# Patient Record
Sex: FEMALE | Race: WHITE | NOT HISPANIC OR LATINO | Employment: OTHER | URBAN - METROPOLITAN AREA
[De-identification: names, ages, dates, MRNs, and addresses within clinical notes are randomized per-mention and may not be internally consistent; named-entity substitution may affect disease eponyms.]

---

## 2019-10-21 ENCOUNTER — HOSPITAL ENCOUNTER (EMERGENCY)
Facility: CLINIC | Age: 74
Discharge: HOME | End: 2019-10-21
Attending: EMERGENCY MEDICINE | Admitting: EMERGENCY MEDICINE
Payer: MEDICARE

## 2019-10-21 ENCOUNTER — APPOINTMENT (OUTPATIENT)
Dept: ULTRASOUND IMAGING | Facility: CLINIC | Age: 74
End: 2019-10-21
Attending: EMERGENCY MEDICINE
Payer: MEDICARE

## 2019-10-21 VITALS
DIASTOLIC BLOOD PRESSURE: 69 MMHG | OXYGEN SATURATION: 99 % | WEIGHT: 206 LBS | HEART RATE: 64 BPM | SYSTOLIC BLOOD PRESSURE: 135 MMHG | TEMPERATURE: 97.7 F | RESPIRATION RATE: 16 BRPM

## 2019-10-21 DIAGNOSIS — I10 HYPERTENSION, UNSPECIFIED TYPE: ICD-10-CM

## 2019-10-21 DIAGNOSIS — K59.00 CONSTIPATION, UNSPECIFIED CONSTIPATION TYPE: ICD-10-CM

## 2019-10-21 DIAGNOSIS — D25.9 UTERINE LEIOMYOMA, UNSPECIFIED LOCATION: ICD-10-CM

## 2019-10-21 LAB
ALBUMIN SERPL-MCNC: 3.9 G/DL (ref 3.4–5)
ALBUMIN UR-MCNC: NEGATIVE MG/DL
ALP SERPL-CCNC: 81 U/L (ref 40–150)
ALT SERPL W P-5'-P-CCNC: 17 U/L (ref 0–50)
ANION GAP SERPL CALCULATED.3IONS-SCNC: 7 MMOL/L (ref 3–14)
APPEARANCE UR: CLEAR
AST SERPL W P-5'-P-CCNC: 18 U/L (ref 0–45)
BASOPHILS # BLD AUTO: 0 10E9/L (ref 0–0.2)
BASOPHILS NFR BLD AUTO: 0.2 %
BILIRUB SERPL-MCNC: 0.5 MG/DL (ref 0.2–1.3)
BILIRUB UR QL STRIP: NEGATIVE
BUN SERPL-MCNC: 14 MG/DL (ref 7–30)
CALCIUM SERPL-MCNC: 8.8 MG/DL (ref 8.5–10.1)
CHLORIDE SERPL-SCNC: 108 MMOL/L (ref 94–109)
CO2 SERPL-SCNC: 25 MMOL/L (ref 20–32)
COLOR UR AUTO: ABNORMAL
CREAT SERPL-MCNC: 0.75 MG/DL (ref 0.52–1.04)
DIFFERENTIAL METHOD BLD: NORMAL
EOSINOPHIL # BLD AUTO: 0.1 10E9/L (ref 0–0.7)
EOSINOPHIL NFR BLD AUTO: 0.9 %
ERYTHROCYTE [DISTWIDTH] IN BLOOD BY AUTOMATED COUNT: 12.3 % (ref 10–15)
GFR SERPL CREATININE-BSD FRML MDRD: 80 ML/MIN/{1.73_M2}
GLUCOSE SERPL-MCNC: 152 MG/DL (ref 70–99)
GLUCOSE UR STRIP-MCNC: NEGATIVE MG/DL
HCT VFR BLD AUTO: 46.2 % (ref 35–47)
HGB BLD-MCNC: 15.4 G/DL (ref 11.7–15.7)
HGB UR QL STRIP: ABNORMAL
IMM GRANULOCYTES # BLD: 0.1 10E9/L (ref 0–0.4)
IMM GRANULOCYTES NFR BLD: 0.5 %
KETONES UR STRIP-MCNC: NEGATIVE MG/DL
LEUKOCYTE ESTERASE UR QL STRIP: NEGATIVE
LIPASE SERPL-CCNC: 203 U/L (ref 73–393)
LYMPHOCYTES # BLD AUTO: 2.2 10E9/L (ref 0.8–5.3)
LYMPHOCYTES NFR BLD AUTO: 22.4 %
MCH RBC QN AUTO: 31.4 PG (ref 26.5–33)
MCHC RBC AUTO-ENTMCNC: 33.3 G/DL (ref 31.5–36.5)
MCV RBC AUTO: 94 FL (ref 78–100)
MONOCYTES # BLD AUTO: 0.6 10E9/L (ref 0–1.3)
MONOCYTES NFR BLD AUTO: 5.9 %
MUCOUS THREADS #/AREA URNS LPF: PRESENT /LPF
NEUTROPHILS # BLD AUTO: 6.9 10E9/L (ref 1.6–8.3)
NEUTROPHILS NFR BLD AUTO: 70.1 %
NITRATE UR QL: NEGATIVE
NRBC # BLD AUTO: 0 10*3/UL
NRBC BLD AUTO-RTO: 0 /100
PH UR STRIP: 8 PH (ref 5–7)
PLATELET # BLD AUTO: 165 10E9/L (ref 150–450)
POTASSIUM SERPL-SCNC: 3.2 MMOL/L (ref 3.4–5.3)
PROT SERPL-MCNC: 7.3 G/DL (ref 6.8–8.8)
RBC # BLD AUTO: 4.9 10E12/L (ref 3.8–5.2)
RBC #/AREA URNS AUTO: 13 /HPF (ref 0–2)
SODIUM SERPL-SCNC: 140 MMOL/L (ref 133–144)
SOURCE: ABNORMAL
SP GR UR STRIP: 1.02 (ref 1–1.03)
SQUAMOUS #/AREA URNS AUTO: 1 /HPF (ref 0–1)
UROBILINOGEN UR STRIP-MCNC: NORMAL MG/DL (ref 0–2)
WBC # BLD AUTO: 9.9 10E9/L (ref 4–11)
WBC #/AREA URNS AUTO: 1 /HPF (ref 0–5)

## 2019-10-21 PROCEDURE — 99284 EMERGENCY DEPT VISIT MOD MDM: CPT | Mod: 25

## 2019-10-21 PROCEDURE — 83690 ASSAY OF LIPASE: CPT | Performed by: EMERGENCY MEDICINE

## 2019-10-21 PROCEDURE — 76830 TRANSVAGINAL US NON-OB: CPT

## 2019-10-21 PROCEDURE — 80053 COMPREHEN METABOLIC PANEL: CPT | Performed by: EMERGENCY MEDICINE

## 2019-10-21 PROCEDURE — 81001 URINALYSIS AUTO W/SCOPE: CPT | Performed by: EMERGENCY MEDICINE

## 2019-10-21 PROCEDURE — 85025 COMPLETE CBC W/AUTO DIFF WBC: CPT | Performed by: EMERGENCY MEDICINE

## 2019-10-21 RX ORDER — HYDROCODONE BITARTRATE AND ACETAMINOPHEN 5; 325 MG/1; MG/1
1 TABLET ORAL EVERY 6 HOURS PRN
Qty: 10 TABLET | Refills: 0 | Status: SHIPPED | OUTPATIENT
Start: 2019-10-21 | End: 2019-10-24

## 2019-10-21 ASSESSMENT — ENCOUNTER SYMPTOMS
ABDOMINAL PAIN: 1
FEVER: 0
DYSURIA: 1

## 2019-10-21 NOTE — ED AVS SNAPSHOT
[facility] Emergency Department  [address]  [facility] [address]  Phone:  [phone]  Fax:  [phone]                                    Suma Iverson   MRN: [id number]    Department:  [facility] Emergency Department   Date of Visit:  10/21/2019           After Visit Summary Signature Page    I have received my discharge instructions, and my questions have been answered. I have discussed any challenges I see with this plan with the nurse or doctor.    ..........................................................................................................................................  Patient/Patient Representative Signature      ..........................................................................................................................................  Patient Representative Print Name and Relationship to Patient    ..................................................               ................................................  Date                                   Time    ..........................................................................................................................................  Reviewed by Signature/Title    ...................................................              ..............................................  Date                                               Time          22EPIC Rev 08/18

## 2019-10-22 NOTE — ED PROVIDER NOTES
History     Chief Complaint:  Abdominal Pain    HPI  Suma Iverson is a 74 year old female with a history of fibroid removal who presents to the emergency department today for evaluation of achy lower abdominal/pelvic pain. She has been experiencing this for a week now and reports the pain is intermittent lasting for an hour at a time. The patient was already evaluated by her primary care doctor and urgent care in the last 2 days with a reportedly negative abdominal CT, negative UA, and negative CBC. Her pain was better for a while this afternoon but then returned this evening and she is especially concerned with her upcoming trip to [place]. She also has been having urinary incontinence issues for several weeks and dysuria but has not seen a urologist or gynecologist yet. She denies any vaginal bleeding or fever. The patient has no abdominal surgical history other then her fibroid removal.     Allergies:  No known drug allergies    Medications:    The patient is not currently taking any prescribed medications.    Past Medical History:    BRANDON  HTN  HLD  Seizure disorder  Type 2 diabetes    Past Surgical History:    History reviewed. No pertinent surgical history.    Family History:    History reviewed. No pertinent family history.     Social History:  The patient arrives alone  Spends half the year in [place]  Marital Status:  [2]      Review of Systems   Constitutional: Negative for fever.   Gastrointestinal: Positive for abdominal pain.   Genitourinary: Positive for dysuria, enuresis and pelvic pain. Negative for vaginal bleeding and vaginal discharge.   All other systems reviewed and are negative.    Physical Exam     Patient Vitals for the past 24 hrs:   BP Temp Temp src Pulse Heart Rate Resp SpO2 Weight   10/21/19 2339 -- -- -- -- -- 16 -- --   10/21/19 2334 -- -- -- -- -- -- 99 % --   10/21/19 2332 135/69 -- -- 64 -- -- -- --   10/21/19 2200 (!) 197/133 -- -- 86 -- -- 100 % --   10/21/19 2154 (!)  201/111 97.7  F (36.5  C) Oral -- 75 18 100 % 93.4 kg (206 lb)     Physical Exam  General: Patient is alert and interactive when I enter the room  Head:  The scalp, face, and head appear normal  Eyes:  Conjunctivae are normal  ENT:    The nose is normal    Pinnae are normal    External acoustic canals are normal  Neck:  Trachea midline  CV:  Pulses are normal   Resp:  No respiratory distress   Abdomen:      Soft, non-tender, non-distended  Musc:  Normal muscular tone    No major joint effusions    No asymmetric leg swelling  Skin:  No rash or lesions noted  Neuro:  Speech is normal and fluent. Face is symmetric.     Moving all extremities well.   Psych: Awake. Alert.  Normal affect.  Appropriate interactions.    Emergency Department Course     Imaging:  Radiology findings were communicated with the patient who voiced understanding of the findings.  US Pelvis compete w transvaginal   IMPRESSION:  1. Multiple uterine fibroids.  2. The endometrium is normal in thickness at 0.2 cm. The endometrium is obscured near the uterine fundus and submucosal fibroid is a possibility  Reading per radiology    Laboratory:  Laboratory findings were communicated with the patient who voiced understanding of the findings.    CBC:  o/w WNL. (WBC 9.9, HGB 15.4, )   CMP: Glucose 152 (H), potassium 3.2 (L), o/w WNL (Creatinine: 0.75)    Lipase: 203    UA: urine blood trace, pH 8.0 (H), RBC/HPF 13 (H), mucus present, o/w Negative    Emergency Department Course:  Past medical records, nursing notes, and vitals reviewed.  2152: I performed an exam of the patient and obtained history, as documented above.     IV was inserted and blood was drawn for laboratory testing, results above.  The patient provided a urine sample here in the emergency department. This was sent for laboratory testing, findings above.  The patient was sent for a US abdomen w transvag while in the emergency department, findings above.    I personally reviewed the  laboratory/imaging results with the Patient and answered all related questions prior to discharge.    2328: I rechecked the patient.  Findings and plan explained to the Patient. Patient discharged home with instructions regarding supportive care, medications, and reasons to return. The importance of close follow-up was reviewed.     Impression & Plan      Medical Decision Making:  Suma Iverson is a 74 year old female who presents to the emergency department today for evaluation of pelvic pain.  Patient describes intermittent pain for the past week.  She is already had a negative work-up including a CT and urine.  Her abdomen is very benign.  We did do an ultrasound which does reveal multiple uterine fibroids.  It is possible this is causing her pain.  Her urine shows no signs of infection.  I suspect some of this could be causing her pain.  She also had noticeable constipation on CT.  I think at this point she should follow-up with a gynecologist to discuss her fibroids and encourage continued improvement of her constipation with laxatives.  She is going to start MiraLAX.  I will give her a short course of pain medication to help with her pain as she is going to [place] soon.  She will follow-up with these doctors in [place].  Patient also was quite hypertensive and we discussed following up with her doctor about this.  Recently on discharge her blood pressure normalized.      Diagnosis:    ICD-10-CM   1. Uterine leiomyoma, unspecified location D25.9   2. Constipation, unspecified constipation type K59.00   3. Hypertension, unspecified type I10       Disposition:   discharged to home    Discharge Medications:     Medication List      Started    HYDROcodone-acetaminophen 5-325 MG tablet  Commonly known as:  NORCO  1 tablet, Oral, EVERY 6 HOURS PRN            Scribe Disclosure:  Kandy ACEVEDO, am serving as a scribe at 9:52 PM on 10/21/2019 to document services personally performed by Yaneth Rolle MD based on  my observations and the provider's statements to me.    [facility] EMERGENCY DEPARTMENT       Yaneth Rolle MD  10/22/19 0209

## 2019-10-22 NOTE — ED TRIAGE NOTES
"Pt reports lower pelvic pain that feels like \"there's something there\" x1 week. Had CT done at [facility] which noted fibroids. Pt denies urinary symptoms.  ABCs intact A&Ox4.  "

## 2021-09-06 ENCOUNTER — HOSPITAL ENCOUNTER (EMERGENCY)
Facility: CLINIC | Age: 76
Discharge: HOME | End: 2021-09-06
Attending: NURSE PRACTITIONER | Admitting: NURSE PRACTITIONER
Payer: MEDICARE

## 2021-09-06 VITALS
RESPIRATION RATE: 18 BRPM | TEMPERATURE: 97.4 F | SYSTOLIC BLOOD PRESSURE: 152 MMHG | HEART RATE: 89 BPM | DIASTOLIC BLOOD PRESSURE: 88 MMHG | OXYGEN SATURATION: 98 %

## 2021-09-06 DIAGNOSIS — M79.646 THUMB PAIN: ICD-10-CM

## 2021-09-06 DIAGNOSIS — T63.441A BEE STING REACTION: ICD-10-CM

## 2021-09-06 PROBLEM — F41.1 GAD (GENERALIZED ANXIETY DISORDER): Status: ACTIVE | Noted: 2021-07-07

## 2021-09-06 PROCEDURE — 99282 EMERGENCY DEPT VISIT SF MDM: CPT

## 2021-09-06 ASSESSMENT — ENCOUNTER SYMPTOMS
WEAKNESS: 0
WOUND: 0
NUMBNESS: 0

## 2021-09-06 NOTE — DISCHARGE INSTRUCTIONS
Take Benadryl also known as diphenhydramine.  25 to 50 mg 3 times a day for the next 2 to 3 days.  Watch for signs of infection as we discussed.

## 2021-09-06 NOTE — ED TRIAGE NOTES
Patient presents to the ED with a swollen left thumb. Was stung by a bee on Saturday. States swelling is not getting any better. Denies difficulty breathing or swelling of mouth and throat.

## 2021-09-06 NOTE — ED PROVIDER NOTES
History   Chief Complaint:  Insect Bite       HPI   Suma Iverson is a 75 year old female with history of anxiety, type 2 diabetes and hypertension who presents with increased swelling to her left thumb after a bee sting 2 days ago. She notes that she used Hydrocortisone cream that only made her hand and wrist itch. She states that water hurts her thumb and ice and baking soda provided no relief. She is not on any blood thinners.     Review of Systems   Musculoskeletal:        Mild thumb edema   Skin: Negative for wound.        +bee sting   Neurological: Negative for weakness and numbness.   All other systems reviewed and are negative.    Allergies:  The patient has no known allergies.     Medications:  Oretic  Prilosec  Zoloft  Zocor    Past Medical History:    PUD  Anxiety  Type 2 diabetes  Hypertension  Hyperlipidemia  Seizure disorder  Uterine fibroid    Past Surgical History:    Hysteroscopy  Cataract removal with implant     Family History:    Father: CAD, type 2 diabetes    Social History:  Patient presents to the ED alone.    Physical Exam     Patient Vitals for the past 24 hrs:   BP Temp Pulse Resp SpO2   09/06/21 1419 (!) 152/88 97.4  F (36.3  C) 89 18 98 %       Physical Exam  General: Alert, No obvious discomfort, well kept   HENT:  Normal voice, No lymphadenopathy  Eyes:  The pupils are equal, round, and reactive to light, Conjunctiva normal, No scleral icterus   Neck:  Normal range of motion  CV:  Normal Pulses  Resp:  Non-labored, No cough  MS:  Normal muscular tone, moves all extremities, left thumb with mild edema.  No erythema.  No proximal streaking.  No wound.  Skin:  No rash or acute skin lesions noted  Neuro:  Speech is normal and fluent  Psych: Awake. Alert.  Normal affect.  Appropriate interactions. Good eye contact    Emergency Department Course     Emergency Department Course:    Reviewed:  I reviewed nursing notes, vitals, past medical history and care  everywhere    Assessments:  1625 I obtained history and examined the patient as noted above.     Disposition:  The patient was discharged to home.     Impression & Plan     Medical Decision Making:  Suma Iverson is a 75 year old female who presents today for evaluation of left thumb pain after an insect sting.  She feels that she may have been stung by one or more of these 2 days ago.  She continued to have some some swelling and discomfort therefore presented for evaluation.  Her examination showed some mild swelling consistent with mild histamine reaction.  There is no erythema or proximal streaking.  No signs of infection.  She had not tried a antihistamine for this.  We discussed use of Benadryl.  There is no indication for antibiotics.  She is advised on signs and symptoms of infection.  Will follow up with primary care provider in 3 to 5 days if further concerns sooner if worsening symptoms.  At this point time she appears to be safe and appropriate for outpatient management follow-up and is discharged home.        Diagnosis:    ICD-10-CM    1. Bee sting reaction  T63.441A    2. Thumb pain  M79.646        Scribe Disclosure:  I, Heriberto Baldwin, am serving as a scribe at 4:29 PM on 9/6/2021 to document services personally performed by Markus Zambrano APRN based on my observations and the provider's statements to me.            Markus Zambrano APRN CNP  09/06/21 4406

## 2022-10-19 ENCOUNTER — HOSPITAL ENCOUNTER (EMERGENCY)
Facility: CLINIC | Age: 77
Discharge: HOME | End: 2022-10-19
Attending: EMERGENCY MEDICINE | Admitting: EMERGENCY MEDICINE
Payer: MEDICARE

## 2022-10-19 ENCOUNTER — APPOINTMENT (OUTPATIENT)
Dept: GENERAL RADIOLOGY | Facility: CLINIC | Age: 77
End: 2022-10-19
Attending: EMERGENCY MEDICINE
Payer: MEDICARE

## 2022-10-19 VITALS
RESPIRATION RATE: 18 BRPM | OXYGEN SATURATION: 97 % | DIASTOLIC BLOOD PRESSURE: 87 MMHG | TEMPERATURE: 97 F | SYSTOLIC BLOOD PRESSURE: 159 MMHG | HEART RATE: 97 BPM

## 2022-10-19 DIAGNOSIS — Z87.19 HISTORY OF GASTROINTESTINAL ULCER: ICD-10-CM

## 2022-10-19 DIAGNOSIS — L74.9 SWEATING ABNORMALITY: ICD-10-CM

## 2022-10-19 LAB
ANION GAP SERPL CALCULATED.3IONS-SCNC: 14 MMOL/L (ref 7–15)
BASOPHILS # BLD AUTO: 0 10E3/UL (ref 0–0.2)
BASOPHILS NFR BLD AUTO: 0 %
BUN SERPL-MCNC: 28.5 MG/DL (ref 8–23)
CALCIUM SERPL-MCNC: 9.9 MG/DL (ref 8.8–10.2)
CHLORIDE SERPL-SCNC: 99 MMOL/L (ref 98–107)
CREAT SERPL-MCNC: 0.91 MG/DL (ref 0.51–0.95)
D DIMER PPP FEU-MCNC: 0.58 UG/ML FEU (ref 0–0.5)
DEPRECATED HCO3 PLAS-SCNC: 26 MMOL/L (ref 22–29)
EOSINOPHIL # BLD AUTO: 0 10E3/UL (ref 0–0.7)
EOSINOPHIL NFR BLD AUTO: 0 %
ERYTHROCYTE [DISTWIDTH] IN BLOOD BY AUTOMATED COUNT: 11.9 % (ref 10–15)
FLUAV RNA SPEC QL NAA+PROBE: NEGATIVE
FLUBV RNA RESP QL NAA+PROBE: NEGATIVE
GFR SERPL CREATININE-BSD FRML MDRD: 65 ML/MIN/1.73M2
GLUCOSE SERPL-MCNC: 147 MG/DL (ref 70–99)
HCT VFR BLD AUTO: 49.2 % (ref 35–47)
HGB BLD-MCNC: 16.2 G/DL (ref 11.7–15.7)
HOLD SPECIMEN: NORMAL
IMM GRANULOCYTES # BLD: 0 10E3/UL
IMM GRANULOCYTES NFR BLD: 0 %
LYMPHOCYTES # BLD AUTO: 1.9 10E3/UL (ref 0.8–5.3)
LYMPHOCYTES NFR BLD AUTO: 18 %
MCH RBC QN AUTO: 31.8 PG (ref 26.5–33)
MCHC RBC AUTO-ENTMCNC: 32.9 G/DL (ref 31.5–36.5)
MCV RBC AUTO: 97 FL (ref 78–100)
MONOCYTES # BLD AUTO: 0.5 10E3/UL (ref 0–1.3)
MONOCYTES NFR BLD AUTO: 4 %
NEUTROPHILS # BLD AUTO: 8.3 10E3/UL (ref 1.6–8.3)
NEUTROPHILS NFR BLD AUTO: 78 %
NRBC # BLD AUTO: 0 10E3/UL
NRBC BLD AUTO-RTO: 0 /100
PLATELET # BLD AUTO: 201 10E3/UL (ref 150–450)
POTASSIUM SERPL-SCNC: 3.5 MMOL/L (ref 3.4–5.3)
RBC # BLD AUTO: 5.09 10E6/UL (ref 3.8–5.2)
RSV RNA SPEC NAA+PROBE: NEGATIVE
SARS-COV-2 RNA RESP QL NAA+PROBE: NEGATIVE
SODIUM SERPL-SCNC: 139 MMOL/L (ref 136–145)
TROPONIN T SERPL HS-MCNC: 12 NG/L
TROPONIN T SERPL HS-MCNC: 13 NG/L
TSH SERPL DL<=0.005 MIU/L-ACNC: 1.34 UIU/ML (ref 0.3–4.2)
WBC # BLD AUTO: 10.8 10E3/UL (ref 4–11)

## 2022-10-19 PROCEDURE — 84484 ASSAY OF TROPONIN QUANT: CPT | Mod: 91 | Performed by: EMERGENCY MEDICINE

## 2022-10-19 PROCEDURE — C9803 HOPD COVID-19 SPEC COLLECT: HCPCS

## 2022-10-19 PROCEDURE — 71046 X-RAY EXAM CHEST 2 VIEWS: CPT

## 2022-10-19 PROCEDURE — 85025 COMPLETE CBC W/AUTO DIFF WBC: CPT | Performed by: EMERGENCY MEDICINE

## 2022-10-19 PROCEDURE — 87637 SARSCOV2&INF A&B&RSV AMP PRB: CPT | Performed by: EMERGENCY MEDICINE

## 2022-10-19 PROCEDURE — 36415 COLL VENOUS BLD VENIPUNCTURE: CPT | Performed by: EMERGENCY MEDICINE

## 2022-10-19 PROCEDURE — 93005 ELECTROCARDIOGRAM TRACING: CPT

## 2022-10-19 PROCEDURE — 80048 BASIC METABOLIC PNL TOTAL CA: CPT | Performed by: EMERGENCY MEDICINE

## 2022-10-19 PROCEDURE — 84484 ASSAY OF TROPONIN QUANT: CPT | Performed by: EMERGENCY MEDICINE

## 2022-10-19 PROCEDURE — 84443 ASSAY THYROID STIM HORMONE: CPT | Performed by: EMERGENCY MEDICINE

## 2022-10-19 PROCEDURE — 85379 FIBRIN DEGRADATION QUANT: CPT | Performed by: EMERGENCY MEDICINE

## 2022-10-19 PROCEDURE — 99285 EMERGENCY DEPT VISIT HI MDM: CPT | Mod: 25

## 2022-10-19 RX ORDER — SUCRALFATE 1 G/1
1 TABLET ORAL 4 TIMES DAILY
Qty: 30 TABLET | Refills: 0 | Status: SHIPPED | OUTPATIENT
Start: 2022-10-19 | End: 2022-10-27

## 2022-10-19 RX ORDER — FAMOTIDINE 20 MG/1
20 TABLET, FILM COATED ORAL 2 TIMES DAILY
Qty: 60 TABLET | Refills: 0 | Status: SHIPPED | OUTPATIENT
Start: 2022-10-19 | End: 2022-11-18

## 2022-10-19 ASSESSMENT — ACTIVITIES OF DAILY LIVING (ADL)
ADLS_ACUITY_SCORE: 33
ADLS_ACUITY_SCORE: 35

## 2022-10-19 ASSESSMENT — ENCOUNTER SYMPTOMS
DIAPHORESIS: 1
LIGHT-HEADEDNESS: 0
NAUSEA: 0
DIZZINESS: 0

## 2022-10-20 LAB
ATRIAL RATE - MUSE: 97 BPM
DIASTOLIC BLOOD PRESSURE - MUSE: NORMAL MMHG
INTERPRETATION ECG - MUSE: NORMAL
P AXIS - MUSE: 19 DEGREES
PR INTERVAL - MUSE: 190 MS
QRS DURATION - MUSE: 94 MS
QT - MUSE: 354 MS
QTC - MUSE: 449 MS
R AXIS - MUSE: -43 DEGREES
SYSTOLIC BLOOD PRESSURE - MUSE: NORMAL MMHG
T AXIS - MUSE: 85 DEGREES
VENTRICULAR RATE- MUSE: 97 BPM

## 2022-10-20 NOTE — ED PROVIDER NOTES
Rapid Assessment Note    History:   Suma Iverson is a 77 year old female who presents with three episodes of diaphoresis today; these episodes lasted for minutes and are associated with some mild, quick, stabbing chest discomfort. She had oral surgery recently and has not been eating this week. She has a chronic cough which she attributes to her husbands smoking. She denies fever, dyspnea, nausea, vomiting, leg pain or swelling. She denies history of MI or PE. She is not on blood thinners.        Exam:   General:  Alert, interactive  Cardiovascular:  Well perfused  Lungs:  No respiratory distress, no accessory muscle use  Neuro:  Moving all 4 extremities  Skin:  Warm, dry  Psych:  Normal affect    Plan of Care:   I evaluated the patient and developed an initial plan of care. I discussed this plan and explained that I, or one of my partners, would be returning to complete the evaluation.     I, Ross Kjer, am serving as a scribe to document services personally performed by Bentley Rodrigues MD, based on my observations and the provider's statements to me.    10/19/2022  [employer]    Portions of this medical record were completed by a scribe. UPON MY REVIEW AND AUTHENTICATION BY ELECTRONIC SIGNATURE, this confirms (a) I performed the applicable clinical services, and (b) the record is accurate.        Bentley Rodrigues MD  10/19/22 2048

## 2022-10-20 NOTE — ED PROVIDER NOTES
History   Chief Complaint:  Sweaty       The history is provided by the patient.      Suma Iverson is a 77 year old female with history of hypertension, diabetes, hyperlipidemia and reflux who presents with sweatiness. The patient states that she has experienced 3 bouts of diaphoresis today; Once while letting her cat out, Once while cooking, and once just while standing. She states that she felt warm and clammy. She notes that she has reflux and had ulcers, which were diagnosed 2 years ago. The patient denies nausea, lightheadedness, dizziness, smoking, and alcohol.     Review of Systems   Constitutional: Positive for diaphoresis.   Gastrointestinal: Negative for nausea.   Neurological: Negative for dizziness and light-headedness.   All other systems reviewed and are negative.    Allergies:  The patient has no known allergies.     Medications:  Hydrochlorothiazide  Zocor  Omeprazole    Past Medical History:      Anxiety  Hypertension  Hyperlipidemia  Seizure disorder  Type II Diabetes  Obesity  Inverted nipple  Uterine Fibroid    Past Surgical History:    Uterine fibroid surgery  Cataract replacement    Family History:    Father: CAD, Type II Diabetes    Social History:  The patient presents to the ED alone.  PCP: Libby Moses     Physical Exam     Patient Vitals for the past 24 hrs:   BP Temp Temp src Pulse Resp SpO2   10/19/22 2214 -- -- -- -- -- 98 %   10/19/22 2213 -- -- -- -- -- 97 %   10/19/22 2212 (!) 172/101 -- -- 99 -- --   10/19/22 2008 (!) 185/129 -- -- -- -- --   10/19/22 2004 -- 97  F (36.1  C) Temporal 106 18 98 %       Physical Exam  Constitutional: Alert, attentive, GCS 15  Eyes: EOM are normal, anicteric, conjugate gaze  CV: regular rate and rhythm; no murmurs  Chest: Effort normal and breath sounds clear without wheezing or rales, symmetric bilaterally   GI:  non tender. No distension. No guarding or rebound.    MSK: No LE edema, no tenderness to palpation of BLE.  Neurological: Alert,  attentive, moving all extremities equally.   Skin: Skin is warm and dry.      Emergency Department Course   ECG  ECG results from 10/19/22   EKG 12 lead     Value    Systolic Blood Pressure     Diastolic Blood Pressure     Ventricular Rate 97    Atrial Rate 97    LA Interval 190    QRS Duration 94        QTc 449    P Axis 19    R AXIS -43    T Axis 85    Interpretation ECG      Sinus rhythm  Left axis deviation  Septal infarct , age undetermined  T wave abnormality, consider lateral ischemia  Abnormal ECG  When compared with ECG of 28-SEP-2011 07:24,  Septal infarct is now Present  T wave inversion now evident in Lateral leads         Imaging:  Chest XR,  PA & LAT   Final Result   IMPRESSION: Torsion aorta. Chest otherwise negative.        Report per radiology    Laboratory:  Labs Ordered and Resulted from Time of ED Arrival to Time of ED Departure   BASIC METABOLIC PANEL - Abnormal       Result Value    Sodium 139      Potassium 3.5      Chloride 99      Carbon Dioxide (CO2) 26      Anion Gap 14      Urea Nitrogen 28.5 (*)     Creatinine 0.91      Calcium 9.9      Glucose 147 (*)     GFR Estimate 65     CBC WITH PLATELETS AND DIFFERENTIAL - Abnormal    WBC Count 10.8      RBC Count 5.09      Hemoglobin 16.2 (*)     Hematocrit 49.2 (*)     MCV 97      MCH 31.8      MCHC 32.9      RDW 11.9      Platelet Count 201      % Neutrophils 78      % Lymphocytes 18      % Monocytes 4      % Eosinophils 0      % Basophils 0      % Immature Granulocytes 0      NRBCs per 100 WBC 0      Absolute Neutrophils 8.3      Absolute Lymphocytes 1.9      Absolute Monocytes 0.5      Absolute Eosinophils 0.0      Absolute Basophils 0.0      Absolute Immature Granulocytes 0.0      Absolute NRBCs 0.0     D DIMER QUANTITATIVE - Abnormal    D-Dimer Quantitative 0.58 (*)    TROPONIN T, HIGH SENSITIVITY - Normal    Troponin T, High Sensitivity 13     INFLUENZA A/B & SARS-COV2 PCR MULTIPLEX - Normal    Influenza A PCR Negative      Influenza  B PCR Negative      RSV PCR Negative      SARS CoV2 PCR Negative     TSH WITH FREE T4 REFLEX - Normal    TSH 1.34     TROPONIN T, HIGH SENSITIVITY - Normal    Troponin T, High Sensitivity 12        Emergency Department Course:     Reviewed:  I reviewed nursing notes, vitals, past medical history and Care Everywhere    Assessments:   I obtained history and examined the patient as noted above.    I rechecked the patient, explained findings, and prepared for discharge.     Disposition:  The patient was discharged to home.     Impression & Plan   Medical Decision Makin yo F with hx of anxiety, HTN, HLD, DM, presenting for 3 very brief episodes of sweating without associated sx.  EKG and high sen. Trop X2 negative, low concern for ACS given absence of exertional component or discomfort of any kind. CXR clear. No urinary sx with low concern for infection. She mentioned hx of GERD and has had similar sweating episodes with reflux in the past. Will increase add pepcid and carafate to omeprazole. Abdomen benign, return precautions reviewed.     Diagnosis:    ICD-10-CM    1. History of gastrointestinal ulcer  Z87.19       2. Sweating abnormality  L74.9           Discharge Medications:  New Prescriptions    FAMOTIDINE (PEPCID) 20 MG TABLET    Take 1 tablet (20 mg) by mouth 2 times daily for 30 days    SUCRALFATE (CARAFATE) 1 GM TABLET    Take 1 tablet (1 g) by mouth 4 times daily for 30 doses     Vincent Osorio MD  [employer]  9:14 AM 22     Scribe Disclosure:  I, Isidro Taylor, am serving as a scribe at 10:25 PM on 10/19/2022 to document services personally performed by Vincent Osorio MD based on my observations and the provider's statements to me.          Vincent Osorio MD  22 0914

## 2022-10-20 NOTE — DISCHARGE INSTRUCTIONS
I suspect your sweating is related to your stomach ulcers.  You should continue to take omeprazole, you can add in the Pepcid and the Carafate.  Should you develop chest pain, chest pressure especially if it radiates to her neck jaw or is associated with exertion you should return here to the emergency department.  Otherwise you should make appointment follow-up back in [place].  You should continue to check your blood pressure at home recommend checking once in the morning and once in the evening and writing these numbers down and taking them back to your doctor in [place].

## 2022-10-20 NOTE — ED TRIAGE NOTES
Pt got sweaty three times today, went to [facility] and was sent to ED for blood work. EKG pt brought with states first degree AVB. Of note, pt had oral surgery this week. Denies fever.

## 2023-05-21 ENCOUNTER — APPOINTMENT (OUTPATIENT)
Dept: CT IMAGING | Facility: CLINIC | Age: 78
End: 2023-05-21
Attending: EMERGENCY MEDICINE
Payer: MEDICARE

## 2023-05-21 ENCOUNTER — HOSPITAL ENCOUNTER (EMERGENCY)
Facility: CLINIC | Age: 78
Discharge: HOME | End: 2023-05-21
Attending: EMERGENCY MEDICINE | Admitting: EMERGENCY MEDICINE
Payer: MEDICARE

## 2023-05-21 VITALS
SYSTOLIC BLOOD PRESSURE: 140 MMHG | HEART RATE: 84 BPM | DIASTOLIC BLOOD PRESSURE: 82 MMHG | TEMPERATURE: 97.5 F | RESPIRATION RATE: 18 BRPM | WEIGHT: 185.19 LBS | HEIGHT: 64 IN | BODY MASS INDEX: 31.62 KG/M2 | OXYGEN SATURATION: 99 %

## 2023-05-21 DIAGNOSIS — K21.9 GASTROESOPHAGEAL REFLUX DISEASE WITHOUT ESOPHAGITIS: ICD-10-CM

## 2023-05-21 LAB
ALBUMIN SERPL BCG-MCNC: 4.8 G/DL (ref 3.5–5.2)
ALP SERPL-CCNC: 107 U/L (ref 35–104)
ALT SERPL W P-5'-P-CCNC: 10 U/L (ref 10–35)
ANION GAP SERPL CALCULATED.3IONS-SCNC: 13 MMOL/L (ref 7–15)
AST SERPL W P-5'-P-CCNC: 23 U/L (ref 10–35)
BASOPHILS # BLD AUTO: 0 10E3/UL (ref 0–0.2)
BASOPHILS NFR BLD AUTO: 0 %
BILIRUB SERPL-MCNC: 0.7 MG/DL
BUN SERPL-MCNC: 17.2 MG/DL (ref 8–23)
CALCIUM SERPL-MCNC: 9.8 MG/DL (ref 8.8–10.2)
CHLORIDE SERPL-SCNC: 102 MMOL/L (ref 98–107)
CREAT SERPL-MCNC: 0.94 MG/DL (ref 0.51–0.95)
DEPRECATED HCO3 PLAS-SCNC: 24 MMOL/L (ref 22–29)
EOSINOPHIL # BLD AUTO: 0.1 10E3/UL (ref 0–0.7)
EOSINOPHIL NFR BLD AUTO: 1 %
ERYTHROCYTE [DISTWIDTH] IN BLOOD BY AUTOMATED COUNT: 12.4 % (ref 10–15)
GFR SERPL CREATININE-BSD FRML MDRD: 63 ML/MIN/1.73M2
GLUCOSE SERPL-MCNC: 161 MG/DL (ref 70–99)
HCT VFR BLD AUTO: 46.3 % (ref 35–47)
HGB BLD-MCNC: 15.6 G/DL (ref 11.7–15.7)
HOLD SPECIMEN: NORMAL
HOLD SPECIMEN: NORMAL
IMM GRANULOCYTES # BLD: 0 10E3/UL
IMM GRANULOCYTES NFR BLD: 0 %
LIPASE SERPL-CCNC: 49 U/L (ref 13–60)
LYMPHOCYTES # BLD AUTO: 2.3 10E3/UL (ref 0.8–5.3)
LYMPHOCYTES NFR BLD AUTO: 21 %
MCH RBC QN AUTO: 31.8 PG (ref 26.5–33)
MCHC RBC AUTO-ENTMCNC: 33.7 G/DL (ref 31.5–36.5)
MCV RBC AUTO: 95 FL (ref 78–100)
MONOCYTES # BLD AUTO: 0.7 10E3/UL (ref 0–1.3)
MONOCYTES NFR BLD AUTO: 6 %
NEUTROPHILS # BLD AUTO: 7.6 10E3/UL (ref 1.6–8.3)
NEUTROPHILS NFR BLD AUTO: 72 %
NRBC # BLD AUTO: 0 10E3/UL
NRBC BLD AUTO-RTO: 0 /100
PLATELET # BLD AUTO: 185 10E3/UL (ref 150–450)
POTASSIUM SERPL-SCNC: 3.9 MMOL/L (ref 3.4–5.3)
PROT SERPL-MCNC: 7.7 G/DL (ref 6.4–8.3)
RBC # BLD AUTO: 4.9 10E6/UL (ref 3.8–5.2)
SODIUM SERPL-SCNC: 139 MMOL/L (ref 136–145)
TROPONIN T SERPL HS-MCNC: 12 NG/L
WBC # BLD AUTO: 10.7 10E3/UL (ref 4–11)

## 2023-05-21 PROCEDURE — 83690 ASSAY OF LIPASE: CPT | Performed by: EMERGENCY MEDICINE

## 2023-05-21 PROCEDURE — 85025 COMPLETE CBC W/AUTO DIFF WBC: CPT | Performed by: EMERGENCY MEDICINE

## 2023-05-21 PROCEDURE — 93005 ELECTROCARDIOGRAM TRACING: CPT

## 2023-05-21 PROCEDURE — 36415 COLL VENOUS BLD VENIPUNCTURE: CPT | Performed by: EMERGENCY MEDICINE

## 2023-05-21 PROCEDURE — 96374 THER/PROPH/DIAG INJ IV PUSH: CPT | Mod: 59

## 2023-05-21 PROCEDURE — 99285 EMERGENCY DEPT VISIT HI MDM: CPT | Mod: 25

## 2023-05-21 PROCEDURE — G1010 CDSM STANSON: HCPCS

## 2023-05-21 PROCEDURE — 84484 ASSAY OF TROPONIN QUANT: CPT | Performed by: EMERGENCY MEDICINE

## 2023-05-21 PROCEDURE — 250N000009 HC RX 250: Performed by: EMERGENCY MEDICINE

## 2023-05-21 PROCEDURE — 250N000011 HC RX IP 250 OP 636: Performed by: EMERGENCY MEDICINE

## 2023-05-21 PROCEDURE — 80053 COMPREHEN METABOLIC PANEL: CPT | Performed by: EMERGENCY MEDICINE

## 2023-05-21 RX ORDER — SUCRALFATE 1 G/1
1 TABLET ORAL 2 TIMES DAILY
Qty: 30 TABLET | Refills: 0 | Status: ON HOLD | OUTPATIENT
Start: 2023-05-21 | End: 2023-11-04

## 2023-05-21 RX ORDER — IOPAMIDOL 755 MG/ML
500 INJECTION, SOLUTION INTRAVASCULAR ONCE
Status: COMPLETED | OUTPATIENT
Start: 2023-05-21 | End: 2023-05-21

## 2023-05-21 RX ADMIN — SODIUM CHLORIDE 63 ML: 9 INJECTION, SOLUTION INTRAVENOUS at 02:21

## 2023-05-21 RX ADMIN — IOPAMIDOL 93 ML: 755 INJECTION, SOLUTION INTRAVENOUS at 02:21

## 2023-05-21 RX ADMIN — FAMOTIDINE 20 MG: 10 INJECTION, SOLUTION INTRAVENOUS at 02:06

## 2023-05-21 ASSESSMENT — ACTIVITIES OF DAILY LIVING (ADL)
ADLS_ACUITY_SCORE: 35
ADLS_ACUITY_SCORE: 35

## 2023-05-21 NOTE — ED TRIAGE NOTES
Pt c/o epigastric pain that started on Thursday. Pt reports hx of ulcers and that her cat  a week ago and she has been emotional due to that. Pt reports a burning pain that radiates into her throat.

## 2023-05-21 NOTE — ED PROVIDER NOTES
"PIT/Triage Evaluation    Patient presented with complaints of \"indigestion\".  She reports that she has a longstanding history of ulcers and has been having worsening indigestion over the past several days, in particular, since her kidney .  She is quite tearful in relating this history.  Is been taking over-the-counter Mylanta and/or Maalox without improvement.  No associated blood in stools, or fever.    Exam is notable for:    Patient Vitals for the past 24 hrs:   BP Temp Temp src Pulse Resp SpO2 Height Weight   23 0035 (!) 150/97 97.5  F (36.4  C) Temporal 85 18 95 % 1.626 m (5' 4\") 84 kg (185 lb 3 oz)       Well-appearing and afebrile with a completely benign abdominal exam.  Plans are for screening laboratory tests, including CBC, CMP and lipase.  High-sensitivity troponin is already back and is within normal limits.  EKG is unremarkable.  No concern for cardiogenic etiology.    She is reporting a recent visit in [place] which included a CT scan of her abdomen which showed no acute findings.  Would defer imaging pending labs.  If unremarkable, consider treatment for gastroesophageal reflux.  Medications were not reviewed with her.    Appropriate interventions for symptom management were initiated if applicable.  Appropriate diagnostic tests were initiated if indicated.    Important information for subsequent clinician:    I briefly evaluated the patient and developed an initial plan of care. I discussed this plan and explained that this brief interaction does not constitute a full evaluation. Patient/family understands that they should wait to be fully evaluated and discuss any test results with another clinician prior to leaving the hospital.       Donavon Kimble MD  23 1223    "

## 2023-05-22 LAB
ATRIAL RATE - MUSE: 72 BPM
DIASTOLIC BLOOD PRESSURE - MUSE: NORMAL MMHG
INTERPRETATION ECG - MUSE: NORMAL
P AXIS - MUSE: 36 DEGREES
PR INTERVAL - MUSE: 176 MS
QRS DURATION - MUSE: 86 MS
QT - MUSE: 398 MS
QTC - MUSE: 435 MS
R AXIS - MUSE: -25 DEGREES
SYSTOLIC BLOOD PRESSURE - MUSE: NORMAL MMHG
T AXIS - MUSE: 40 DEGREES
VENTRICULAR RATE- MUSE: 72 BPM

## 2023-06-08 ENCOUNTER — HOSPITAL ENCOUNTER (EMERGENCY)
Facility: CLINIC | Age: 78
Discharge: HOME | End: 2023-06-09
Attending: EMERGENCY MEDICINE | Admitting: EMERGENCY MEDICINE
Payer: MEDICARE

## 2023-06-08 DIAGNOSIS — J02.9 SORE THROAT: ICD-10-CM

## 2023-06-08 PROCEDURE — 87651 STREP A DNA AMP PROBE: CPT | Performed by: EMERGENCY MEDICINE

## 2023-06-08 PROCEDURE — 99283 EMERGENCY DEPT VISIT LOW MDM: CPT

## 2023-06-08 PROCEDURE — 250N000013 HC RX MED GY IP 250 OP 250 PS 637: Performed by: EMERGENCY MEDICINE

## 2023-06-08 RX ORDER — ACETAMINOPHEN 500 MG
1000 TABLET ORAL ONCE
Status: COMPLETED | OUTPATIENT
Start: 2023-06-08 | End: 2023-06-08

## 2023-06-08 RX ADMIN — ACETAMINOPHEN 1000 MG: 500 TABLET, FILM COATED ORAL at 23:57

## 2023-06-09 VITALS
RESPIRATION RATE: 20 BRPM | SYSTOLIC BLOOD PRESSURE: 159 MMHG | BODY MASS INDEX: 33.6 KG/M2 | WEIGHT: 195.77 LBS | DIASTOLIC BLOOD PRESSURE: 78 MMHG | HEART RATE: 66 BPM | TEMPERATURE: 97.8 F | OXYGEN SATURATION: 100 %

## 2023-06-09 LAB — GROUP A STREP BY PCR: NOT DETECTED

## 2023-06-09 ASSESSMENT — ACTIVITIES OF DAILY LIVING (ADL): ADLS_ACUITY_SCORE: 33

## 2023-06-09 NOTE — ED TRIAGE NOTES
Pharyngitis for a few days. Thought it was related to GERD, but now feels different. Does not hurt to swallow. On ulcer medicine. Can see how red it is when she looks.

## 2023-06-09 NOTE — ED PROVIDER NOTES
History     Chief Complaint:  Pharyngitis       The history is provided by the patient.      Suma Iverson is a 77 year old female with history of type II diabetes, hypertension, and hyperlipidemia who presents sore throat. The patient provides that she was seen here for indigestion on 5/21 and was diagnosed with GERD. She has a longstanding history of stomach ulcers and had been having worsening indigestion days prior. She comes to the ED tonight after she began having a sore throat for the last few days with an associated cough. She originally thought it might have been related to her GERD and she has been taking Pantoprazole and Carafate but not Tylenol. She denies any difficulty eating or breathing due to her throat. Further denies any fever or rhinorrhea. Patient notes she has an endoscopy appointment scheduled in 8 days.    Independent Historian:   None - Patient Only        Medications:    Carafate  Pantoprazole    Past Medical History:    Uterine leiomyoma  Increased endometrial stripe thickness  PUD  PRISCA  BRANDON on CPAP  Type II diabetes  Hypertension  Hyperlipidemia  Seizure disorder  Obesity    Past Surgical History:    Uterine fibroid surgery  Cataract extraction with lens implant    Physical Exam     Patient Vitals for the past 24 hrs:   BP Temp Pulse Resp SpO2 Weight   06/09/23 0108 (!) 159/78 -- 66 -- -- --   06/08/23 2200 -- -- -- -- -- 88.8 kg (195 lb 12.3 oz)   06/08/23 2159 (!) 172/92 97.8  F (36.6  C) 70 20 100 % --        Physical Exam  VS: Reviewed per above  HENT: Mucous membranes moist. Uvula midline, no tonsillar hypertrophy nor asymmetry. Tolerating secretions, normal phonation. No nuchal rigidity.  EYES: sclera anicteric  CV: Rate as noted, regular rhythm.   RESP: Effort normal. Breath sounds are normal bilaterally.  NEURO: Alert, moving all extremities  MSK: No deformity of the extremities  SKIN: Warm and dry      Emergency Department Course     Laboratory:  Labs Ordered and Resulted  from Time of ED Arrival to Time of ED Departure   GROUP A STREPTOCOCCUS PCR THROAT SWAB - Normal       Result Value    Group A strep by PCR Not Detected          Emergency Department Course & Assessments:       Interventions:  Medications   acetaminophen (TYLENOL) tablet 1,000 mg (1,000 mg Oral $Given 6/8/23 9485)        Assessments:  2325 I obtained history and examined the patient as noted above.  0103 I rechecked the patient and explained findings.    Independent Interpretation (X-rays, CTs, rhythm strip):  None    Consultations/Discussion of Management or Tests:  None     Disposition:  The patient was discharged to home.     Impression & Plan      CMS Diagnoses: None    Medical Decision Making:  Patient presents to the ER for evaluation of sore throat.  Vital signs reassuring.  No clinical signs of PTA, RPA, epiglottitis, meningitis.  She is still able to tolerate p.o.  She is frustrated by the lack of response to over-the-counter medications.  Rapid strep testing negative.  She has upcoming GI endoscopy in 1 week.  She is worried about underlying cancer.  At this time I do not appreciate obvious signs of oropharyngeal malignancy but further upper endoscopy could evaluate for occult lesions further.  Discussed over-the-counter lozenges and pain medication.  Return precautions discussed prior to discharge.    Diagnosis:    ICD-10-CM    1. Sore throat  J02.9            Discharge Medications:  Discharge Medication List as of 6/9/2023  1:02 AM           Scribe Disclosure:  José Luis ACEVEDO, am serving as a scribe at 11:42 PM on 6/8/2023 to document services personally performed by David Bah MD based on my observations and the provider's statements to me.       David Bah MD  06/09/23 0900

## 2023-06-19 NOTE — ED PROVIDER NOTES
"  History     Chief Complaint:  Abdominal Pain       HPI   Suma Iverson is a 77 year old female presents with indigestion that has been worsening over the past several days. Taking over the counter medications without improvement.        Independent Historian:   None - Patient Only        Medications:    sucralfate (CARAFATE) 1 GM tablet        Past Medical History:    No past medical history on file.    Past Surgical History:    No past surgical history on file.     Physical Exam   BP (!) 140/82   Pulse 84   Temp 97.5  F (36.4  C) (Temporal)   Resp 18   Ht 1.626 m (5' 4\")   Wt 84 kg (185 lb 3 oz)   SpO2 99%   BMI 31.79 kg/m        Physical Exam   General: Patient is alert, awake and interactive when I enter the room  Head: The scalp, face, and head appear normal  Eyes: Conjunctivae are normal  ENT: The nose is normal, Pinnae are normal, External acoustic canals are normal  Neck: Trachea midline  CV: Pulses are normal.   GI: No tenderness   Resp: No respiratory distress   Musc: Normal muscular tone, moving all extremities.  Skin: No rash or lesions noted  Neuro:  Speech is normal and fluent. Face is symmetric.   Psych: Normal affect.  Appropriate interactions.            Emergency Department Course       Imaging:  CT Abdomen Pelvis w Contrast   Final Result   IMPRESSION:    1.  Colonic diverticulosis without evidence of acute diverticulitis.      2.  Fibroid uterus.         Report per radiology    Laboratory:  Labs Ordered and Resulted from Time of ED Arrival to Time of ED Departure   COMPREHENSIVE METABOLIC PANEL - Abnormal       Result Value    Sodium 139      Potassium 3.9      Chloride 102      Carbon Dioxide (CO2) 24      Anion Gap 13      Urea Nitrogen 17.2      Creatinine 0.94      Calcium 9.8      Glucose 161 (*)     Alkaline Phosphatase 107 (*)     AST 23      ALT 10      Protein Total 7.7      Albumin 4.8      Bilirubin Total 0.7      GFR Estimate 63     LIPASE - Normal    Lipase 49   "   TROPONIN T, HIGH SENSITIVITY - Normal    Troponin T, High Sensitivity 12     CBC WITH PLATELETS AND DIFFERENTIAL    WBC Count 10.7      RBC Count 4.90      Hemoglobin 15.6      Hematocrit 46.3      MCV 95      MCH 31.8      MCHC 33.7      RDW 12.4      Platelet Count 185      % Neutrophils 72      % Lymphocytes 21      % Monocytes 6      % Eosinophils 1      % Basophils 0      % Immature Granulocytes 0      NRBCs per 100 WBC 0      Absolute Neutrophils 7.6      Absolute Lymphocytes 2.3      Absolute Monocytes 0.7      Absolute Eosinophils 0.1      Absolute Basophils 0.0      Absolute Immature Granulocytes 0.0      Absolute NRBCs 0.0          Emergency Department Course & Assessments:    Interventions:  Medications   famotidine (PEPCID) injection 20 mg (20 mg Intravenous $Given 5/21/23 0206)   iopamidol (ISOVUE-370) solution 500 mL (93 mLs Intravenous $Given 5/21/23 0221)   Sodium Chloride for CT Scan Flush Use (63 mLs Intravenous $Given 5/21/23 0221)      Disposition:  The patient was discharged to home.     Impression & Plan      Medical Decision Making:  Suma Iverson is a 77 year old female who presents for evaluation of epigastric abdominal pain.  I considered a broad differential diagnosis for this patient including gastritis, GERD, cholecystitis, choledocholithiasis, biliary colic, pancreatitis, colonic or small bowel pathology, vascular etiologies including aneurysm, ulcer.  CT scan negative.  There are no signs of any serious etiologies as mentioned above or intraabdominal catastrophes.  I highly doubt this is a PE or acute coronary syndrome and as she is tender in the abdomen would not do any further workup for this.  Doubt perforated ulcer at this point.  Will refer back to primary and do scheduled medication for stomach acid reduction x 14 days.  Consider endoscopy if further symptoms despite this.  Gastritis precautions given for home.       Diagnosis:    ICD-10-CM    1. Gastroesophageal reflux  disease without esophagitis  K21.9            Discharge Medications:  Discharge Medication List as of 5/21/2023  4:01 AM      START taking these medications    Details   sucralfate (CARAFATE) 1 GM tablet Take 1 tablet (1 g) by mouth 2 times daily, Disp-30 tablet, R-0, Local Print                MD Ravi Stokes, Bentley Dwyer MD  06/19/23 7453

## 2023-10-14 ENCOUNTER — APPOINTMENT (OUTPATIENT)
Dept: CT IMAGING | Facility: CLINIC | Age: 78
End: 2023-10-14
Attending: EMERGENCY MEDICINE
Payer: MEDICARE

## 2023-10-14 ENCOUNTER — APPOINTMENT (OUTPATIENT)
Dept: GENERAL RADIOLOGY | Facility: CLINIC | Age: 78
End: 2023-10-14
Attending: EMERGENCY MEDICINE
Payer: MEDICARE

## 2023-10-14 ENCOUNTER — HOSPITAL ENCOUNTER (EMERGENCY)
Facility: CLINIC | Age: 78
Discharge: HOME | End: 2023-10-14
Attending: EMERGENCY MEDICINE | Admitting: EMERGENCY MEDICINE
Payer: MEDICARE

## 2023-10-14 VITALS
HEART RATE: 82 BPM | DIASTOLIC BLOOD PRESSURE: 78 MMHG | SYSTOLIC BLOOD PRESSURE: 133 MMHG | OXYGEN SATURATION: 96 % | RESPIRATION RATE: 18 BRPM | TEMPERATURE: 98.5 F

## 2023-10-14 DIAGNOSIS — U07.1 INFECTION DUE TO 2019 NOVEL CORONAVIRUS: ICD-10-CM

## 2023-10-14 LAB
ALBUMIN SERPL BCG-MCNC: 4.3 G/DL (ref 3.5–5.2)
ALP SERPL-CCNC: 94 U/L (ref 35–104)
ALT SERPL W P-5'-P-CCNC: 13 U/L (ref 0–50)
ANION GAP SERPL CALCULATED.3IONS-SCNC: 14 MMOL/L (ref 7–15)
AST SERPL W P-5'-P-CCNC: 24 U/L (ref 0–45)
BASO+EOS+MONOS # BLD AUTO: ABNORMAL 10*3/UL
BASO+EOS+MONOS NFR BLD AUTO: ABNORMAL %
BASOPHILS # BLD AUTO: 0 10E3/UL (ref 0–0.2)
BASOPHILS NFR BLD AUTO: 0 %
BILIRUB SERPL-MCNC: 0.5 MG/DL
BUN SERPL-MCNC: 18 MG/DL (ref 8–23)
CALCIUM SERPL-MCNC: 9.3 MG/DL (ref 8.8–10.2)
CHLORIDE SERPL-SCNC: 108 MMOL/L (ref 98–107)
CREAT SERPL-MCNC: 0.79 MG/DL (ref 0.51–0.95)
DEPRECATED HCO3 PLAS-SCNC: 19 MMOL/L (ref 22–29)
EGFRCR SERPLBLD CKD-EPI 2021: 77 ML/MIN/1.73M2
EOSINOPHIL # BLD AUTO: 0.1 10E3/UL (ref 0–0.7)
EOSINOPHIL NFR BLD AUTO: 1 %
ERYTHROCYTE [DISTWIDTH] IN BLOOD BY AUTOMATED COUNT: 12.5 % (ref 10–15)
FLUAV RNA SPEC QL NAA+PROBE: NEGATIVE
FLUBV RNA RESP QL NAA+PROBE: NEGATIVE
GLUCOSE SERPL-MCNC: 135 MG/DL (ref 70–99)
GROUP A STREP BY PCR: NOT DETECTED
HCT VFR BLD AUTO: 42.3 % (ref 35–47)
HGB BLD-MCNC: 14.3 G/DL (ref 11.7–15.7)
HOLD SPECIMEN: NORMAL
IMM GRANULOCYTES # BLD: 0 10E3/UL
IMM GRANULOCYTES NFR BLD: 0 %
INR PPP: 1 (ref 0.85–1.15)
LACTATE SERPL-SCNC: 1.4 MMOL/L (ref 0.7–2)
LYMPHOCYTES # BLD AUTO: 0.9 10E3/UL (ref 0.8–5.3)
LYMPHOCYTES NFR BLD AUTO: 10 %
MCH RBC QN AUTO: 32.1 PG (ref 26.5–33)
MCHC RBC AUTO-ENTMCNC: 33.8 G/DL (ref 31.5–36.5)
MCV RBC AUTO: 95 FL (ref 78–100)
MONOCYTES # BLD AUTO: 0.6 10E3/UL (ref 0–1.3)
MONOCYTES NFR BLD AUTO: 7 %
NEUTROPHILS # BLD AUTO: 7.4 10E3/UL (ref 1.6–8.3)
NEUTROPHILS NFR BLD AUTO: 82 %
NRBC # BLD AUTO: 0 10E3/UL
NRBC BLD AUTO-RTO: 0 /100
NT-PROBNP SERPL-MCNC: 124 PG/ML (ref 0–1800)
PLATELET # BLD AUTO: 147 10E3/UL (ref 150–450)
POTASSIUM SERPL-SCNC: 4 MMOL/L (ref 3.4–5.3)
PROT SERPL-MCNC: 6.6 G/DL (ref 6.4–8.3)
RBC # BLD AUTO: 4.45 10E6/UL (ref 3.8–5.2)
RSV RNA SPEC NAA+PROBE: NEGATIVE
SARS-COV-2 RNA RESP QL NAA+PROBE: POSITIVE
SODIUM SERPL-SCNC: 141 MMOL/L (ref 135–145)
TROPONIN T SERPL HS-MCNC: 8 NG/L
WBC # BLD AUTO: 9.1 10E3/UL (ref 4–11)

## 2023-10-14 PROCEDURE — 87651 STREP A DNA AMP PROBE: CPT | Performed by: EMERGENCY MEDICINE

## 2023-10-14 PROCEDURE — 84484 ASSAY OF TROPONIN QUANT: CPT | Performed by: EMERGENCY MEDICINE

## 2023-10-14 PROCEDURE — 93005 ELECTROCARDIOGRAM TRACING: CPT | Mod: RTG

## 2023-10-14 PROCEDURE — 74177 CT ABD & PELVIS W/CONTRAST: CPT | Mod: MA

## 2023-10-14 PROCEDURE — 85610 PROTHROMBIN TIME: CPT | Performed by: EMERGENCY MEDICINE

## 2023-10-14 PROCEDURE — 99285 EMERGENCY DEPT VISIT HI MDM: CPT | Mod: 25

## 2023-10-14 PROCEDURE — 71046 X-RAY EXAM CHEST 2 VIEWS: CPT

## 2023-10-14 PROCEDURE — 250N000009 HC RX 250: Performed by: EMERGENCY MEDICINE

## 2023-10-14 PROCEDURE — 83880 ASSAY OF NATRIURETIC PEPTIDE: CPT | Performed by: EMERGENCY MEDICINE

## 2023-10-14 PROCEDURE — 85025 COMPLETE CBC W/AUTO DIFF WBC: CPT | Performed by: EMERGENCY MEDICINE

## 2023-10-14 PROCEDURE — 83605 ASSAY OF LACTIC ACID: CPT | Performed by: EMERGENCY MEDICINE

## 2023-10-14 PROCEDURE — 250N000011 HC RX IP 250 OP 636: Performed by: EMERGENCY MEDICINE

## 2023-10-14 PROCEDURE — 36415 COLL VENOUS BLD VENIPUNCTURE: CPT | Performed by: EMERGENCY MEDICINE

## 2023-10-14 PROCEDURE — 87637 SARSCOV2&INF A&B&RSV AMP PRB: CPT | Performed by: EMERGENCY MEDICINE

## 2023-10-14 PROCEDURE — 80053 COMPREHEN METABOLIC PANEL: CPT | Performed by: EMERGENCY MEDICINE

## 2023-10-14 RX ORDER — ALBUTEROL SULFATE 90 UG/1
2 AEROSOL, METERED RESPIRATORY (INHALATION) EVERY 6 HOURS PRN
Qty: 18 G | Refills: 0 | Status: SHIPPED | OUTPATIENT
Start: 2023-10-14

## 2023-10-14 RX ORDER — BENZONATATE 100 MG/1
100 CAPSULE ORAL 3 TIMES DAILY PRN
Qty: 15 CAPSULE | Refills: 0 | Status: SHIPPED | OUTPATIENT
Start: 2023-10-14 | End: 2023-10-19

## 2023-10-14 RX ORDER — IOPAMIDOL 755 MG/ML
500 INJECTION, SOLUTION INTRAVASCULAR ONCE
Status: COMPLETED | OUTPATIENT
Start: 2023-10-14 | End: 2023-10-14

## 2023-10-14 RX ADMIN — SODIUM CHLORIDE 80 ML: 9 INJECTION, SOLUTION INTRAVENOUS at 18:39

## 2023-10-14 RX ADMIN — IOPAMIDOL 80 ML: 755 INJECTION, SOLUTION INTRAVENOUS at 18:38

## 2023-10-14 ASSESSMENT — ACTIVITIES OF DAILY LIVING (ADL)
ADLS_ACUITY_SCORE: 35
ADLS_ACUITY_SCORE: 35

## 2023-10-14 NOTE — ED PROVIDER NOTES
History     Chief Complaint:  Flu Symptoms, Abdominal Pain, and Shortness of Breath       HPI   Suma Iverson is a 78 year old female with a history of type 2 diabetes, hypertension, seizure disorder who presents with cough. The patient states that for the past 2 days she has been experiencing a productive cough with clear mucus that causes upper abdominal pain from it. She also endorses a lack of appetite. She has had her flu shot this year. She denies any recent travel or sick contacts. She denies any fever, new back, hip or pelvic pain, vomiting.     Independent Historian:   None - Patient Only    Review of External Notes:   none     Medications:    Carafate   Valium   Prilosec   Atarax   Oretic   Zocor   Protonix   Abilify     Past Medical History:    Uterine leiomyoma   Peptic ulcer disease   Anxiety   BRANDON on CPAP   Type 2 diabetes   Hypertension   Hyperlipidemia   Seizure disorder   Obesity     Past Surgical History:    Uterine fibroid surgery      Physical Exam   Patient Vitals for the past 24 hrs:   BP Temp Temp src Pulse Resp SpO2   10/14/23 1900 (!) 145/83 -- -- 91 12 96 %   10/14/23 1845 (!) 141/75 -- -- 83 23 98 %   10/14/23 1713 -- -- -- -- 20 --   10/14/23 1658 (!) 170/92 -- -- 94 -- 97 %   10/14/23 1653 (!) 156/93 -- -- -- -- --   10/14/23 1641 (!) 191/118 98.3  F (36.8  C) Temporal 103 (!) 32 100 %        Physical Exam  Constitutional:       Appearance: She is well-developed.   HENT:      Right Ear: External ear normal.      Left Ear: External ear normal.      Mouth/Throat:      Mouth: Mucous membranes are moist.      Pharynx: Oropharynx is clear. No oropharyngeal exudate or posterior oropharyngeal erythema.   Eyes:      General: No scleral icterus.     Extraocular Movements: Extraocular movements intact.      Conjunctiva/sclera: Conjunctivae normal.      Pupils: Pupils are equal, round, and reactive to light.   Cardiovascular:      Rate and Rhythm: Normal rate and regular rhythm.      Heart  sounds: Normal heart sounds. No murmur heard.     No friction rub. No gallop.   Pulmonary:      Effort: Pulmonary effort is normal. No respiratory distress.      Breath sounds: Normal breath sounds. No stridor. No wheezing, rhonchi or rales.   Chest:      Chest wall: No tenderness.   Abdominal:      General: Bowel sounds are normal. There is no distension.      Palpations: Abdomen is soft. There is no mass.      Tenderness: There is no abdominal tenderness.   Musculoskeletal:         General: Normal range of motion.      Cervical back: Normal range of motion and neck supple.   Skin:     General: Skin is warm and dry.      Capillary Refill: Capillary refill takes less than 2 seconds.      Findings: No rash.   Neurological:      General: No focal deficit present.      Mental Status: She is alert.           Emergency Department Course   ECG  ECG results from 10/14/23   EKG 12-lead, tracing only     Value    Systolic Blood Pressure     Diastolic Blood Pressure     Ventricular Rate 85    Atrial Rate 85    MD Interval 170    QRS Duration 90        QTc 416    P Axis 17    R AXIS -15    T Axis 26    Interpretation ECG      Sinus rhythm  Normal ECG  When compared with ECG of 21-MAY-2023 00:49,  ST no longer depressed in Anterior leads       Imaging:  CT Chest (PE) Abdomen Pelvis w Contrast   Final Result   IMPRESSION:   1.  No evidence for pulmonary emboli.   2.  No evidence for acute pulmonary disease.   3.  Uterine fibroids.   4.  Sigmoid diverticulosis.   5.  No findings to account for the patient's symptoms.      XR Chest 2 Views   Final Result   IMPRESSION: Mild right basilar atelectasis. No pleural effusion. The cardiac silhouette and pulmonary vasculature are normal.           Laboratory:  Labs Ordered and Resulted from Time of ED Arrival to Time of ED Departure   COMPREHENSIVE METABOLIC PANEL - Abnormal       Result Value    Sodium 141      Potassium 4.0      Carbon Dioxide (CO2) 19 (*)     Anion Gap 14       Urea Nitrogen 18.0      Creatinine 0.79      GFR Estimate 77      Calcium 9.3      Chloride 108 (*)     Glucose 135 (*)     Alkaline Phosphatase 94      AST 24      ALT 13      Protein Total 6.6      Albumin 4.3      Bilirubin Total 0.5     INFLUENZA A/B, RSV, & SARS-COV2 PCR - Abnormal    Influenza A PCR Negative      Influenza B PCR Negative      RSV PCR Negative      SARS CoV2 PCR Positive (*)    CBC WITH PLATELETS AND DIFFERENTIAL - Abnormal    WBC Count 9.1      RBC Count 4.45      Hemoglobin 14.3      Hematocrit 42.3      MCV 95      MCH 32.1      MCHC 33.8      RDW 12.5      Platelet Count 147 (*)     % Neutrophils 82      % Lymphocytes 10      % Monocytes 7      Mids % (Monos, Eos, Basos)        % Eosinophils 1      % Basophils 0      % Immature Granulocytes 0      NRBCs per 100 WBC 0      Absolute Neutrophils 7.4      Absolute Lymphocytes 0.9      Absolute Monocytes 0.6      Mids Abs (Monos, Eos, Basos)        Absolute Eosinophils 0.1      Absolute Basophils 0.0      Absolute Immature Granulocytes 0.0      Absolute NRBCs 0.0     INR - Normal    INR 1.00     TROPONIN T, HIGH SENSITIVITY - Normal    Troponin T, High Sensitivity 8     LACTIC ACID WHOLE BLOOD - Normal    Lactic Acid 1.4     NT PROBNP INPATIENT - Normal    N terminal Pro BNP Inpatient 124     GROUP A STREPTOCOCCUS PCR THROAT SWAB - Normal    Group A strep by PCR Not Detected     ROUTINE UA WITH MICROSCOPIC REFLEX TO CULTURE      Emergency Department Course & Assessments:       Interventions:  Medications   HYDROmorphone (DILAUDID) injection 1 mg (has no administration in time range)   iopamidol (ISOVUE-370) solution 500 mL (80 mLs Intravenous $Given 10/14/23 1838)   Sodium Chloride for CT Scan Flush Use (80 mLs Intravenous $Given 10/14/23 1839)      Assessments:  1647 I consulted with the patient and obtained history as shown above  1910 I rechecked the patient and explained exam results     Independent Interpretation (X-rays, CTs, rhythm  strip):  None    Consultations/Discussion of Management or Tests:  None        Social Determinants of Health affecting care:   None    Disposition:  The patient was discharged to home.     Impression & Plan    CMS Diagnoses: None    Medical Decision Making:  Patient presents today for evaluation of cough and shortness of breath.  Patient was not hypoxic on arrival.  She was coughing constantly but nonproductive.  She did test positive for COVID.  She has not received a booster for this yet.  She was monitored here without any hypoxia.  Given her COVID status, we did do a CT chest.  There are no signs of PE or pneumonia.  She is feeling better at this point.  I discussed with her use of Paxlovid.  She wants to go ahead and start that.  I reviewed her medication list and asked her to stop taking simvastatin and Valium while she is taking Paxlovid.  She can resume simvastatin 5 days after last dose of Paxlovid.  She can resume Valium 3 days after last dose of Paxlovid.  Instructions are clearly written on her paperwork.  I will have her use albuterol for breathing as well as cough medication.  She has a pulse oximeter that she can use to monitor her oxygen.  She is advised to return if the falls to 90 or less.  She is agreeable with the follow-up plan.  Turn precaution provided.  Patient passed ambulatory pulse ox test.      Diagnosis:    ICD-10-CM    1. Infection due to 2019 novel coronavirus  U07.1            Discharge Medications:  New Prescriptions    ALBUTEROL (PROAIR HFA/PROVENTIL HFA/VENTOLIN HFA) 108 (90 BASE) MCG/ACT INHALER    Inhale 2 puffs into the lungs every 6 hours as needed for shortness of breath, wheezing or cough    BENZONATATE (TESSALON) 100 MG CAPSULE    Take 1 capsule (100 mg) by mouth 3 times daily as needed for cough    NIRMATRELVIR AND RITONAVIR (PAXLOVID) 300 MG/100 MG THERAPY PACK    Take 3 tablets by mouth 2 times daily for 5 days      Scribe Disclosure:  I, Xander Zafar, am serving  as a scribe at 4:54 PM on 10/14/2023 to document services personally performed by Jerod Sawant MD based on my observations and the provider's statements to me.     10/14/2023   Jerod Sawant MD Cheng, Wenlan, MD  10/14/23 2053

## 2023-10-14 NOTE — ED TRIAGE NOTES
Pt arrives with c/o flu sx since yesterday, a coughing attack today that let the pt to be very SOB and have abdominal and back pain. Pt hyperventilating on her husbands oxygen upon arrival to triage. Pt reporting she can't talk intermittently but talking well when distracted. Deep breathing techniques used in triage with improvement.

## 2023-10-15 NOTE — DISCHARGE INSTRUCTIONS
Use pulse oximeter to check your oxygen. Return to the ER if the reading falls to 90 or below  Start Paxlovid as soon as you get it  Albuterol for cough and breathing issue  Do no take your simvastatin/zocor while on paxlovid. Do not restart simvastatin until 5 days after the last dose of paxlovid  Do not take your valium with paxlovid. Restart valium 3 days after the last dose of paxlovid  Return if breathing worsens   Detail Level: Detailed Size Of Lesion In Cm (Optional): 0 Morphology Per Location (Optional): 4mm x 4mm brown nevus

## 2023-10-16 LAB
ATRIAL RATE - MUSE: 85 BPM
DIASTOLIC BLOOD PRESSURE - MUSE: NORMAL MMHG
INTERPRETATION ECG - MUSE: NORMAL
P AXIS - MUSE: 17 DEGREES
PR INTERVAL - MUSE: 170 MS
QRS DURATION - MUSE: 90 MS
QT - MUSE: 350 MS
QTC - MUSE: 416 MS
R AXIS - MUSE: -15 DEGREES
SYSTOLIC BLOOD PRESSURE - MUSE: NORMAL MMHG
T AXIS - MUSE: 26 DEGREES
VENTRICULAR RATE- MUSE: 85 BPM

## 2023-10-20 ENCOUNTER — HOSPITAL ENCOUNTER (EMERGENCY)
Facility: CLINIC | Age: 78
Discharge: HOME | End: 2023-10-20
Attending: EMERGENCY MEDICINE | Admitting: EMERGENCY MEDICINE
Payer: MEDICARE

## 2023-10-20 ENCOUNTER — NURSE TRIAGE (OUTPATIENT)
Dept: FAMILY MEDICINE | Facility: CLINIC | Age: 78
End: 2023-10-20
Payer: MEDICARE

## 2023-10-20 VITALS
OXYGEN SATURATION: 96 % | SYSTOLIC BLOOD PRESSURE: 133 MMHG | TEMPERATURE: 97.6 F | DIASTOLIC BLOOD PRESSURE: 94 MMHG | HEART RATE: 83 BPM | RESPIRATION RATE: 18 BRPM

## 2023-10-20 DIAGNOSIS — R19.7 DIARRHEA, UNSPECIFIED TYPE: ICD-10-CM

## 2023-10-20 DIAGNOSIS — U07.1 COVID-19 VIRUS INFECTION: ICD-10-CM

## 2023-10-20 LAB
ALBUMIN SERPL BCG-MCNC: 4.1 G/DL (ref 3.5–5.2)
ALP SERPL-CCNC: 77 U/L (ref 35–104)
ALT SERPL W P-5'-P-CCNC: 11 U/L (ref 0–50)
ANION GAP SERPL CALCULATED.3IONS-SCNC: 12 MMOL/L (ref 7–15)
AST SERPL W P-5'-P-CCNC: 17 U/L (ref 0–45)
BASO+EOS+MONOS # BLD AUTO: ABNORMAL 10*3/UL
BASO+EOS+MONOS NFR BLD AUTO: ABNORMAL %
BASOPHILS # BLD AUTO: 0 10E3/UL (ref 0–0.2)
BASOPHILS NFR BLD AUTO: 0 %
BILIRUB DIRECT SERPL-MCNC: <0.2 MG/DL (ref 0–0.3)
BILIRUB SERPL-MCNC: 0.6 MG/DL
BUN SERPL-MCNC: 19.1 MG/DL (ref 8–23)
CALCIUM SERPL-MCNC: 9.2 MG/DL (ref 8.8–10.2)
CHLORIDE SERPL-SCNC: 106 MMOL/L (ref 98–107)
CREAT SERPL-MCNC: 0.79 MG/DL (ref 0.51–0.95)
DEPRECATED HCO3 PLAS-SCNC: 21 MMOL/L (ref 22–29)
EGFRCR SERPLBLD CKD-EPI 2021: 77 ML/MIN/1.73M2
EOSINOPHIL # BLD AUTO: 0 10E3/UL (ref 0–0.7)
EOSINOPHIL NFR BLD AUTO: 0 %
ERYTHROCYTE [DISTWIDTH] IN BLOOD BY AUTOMATED COUNT: 12.1 % (ref 10–15)
GLUCOSE SERPL-MCNC: 149 MG/DL (ref 70–99)
HCT VFR BLD AUTO: 43.4 % (ref 35–47)
HGB BLD-MCNC: 14.8 G/DL (ref 11.7–15.7)
IMM GRANULOCYTES # BLD: 0 10E3/UL
IMM GRANULOCYTES NFR BLD: 0 %
LYMPHOCYTES # BLD AUTO: 1.6 10E3/UL (ref 0.8–5.3)
LYMPHOCYTES NFR BLD AUTO: 30 %
MCH RBC QN AUTO: 32.4 PG (ref 26.5–33)
MCHC RBC AUTO-ENTMCNC: 34.1 G/DL (ref 31.5–36.5)
MCV RBC AUTO: 95 FL (ref 78–100)
MONOCYTES # BLD AUTO: 0.3 10E3/UL (ref 0–1.3)
MONOCYTES NFR BLD AUTO: 6 %
NEUTROPHILS # BLD AUTO: 3.5 10E3/UL (ref 1.6–8.3)
NEUTROPHILS NFR BLD AUTO: 64 %
NRBC # BLD AUTO: 0 10E3/UL
NRBC BLD AUTO-RTO: 0 /100
PLATELET # BLD AUTO: 149 10E3/UL (ref 150–450)
POTASSIUM SERPL-SCNC: 3.5 MMOL/L (ref 3.4–5.3)
PROT SERPL-MCNC: 6.5 G/DL (ref 6.4–8.3)
RBC # BLD AUTO: 4.57 10E6/UL (ref 3.8–5.2)
SODIUM SERPL-SCNC: 139 MMOL/L (ref 135–145)
WBC # BLD AUTO: 5.5 10E3/UL (ref 4–11)

## 2023-10-20 PROCEDURE — 36415 COLL VENOUS BLD VENIPUNCTURE: CPT | Performed by: EMERGENCY MEDICINE

## 2023-10-20 PROCEDURE — 82248 BILIRUBIN DIRECT: CPT | Performed by: EMERGENCY MEDICINE

## 2023-10-20 PROCEDURE — 99283 EMERGENCY DEPT VISIT LOW MDM: CPT

## 2023-10-20 PROCEDURE — 85025 COMPLETE CBC W/AUTO DIFF WBC: CPT | Performed by: EMERGENCY MEDICINE

## 2023-10-20 ASSESSMENT — ACTIVITIES OF DAILY LIVING (ADL): ADLS_ACUITY_SCORE: 35

## 2023-10-20 NOTE — TELEPHONE ENCOUNTER
"Patient calling back.  Patient crying and upset that the last nurse \"hung up on her\".  Patient advised that if she thinks her liver is affected by the Paxlovid that she needs to be seen. Patient doesn't have a current primary care provider stating that the one she has doesn't ever do anything for her and just stares at her computer screen and sends her to the lab.      Advised that since she doesn't have a primary care provider, recommendations are to go to an UC/ER.  Patient stated I am not going to the UC, they don't do anything either\".  patient will go to the ER.   "

## 2023-10-20 NOTE — ED PROVIDER NOTES
History     Chief Complaint:  Medication Reaction       HPI   Suma Iverson is a 78-year-old female with past medical history significant for recent COVID diagnosis on 10/14/2023 who presents to the emergency department with a chief complaint of transient abdominal pain that now resolved and light-colored diarrhea onset a couple of days ago having since resolved.  Patient reports that she was seen in the emergency department on 10/14/2023 and diagnosed with COVID having started Paxlovid with last doses being yesterday.  She reports that she read the adverse reactions and was concerned that she may have liver issues given her symptoms.  She denies vomiting but endorses nausea, denies black or bloody stools.    Independent Historian:    None     Review of External Notes:  I reviewed medical records from ED note on 10/14/2023, family medicine office visit on 9/29/2023     Medications:    Albuterol   Carafate     Past Medical History:    Uterine leiomyoma   PUD   PRISCA   BRANDON   Type 2 DM   HTN   HLD   Seizure disorder   Obesity     Past Surgical History:    Uterine fibroid surgery   Unspecified eye cataract extraction w/ implant     Physical Exam   Patient Vitals for the past 24 hrs:   BP Temp Temp src Pulse Resp SpO2   10/20/23 1515 -- -- -- -- -- 96 %   10/20/23 1500 (!) 133/94 -- -- 83 -- 100 %   10/20/23 1445 -- -- -- -- -- 97 %   10/20/23 1430 118/71 -- -- 80 -- 98 %   10/20/23 1400 (!) 132/97 -- -- 75 -- 99 %   10/20/23 1345 133/72 -- -- 74 18 100 %   10/20/23 1301 (!) 145/99 97.6  F (36.4  C) Temporal 87 18 99 %        Physical Exam  Constitutional: Well developed, nontox appearance  Head: Atraumatic.   Neck:  no stridor  Eyes: no scleral icterus  Cardiovascular: RRR, 2+ left radial pulses  Pulmonary/Chest: nml resp effort, Clear BS bilat  Abdominal: ND, soft, NT, no rebound or guarding   Ext: Warm, well perfused, no edema  Neurological: A&O, symmetric facies, moves ext x4  Skin: Skin is warm and dry.    Psychiatric: Somewhat anxious  Nursing note and vitals reviewed.    Emergency Department Course   Laboratory:  Labs Ordered and Resulted from Time of ED Arrival to Time of ED Departure   BASIC METABOLIC PANEL - Abnormal       Result Value    Sodium 139      Potassium 3.5      Chloride 106      Carbon Dioxide (CO2) 21 (*)     Anion Gap 12      Urea Nitrogen 19.1      Creatinine 0.79      GFR Estimate 77      Calcium 9.2      Glucose 149 (*)    CBC WITH PLATELETS AND DIFFERENTIAL - Abnormal    WBC Count 5.5      RBC Count 4.57      Hemoglobin 14.8      Hematocrit 43.4      MCV 95      MCH 32.4      MCHC 34.1      RDW 12.1      Platelet Count 149 (*)     % Neutrophils 64      % Lymphocytes 30      % Monocytes 6      Mids % (Monos, Eos, Basos)        % Eosinophils 0      % Basophils 0      % Immature Granulocytes 0      NRBCs per 100 WBC 0      Absolute Neutrophils 3.5      Absolute Lymphocytes 1.6      Absolute Monocytes 0.3      Mids Abs (Monos, Eos, Basos)        Absolute Eosinophils 0.0      Absolute Basophils 0.0      Absolute Immature Granulocytes 0.0      Absolute NRBCs 0.0     HEPATIC FUNCTION PANEL - Normal    Protein Total 6.5      Albumin 4.1      Bilirubin Total 0.6      Alkaline Phosphatase 77      AST 17      ALT 11      Bilirubin Direct <0.20        Emergency Department Course & Assessments:    Interventions:  Medications - No data to display     Assessments:  1518 I obtained history and examined the patient as noted above. I explained findings and discussed plan for discharge home.    Independent Interpretation (X-rays, CTs, rhythm strip):  None    Consultations/Discussion of Management or Tests:  None     Disposition:  The patient was discharged to home.     Impression & Plan    Medical Decision Makin year old female presenting w/ recent COVID diagnosis having just finished Paxlovid, transient abdominal pain, diarrhea     Social determinants affecting patient's health include: Age increasing  risk for presentation to the emergency department, generalized anxiety disorder increasing risk for presentation to the emergency department     I reviewed medical records from ED note on 10/14/2023, family medicine office visit on 9/29/2023     Patient's presentation is consistent with COVID and possible mild adverse reactions to Paxlovid.  No evidence of anaphylaxis or allergic reaction.  Her vital signs are within normal limits, pulmonary and abdominal exams are benign.  The patient does seem somewhat anxious.  She is encouraged after further discussion.  Labs ordered as noted above and negative for significant abnormality with no evidence of acute hepatitis.  Patient was advised regarding the natural course of COVID.  I do not feel further work-up or imaging is indicated at this time.  At this time I feel the pt is safe for discharge.  Recommendations given regarding follow up with PCP and return to the emergency department as needed for new or worsening symptoms.  Patient counseled on all results, disposition and diagnosis.  They are understanding and agreeable to plan. Patient discharged in stable condition.      Diagnosis:    ICD-10-CM    1. COVID-19 virus infection  U07.1       2. Diarrhea, unspecified type  R19.7         Scribe Disclosure:  I, Kenyonnoemi Luna, am serving as a scribe at 2:56 PM on 10/20/2023 to document services personally performed by Bentley Meyers MD based on my observations and the provider's statements to me.  10/20/2023   Bentley Meyers MD Vaughn, Christopher E, MD  10/20/23 4662

## 2023-10-20 NOTE — DISCHARGE INSTRUCTIONS
Discharge Instructions  COVID-19    COVID-19 is the disease caused by a new coronavirus. The virus spreads from person-to-person primarily by droplets when an infected person coughs or sneezes and the droplets are then breathed in by another person.    Symptoms of COVID-19  Many people have no symptoms or mild symptoms.  Symptoms usually appear within a few days, but up to 14-days, after contact with a person with COVID-19.    A mild COVID-19 illness is like a cold and can have fever, cough, sneezing, sore throat, tiredness, headache, and muscle pain.    A moderate COVID-19 illness might include shortness of breath or pneumonia on a chest x-ray.    A severe COVID-19 illness causes significant breathing problems such as low oxygen levels or more serious pneumonia.  Some patients experience loss of taste or smell which is somewhat unique to COVID-19.      Isolation and Quarantine  Testing is recommended for any person with symptoms that could be COVID-19 and often for those exposed to COVID-19. The best way to stop the spread of the virus is to avoid contact with others.    A close contact exposure is being within 6 feet of someone with COVID for 15 minutes.    Isolation refers to sick people staying away from people who are not sick.    A person in quarantine is limiting activity because they were exposed and are waiting to see if they might become sick.    If you test positive for COVID and have no symptoms, you should stay home (isolation) for 5 full days after the day of the test. You should then wear a mask when around others for another 5 days.    If you test positive for COVID and have mild symptoms, you should stay home (isolation) for at least 5 days after your symptoms began. You can return to normal activities at that time, wearing a mask when around others, for another 5 days as long as your symptoms are improving/resolving and you have been without a fever for 24 hours (without using  fever-reducing medicine).    If you test positive for COVID and have more than mild symptoms, you should stay home (isolation) for at least 10 days after your symptoms began. You can return to normal activities at that time as long as your symptoms are improving and you have been without a fever for 24 hours (without using fever-reducing medicine).  For example, if you have a fever and cough for 6 days, you need to stay home 4 more days with no fever for a total of 10 days. Or, if you have a fever and cough for 10 days, you need to stay home one more day with no fever for a total of 11 days.    If you were exposed to COVID and are not vaccinated (or it has been more than six months from your Pfizer or Moderna vaccine or two months from J&J vaccine), you should stay home (quarantine) for 5 days and then wear a mask around others for 5 additional days. A COVID test at day 5 is recommended.    If you were exposed to COVID and are vaccinated (had a booster, had two shots of Pfizer or Moderna vaccine in the last five months, or had J&J vaccine within two months), you do not need to quarantine but should wear a mask around others for 10 days and get a COVID test on day 5.    If you have symptoms but a negative test, you should stay at home until you have mild/improving symptoms and are without fever for 24 hours, using the same judgment you would for when it is safe to return to work/school from strep throat, influenza, or the common cold. If you worsen, you should consider being re-evaluated.    If you are being tested for COVID because of symptoms and your test is pending, you should stay home until you know your test result.  More details on isolation and quarantine can be found on this website from the CDC:  https://www.cdc.gov/coronavirus/2019-ncov/your-health/quarantine-isolation.html    If I have COVID, how should I protect myself and others?    Do not go to work or school. Have a friend or relative do your  shopping. Do not use public transportation (bus, train) or ridesharing (Lyft, Uber).    Separate yourself from other people in your home. As much as possible, you should stay in one room and away from other people in your home. Also, use a separate bathroom, if possible. Avoid handling pets or other animals while sick.     Wear a facemask if you need to be around other people and cover your mouth and nose with a tissue when you cough or sneeze.     Avoid sharing personal household items. You should not share dishes, drinking glasses, forks/knives/spoons, towels, or bedding with other people in your home. After using these items, they should be washed with soap and water. Clean parts of your home that are touched often (doorknobs, faucets, countertops, etc.) daily.     Wash your hands often with soap and water for at least 20 seconds or use an alcohol-based hand  containing at least 60% alcohol.     Avoid touching your face.    Treat your symptoms. You can take Acetaminophen (Tylenol) to treat body aches and fever as needed for comfort. Ibuprofen (Advil or Motrin) can be used as well if you still have symptoms after taking Tylenol. Drink fluids. Rest.    Watch for worsening symptoms such as shortness of breath/difficulty breathing or very severe weakness.    Employers/workplaces are being asked by the Centers for Disease Control (CDC) to not request notes/documentation for you to return to work or prove that you were ill. You may choose to show your employer this paperwork. Also, repeat testing should not be required to return to work.    Exercise/Sports in rare cases, COVID could affect your heart in a way that makes exercise or participation in sports dangerous.  If you have a mild COVID illness (fever, cough, sore throat, and similar symptoms but no difficulty breathing or abnormalities of the lung): After your COVID symptoms have resolved, wait 14-days before returning to activity.  If you have more than  a mild illness (meaning that you have problems with your breathing or lungs) or if you participate in competitive or strenuous activity or have a history of heart disease: Please see your primary doctor/provider prior to return to activity/competition.    COVID treatments such as antiviral and antibody medications are available. They are recommended for those patients who have a risk for developing more severe COVID illness. Importantly, the treatments must be started early in the illness (within 5-7 days, depending on which treatment). These treatments may have been considered today during your visit. If you have other questions, contact your primary doctor/clinic.     You can learn more about COVID treatments from the [place] [facility]:  [email or web address]    Return to the Emergency Department if:    If you are developing worsening breathing, shortness of breath, or feel worse you should seek medical attention.  If you are uncertain, contact your health care provider/clinic. If you need emergency medical attention, call 911 and tell them you have been ill.      Discharge Instructions  Adult Diarrhea    You have been seen today for diarrhea (loose stools). This is usually caused by a virus, but some bacteria, parasites, medicines, or other medical conditions can cause similar symptoms. At this time your provider does not find that your diarrhea is a sign of anything dangerous or life-threatening. However, sometimes the signs of serious illness do not show up right away. If you have new or worse symptoms, you may need to be seen again in the Emergency Department or by your primary provider.     Generally, every Emergency Department visit should have a follow-up clinic visit with either a primary or a specialty clinic/provider. Please follow-up as instructed by your emergency provider today.    Return to the Emergency Department if:  You feel you are getting  "dehydrated, such as being very thirsty, not urinating (peeing) like usual, or feeling faint or lightheaded.   You develop a new fever.  You have abdominal (belly) pain that seems worse than cramps, is in one spot, or is getting worse over time.   You have blood in your stool or your stool becomes black.  (Remember that if you take Pepto-Bismol , this will turn your stool black).   You feel very weak.    What can I do to help myself?  The most important thing to do is to drink clear liquids.   It is best to have only small, frequent sips of liquids. Drinking too much at once may cause more diarrhea. You should also replace minerals, sodium and potassium lost with diarrhea. Pedialyte  and sports drinks can help you replace these minerals. You can also drink clear liquids such as water, weak tea, apple juice, and 7-Up . Avoid acidic liquids (orange juice), caffeine (coffee) or alcohol. Milk products will make the diarrhea worse.  Eat bland (plain) foods. Soda crackers, toast, plain noodles, gelatin, applesauce and bananas are good first choices. Avoid foods that have acid, are spicy, fatty or fibrous (such as meats, coarse grains, vegetables). You may start eating these foods again in about 3 days when you are better.   Sometimes treatment includes prescription medicine to prevent diarrhea. If your provider prescribes these for you, take them as directed.   Nonprescription medicine is available for the treatment of diarrhea and can be very effective. If you use it, make sure you use the dose recommended on the package. Check with your healthcare provider before you use any medicine for diarrhea.   Do not take ibuprofen, or other nonsteroidal anti-inflammatory medicines, without checking with your healthcare provider.   Probiotics: If you have been given an antibiotic, you may want to also take a probiotic pill or eat yogurt with live cultures. Probiotics have \"good bacteria\" to help your intestines stay healthy. " Studies have shown that probiotics help prevent diarrhea and other intestine problems (including C. diff infection) when you take antibiotics. You can buy these without a prescription in the pharmacy section of the store.   If you were given a prescription for medicine here today, be sure to read all of the information (including the package insert) that comes with your prescription.  This will include important information about the medicine, its side effects, and any warnings that you need to know about.  The pharmacist who fills the prescription can provide more information and answer questions you may have about the medicine.  If you have questions or concerns that the pharmacist cannot address, please call or return to the Emergency Department.  Remember that you can always come back to the Emergency Department if you are not able to see your regular provider in the amount of time listed above, if you get any new symptoms, or if there is anything that worries you.

## 2023-10-20 NOTE — TELEPHONE ENCOUNTER
"Priority call from patient. Stating she has Itchy skin, ABD pain, and pale diarrhea. States she woke up and she was fine, slept, and then it happened when she woke up. \" Didn't mention any side effects other than discomfort, but has her paperwork on what tests she had done, bloodwork, BP, and temp, and diagnosis but states \"She didn't know there was paper about side effects\". States she \"has [facility] paperwork from her \". Patient stating she has \"felt nauseated after taking paxlovid\". Took last pill yesterday morning and has felt fine thus far. Covid test was negative today. Stated she had diarrhea on Wednesday, pale color.  Appetite hasn't been well, \"probably lost 6lbs since Saturday\".    Disposition states to be seen in office today.    Patient verbalized the following: \"I don't go to the doctors office. I only ever go to the emergency room! I have no confidence in primaries, I don't have one and haven't seen one in 10 years and all they do is stare at their computer, and say \"Ohh I have patients waiting, here, let me order some labs for you and increase your depression medications\". I have no confidence in them. I go to the office and they say they'll increase my depression medications and that's it. Doctor's don't even touch you anymore. Nobody has touched me in 10 years, I don't understand how they call them doctors, they are nothing but horse shit. I don't understand it.\" Patient sounded emotional on the phone call.    Writer stated she should go to urgent care at minimum to be assessed further. Patient had a raised tone as she expressed \"You go to [facility]urg[facility] care, and then they say \"Oh well you need to go to the emergency room\".    Writer reinforced that she needed to be seen today to be assessed wether it's in urgent care, or even the ED if she prefers for her abdominal pain. Patient voiced frustration as she stated she thought she was talking to [facility] emergency room. Notified patient she was " "speaking with a nurse at another clinic. Her volume raising, she stated she called the specific number to talk to someone in the emergency room.  Notified patient that we don't have access to transfer her to an emergency room. Writer found emergency room's number of her preferred location and gave her the phone number. Patient stated \"Bye\" and phone call ended.    Nito Valdivia RN  [facility]      Reason for Disposition   Age > 60 years    Additional Information   Negative: Passed out (i.e., fainted, collapsed and was not responding)   Negative: Shock suspected (e.g., cold/pale/clammy skin, too weak to stand, low BP, rapid pulse)   Negative: Sounds like a life-threatening emergency to the triager   Negative: Followed an abdomen (stomach) injury   Negative: Chest pain   Negative: Abdominal pain and pregnant < 20 weeks   Negative: Abdominal pain and pregnant 20 or more weeks   Negative: Pain is mainly in upper abdomen (if needed ask: 'is it mainly above the belly button?')   Negative: Abdomen bloating or swelling are main symptoms   Negative: SEVERE abdominal pain (e.g., excruciating)   Negative: Vomiting red blood or black (coffee ground) material   Negative: Blood in bowel movements  (Exception: Blood on surface of BM with constipation.)   Negative: Black or tarry bowel movements  (Exception: Chronic-unchanged black-grey BMs AND is taking iron pills or Pepto-Bismol.)   Negative: MILD TO MODERATE constant pain lasting > 2 hours   Negative: Vomiting bile (green color)   Negative: Patient sounds very sick or weak to the triager   Negative: Vomiting and abdomen looks much more swollen than usual   Negative: White of the eyes have turned yellow (i.e., jaundice)   Negative: Blood in urine (red, pink, or tea-colored)   Negative: Fever > 103 F (39.4 C)   Negative: Fever > 101 F (38.3 C) and over 60 years of age   Negative: Fever > 100.0 F (37.8 C) and has diabetes mellitus or a weak immune system " "(e.g., HIV positive, cancer chemotherapy, organ transplant, splenectomy, chronic steroids)   Negative: Fever > 100.0 F (37.8 C) and bedridden (e.g., CVA, chronic illness, recovering from surgery)   Negative: Pregnant or could be pregnant (i.e., missed last menstrual period)   Negative: MODERATE pain (e.g., interferes with normal activities that comes and goes (cramps) lasts > 24 hours  (Exception: Pain with Vomiting or Diarrhea - see that Protocol.)   Negative: Unusual vaginal discharge    Answer Assessment - Initial Assessment Questions  1. LOCATION: \"Where does it hurt?\"       Abdominal area, but going away.  2. RADIATION: \"Does the pain shoot anywhere else?\" (e.g., chest, back)        3. ONSET: \"When did the pain begin?\" (e.g., minutes, hours or days ago)       This morning.  4. SUDDEN: \"Gradual or sudden onset?\"      Sudden  5. PATTERN \"Does the pain come and go, or is it constant?\"     - If it comes and goes: \"How long does it last?\" \"Do you have pain now?\"      (Note: Comes and goes means the pain is intermittent. It goes away completely between bouts.)     - If constant: \"Is it getting better, staying the same, or getting worse?\"       (Note: Constant means the pain never goes away completely; most serious pain is constant and gets worse.)         6. SEVERITY: \"How bad is the pain?\"  (e.g., Scale 1-10; mild, moderate, or severe)     - MILD (1-3): Doesn't interfere with normal activities, abdomen soft and not tender to touch.      - MODERATE (4-7): Interferes with normal activities or awakens from sleep, abdomen tender to touch.      - SEVERE (8-10): Excruciating pain, doubled over, unable to do any normal activities.        Mild, could still do activities.  7. RECURRENT SYMPTOM: \"Have you ever had this type of stomach pain before?\" If Yes, ask: \"When was the last time?\" and \"What happened that time?\"       \"She's sure she has, ulcers, but they are gone:\"  8. CAUSE: \"What do you think is causing the stomach " "pain?\"      Paxlovid   9. RELIEVING/AGGRAVATING FACTORS: \"What makes it better or worse?\" (e.g., antacids, bending or twisting motion, bowel movement)        10. OTHER SYMPTOMS: \"Do you have any other symptoms?\" (e.g., back pain, diarrhea, fever, urination pain, vomiting)        Headache, back pain but has everyday    Protocols used: Abdominal Pain - Female-A-[place]    "

## 2023-10-20 NOTE — ED TRIAGE NOTES
Pt arrives with c/o possible reaction to paxlovid. Pt reports she didn't read about the side effects until her  got his prescription. Pt now endorses pale colored stool x1 a couple days ago, one short episode of abdominal pain this AM and feeling tired. Pt also reports feeling itchy last night.     Triage Assessment (Adult)       Row Name 10/20/23 1300          Triage Assessment    Airway WDL WDL        Respiratory WDL    Respiratory WDL WDL        Skin Circulation/Temperature WDL    Skin Circulation/Temperature WDL WDL        Cardiac WDL    Cardiac WDL WDL        Peripheral/Neurovascular WDL    Peripheral Neurovascular WDL WDL        Cognitive/Neuro/Behavioral WDL    Cognitive/Neuro/Behavioral WDL WDL

## 2023-10-28 ENCOUNTER — HOSPITAL ENCOUNTER (EMERGENCY)
Facility: CLINIC | Age: 78
Discharge: HOME | End: 2023-10-28
Attending: EMERGENCY MEDICINE | Admitting: EMERGENCY MEDICINE
Payer: MEDICARE

## 2023-10-28 ENCOUNTER — APPOINTMENT (OUTPATIENT)
Dept: CT IMAGING | Facility: CLINIC | Age: 78
End: 2023-10-28
Attending: EMERGENCY MEDICINE
Payer: MEDICARE

## 2023-10-28 VITALS
OXYGEN SATURATION: 100 % | SYSTOLIC BLOOD PRESSURE: 129 MMHG | DIASTOLIC BLOOD PRESSURE: 101 MMHG | HEART RATE: 110 BPM | RESPIRATION RATE: 24 BRPM | TEMPERATURE: 97.4 F

## 2023-10-28 DIAGNOSIS — R10.13 EPIGASTRIC PAIN: ICD-10-CM

## 2023-10-28 DIAGNOSIS — R10.32 LLQ ABDOMINAL PAIN: ICD-10-CM

## 2023-10-28 LAB
ALBUMIN SERPL BCG-MCNC: 4.6 G/DL (ref 3.5–5.2)
ALP SERPL-CCNC: 86 U/L (ref 35–104)
ALT SERPL W P-5'-P-CCNC: 10 U/L (ref 0–50)
ANION GAP SERPL CALCULATED.3IONS-SCNC: 11 MMOL/L (ref 7–15)
AST SERPL W P-5'-P-CCNC: 18 U/L (ref 0–45)
BASOPHILS # BLD AUTO: 0 10E3/UL (ref 0–0.2)
BASOPHILS NFR BLD AUTO: 0 %
BILIRUB SERPL-MCNC: 0.5 MG/DL
BUN SERPL-MCNC: 13.4 MG/DL (ref 8–23)
CALCIUM SERPL-MCNC: 9.9 MG/DL (ref 8.8–10.2)
CHLORIDE SERPL-SCNC: 103 MMOL/L (ref 98–107)
CREAT SERPL-MCNC: 0.83 MG/DL (ref 0.51–0.95)
DEPRECATED HCO3 PLAS-SCNC: 23 MMOL/L (ref 22–29)
EGFRCR SERPLBLD CKD-EPI 2021: 73 ML/MIN/1.73M2
EOSINOPHIL # BLD AUTO: 0.1 10E3/UL (ref 0–0.7)
EOSINOPHIL NFR BLD AUTO: 1 %
ERYTHROCYTE [DISTWIDTH] IN BLOOD BY AUTOMATED COUNT: 12.2 % (ref 10–15)
GLUCOSE SERPL-MCNC: 139 MG/DL (ref 70–99)
HCT VFR BLD AUTO: 44 % (ref 35–47)
HGB BLD-MCNC: 15 G/DL (ref 11.7–15.7)
HOLD SPECIMEN: NORMAL
HOLD SPECIMEN: NORMAL
IMM GRANULOCYTES # BLD: 0 10E3/UL
IMM GRANULOCYTES NFR BLD: 0 %
LIPASE SERPL-CCNC: 53 U/L (ref 13–60)
LYMPHOCYTES # BLD AUTO: 1.8 10E3/UL (ref 0.8–5.3)
LYMPHOCYTES NFR BLD AUTO: 23 %
MCH RBC QN AUTO: 32.4 PG (ref 26.5–33)
MCHC RBC AUTO-ENTMCNC: 34.1 G/DL (ref 31.5–36.5)
MCV RBC AUTO: 95 FL (ref 78–100)
MONOCYTES # BLD AUTO: 0.5 10E3/UL (ref 0–1.3)
MONOCYTES NFR BLD AUTO: 6 %
NEUTROPHILS # BLD AUTO: 5.3 10E3/UL (ref 1.6–8.3)
NEUTROPHILS NFR BLD AUTO: 70 %
NRBC # BLD AUTO: 0 10E3/UL
NRBC BLD AUTO-RTO: 0 /100
PLATELET # BLD AUTO: 158 10E3/UL (ref 150–450)
POTASSIUM SERPL-SCNC: 3.9 MMOL/L (ref 3.4–5.3)
PROT SERPL-MCNC: 7.1 G/DL (ref 6.4–8.3)
RBC # BLD AUTO: 4.63 10E6/UL (ref 3.8–5.2)
SODIUM SERPL-SCNC: 137 MMOL/L (ref 135–145)
WBC # BLD AUTO: 7.7 10E3/UL (ref 4–11)

## 2023-10-28 PROCEDURE — 74177 CT ABD & PELVIS W/CONTRAST: CPT | Mod: MG

## 2023-10-28 PROCEDURE — 99285 EMERGENCY DEPT VISIT HI MDM: CPT | Mod: 25

## 2023-10-28 PROCEDURE — 250N000009 HC RX 250: Performed by: EMERGENCY MEDICINE

## 2023-10-28 PROCEDURE — 83690 ASSAY OF LIPASE: CPT | Performed by: EMERGENCY MEDICINE

## 2023-10-28 PROCEDURE — 80053 COMPREHEN METABOLIC PANEL: CPT | Performed by: EMERGENCY MEDICINE

## 2023-10-28 PROCEDURE — G1010 CDSM STANSON: HCPCS

## 2023-10-28 PROCEDURE — 85014 HEMATOCRIT: CPT | Performed by: EMERGENCY MEDICINE

## 2023-10-28 PROCEDURE — 250N000011 HC RX IP 250 OP 636: Performed by: EMERGENCY MEDICINE

## 2023-10-28 PROCEDURE — 36415 COLL VENOUS BLD VENIPUNCTURE: CPT | Performed by: EMERGENCY MEDICINE

## 2023-10-28 RX ORDER — IOPAMIDOL 755 MG/ML
500 INJECTION, SOLUTION INTRAVASCULAR ONCE
Status: COMPLETED | OUTPATIENT
Start: 2023-10-28 | End: 2023-10-28

## 2023-10-28 RX ADMIN — IOPAMIDOL 98 ML: 755 INJECTION, SOLUTION INTRAVENOUS at 20:22

## 2023-10-28 RX ADMIN — SODIUM CHLORIDE 64 ML: 9 INJECTION, SOLUTION INTRAVENOUS at 20:22

## 2023-10-28 ASSESSMENT — ACTIVITIES OF DAILY LIVING (ADL)
ADLS_ACUITY_SCORE: 35
ADLS_ACUITY_SCORE: 33

## 2023-10-28 NOTE — ED PROVIDER NOTES
History     Chief Complaint:  Not feeling well and Abdominal Pain       HPI   Suma Iverson is a 78 year old female presenting with abdominal pain nausea.  She was diagnosed with COVID-19 2 weeks ago.  She denies any fever, chest pain, shortness of breath.  She endorses epigastric pain and left lower quadrant pain.  She states she has a history of ulcers, but had an endoscopy done in June, which showed that the ulcers had resolved.  She denies any vomiting, constipation, diarrhea, urinary problems.  It is difficult to discern when her abdominal pain started.  At first, she stated just started, but later she goes on to explain that she had left lower quadrant abdominal pain several years ago.  It appears her pain had resolved, but now has returned.      Independent Historian:    Patient only    Review of External Notes:  ED visit on 10/14/2023: Patient diagnosed with COVID-19 and prescribed Paxlovid  ED visit on 10/20/2023: Patient presented with transient abdominal pain    Medications:    albuterol (PROAIR HFA/PROVENTIL HFA/VENTOLIN HFA) 108 (90 Base) MCG/ACT inhaler  sucralfate (CARAFATE) 1 GM tablet        Past Medical History:    No past medical history on file.    Past Surgical History:    No past surgical history on file.       Physical Exam   Patient Vitals for the past 24 hrs:   BP Temp Temp src Pulse Resp SpO2   10/28/23 1730 (!) 129/101 97.4  F (36.3  C) Temporal 110 24 100 %        Physical Exam  General: Resting on the bed.  Head: No obvious trauma to head.  Ears, Nose, Throat:  External ears normal.  Nose normal.  No pharyngeal erythema, swelling or exudate.  Midline uvula. Moist mucus membranes.  Eyes:  Conjunctivae clear.   Neck: Normal range of motion.  Neck supple.   CV: Regular rate and rhythm.  No murmurs.      Respiratory: Effort normal and breath sounds normal.  No wheezing or crackles.   Gastrointestinal: Soft.  No distension.  Mild tenderness in the epigastrium and left lower quadrant  to deep palpation.  There is no rigidity, no rebound and no guarding.   Musculoskeletal: Normal range of motion.  Non tender extremities to palpations.    Neuro: Alert. Moving all extremities appropriately.  Normal speech.    Skin: Skin is warm and dry.  No rash noted.   Psych: Normal mood and affect. Behavior is normal.       Emergency Department Course       Imaging:  CT Abdomen Pelvis w Contrast   Final Result   IMPRESSION:    1.  No acute abnormality in the abdomen or pelvis.   2.  Additional details in the findings.        Report per radiology    Laboratory:  Labs Ordered and Resulted from Time of ED Arrival to Time of ED Departure   COMPREHENSIVE METABOLIC PANEL - Abnormal       Result Value    Sodium 137      Potassium 3.9      Carbon Dioxide (CO2) 23      Anion Gap 11      Urea Nitrogen 13.4      Creatinine 0.83      GFR Estimate 73      Calcium 9.9      Chloride 103      Glucose 139 (*)     Alkaline Phosphatase 86      AST 18      ALT 10      Protein Total 7.1      Albumin 4.6      Bilirubin Total 0.5     LIPASE - Normal    Lipase 53     CBC WITH PLATELETS AND DIFFERENTIAL    WBC Count 7.7      RBC Count 4.63      Hemoglobin 15.0      Hematocrit 44.0      MCV 95      MCH 32.4      MCHC 34.1      RDW 12.2      Platelet Count 158      % Neutrophils 70      % Lymphocytes 23      % Monocytes 6      % Eosinophils 1      % Basophils 0      % Immature Granulocytes 0      NRBCs per 100 WBC 0      Absolute Neutrophils 5.3      Absolute Lymphocytes 1.8      Absolute Monocytes 0.5      Absolute Eosinophils 0.1      Absolute Basophils 0.0      Absolute Immature Granulocytes 0.0      Absolute NRBCs 0.0          Procedures   NA    Emergency Department Course & Assessments:             Interventions:  Medications   iopamidol (ISOVUE-370) solution 500 mL (98 mLs Intravenous $Given 10/28/23 2022)   Sodium Chloride for CT Scan Flush Use (64 mLs Intravenous $Given 10/28/23 2022)        Assessments:  1740 -initial evaluation  and assessment of patient  2108 -I reevaluated the patient    Independent Interpretation (X-rays, CTs, rhythm strip):  None    Consultations/Discussion of Management or Tests:  None       Social Determinants of Health affecting care:  None     Disposition:  The patient was discharged to home.     Impression & Plan    CMS Diagnoses: None      Medical Decision Making:  Patient appears well on exam.  She is in no acute distress.  She has mild abdominal tenderness in the epigastrium and left lower quadrant.  She is unsure as to when her symptoms started.  She states she recently had an endoscopy, which was negative for any ulcers.  Lipase is not elevated.  It is possible that she is having acid reflux.  Electrolytes are within normal limits.  LFTs are within normal limits.  No leukocytosis or anemia.  CT abdomen and pelvis reveal the already known colonic diverticulosis and uterine fibroids, but is negative for any acute intra-abdominal pathology.  Upon further questioning, she was supposed to have a colonoscopy 1 year ago, but has since not received this.  She is going down to [place] for the winter.  She is encouraged to follow-up with her PCP in [place], and to receive a colonoscopy.  Return precautions are given and she verbalizes understanding.  She is discharged home in stable condition with her .      Diagnosis:    ICD-10-CM    1. Epigastric pain  R10.13       2. LLQ abdominal pain  R10.32            Discharge Medications:  Discharge Medication List as of 10/28/2023  9:59 PM             Esteban Hernandez MD  10/28/2023   Esteban Hernandez MD Peery, Stephen, MD  10/29/23 0156

## 2023-10-28 NOTE — ED TRIAGE NOTES
Pt presents to ER feeling worse since tested positive for COVID 10/14/23- fatigue, nausea, stomach pain & LLQ abdominal pain. Also thinks carfate is making her feel bad.

## 2023-10-29 NOTE — DISCHARGE INSTRUCTIONS
Your lab work-up all is reassuring.  Your CT scan shows diverticulosis.  You are overdue for colonoscopy.  We recommend that you have a colonoscopy when you return back to [place].  We also recommend that he follow-up with your primary care provider in [place].  Please return to the emergency department if you have any new or concerning symptoms.

## 2023-10-29 NOTE — ED NOTES
GastrointestinalGastrointestinal WDL: all (pt comes in stating that she is feeling worse after getting covid on 10/14/23, pt wondering why she is not feeling better. pt having ABD. PN, nausea, fatique and dizziness.)Bowel Sounds: All QuadrantsAll Quadrants Bowel Sounds: hypoactiveNausea/Vomiting Signs/Symptoms: nausea intermittentAdditional Documentation: Bowel Sounds (Row); Nausea/Vomiting Signs/Symptoms (Row)

## 2023-11-03 ENCOUNTER — APPOINTMENT (OUTPATIENT)
Dept: CT IMAGING | Facility: CLINIC | Age: 78
DRG: 164 | End: 2023-11-03
Attending: SOCIAL WORKER
Payer: MEDICARE

## 2023-11-03 ENCOUNTER — HOSPITAL ENCOUNTER (INPATIENT)
Facility: CLINIC | Age: 78
LOS: 4 days | Discharge: HOME | DRG: 164 | End: 2023-11-07
Attending: INTERNAL MEDICINE | Admitting: HOSPITALIST
Payer: MEDICARE

## 2023-11-03 ENCOUNTER — HOSPITAL ENCOUNTER (EMERGENCY)
Facility: CLINIC | Age: 78
Discharge: ANOTHER HOSPITAL | DRG: 164 | End: 2023-11-03
Attending: SOCIAL WORKER | Admitting: SOCIAL WORKER
Payer: MEDICARE

## 2023-11-03 ENCOUNTER — APPOINTMENT (OUTPATIENT)
Dept: ULTRASOUND IMAGING | Facility: CLINIC | Age: 78
DRG: 164 | End: 2023-11-03
Attending: SOCIAL WORKER
Payer: MEDICARE

## 2023-11-03 VITALS
HEART RATE: 89 BPM | RESPIRATION RATE: 32 BRPM | OXYGEN SATURATION: 98 % | TEMPERATURE: 98.1 F | SYSTOLIC BLOOD PRESSURE: 106 MMHG | DIASTOLIC BLOOD PRESSURE: 74 MMHG

## 2023-11-03 DIAGNOSIS — I82.412 ACUTE DEEP VEIN THROMBOSIS (DVT) OF FEMORAL VEIN OF LEFT LOWER EXTREMITY (H): ICD-10-CM

## 2023-11-03 DIAGNOSIS — I26.02 ACUTE SADDLE PULMONARY EMBOLISM WITH ACUTE COR PULMONALE (H): ICD-10-CM

## 2023-11-03 DIAGNOSIS — I26.02 SADDLE EMBOLUS OF PULMONARY ARTERY WITH ACUTE COR PULMONALE, UNSPECIFIED CHRONICITY (H): Primary | ICD-10-CM

## 2023-11-03 DIAGNOSIS — F41.9 ANXIETY: ICD-10-CM

## 2023-11-03 PROBLEM — I26.99 PULMONARY EMBOLISM (H): Status: ACTIVE | Noted: 2023-11-03

## 2023-11-03 LAB
ALBUMIN SERPL BCG-MCNC: 4.3 G/DL (ref 3.5–5.2)
ALP SERPL-CCNC: 86 U/L (ref 35–104)
ALT SERPL W P-5'-P-CCNC: 11 U/L (ref 0–50)
ANION GAP SERPL CALCULATED.3IONS-SCNC: 11 MMOL/L (ref 7–15)
ANION GAP SERPL CALCULATED.3IONS-SCNC: 11 MMOL/L (ref 7–15)
AST SERPL W P-5'-P-CCNC: 18 U/L (ref 0–45)
ATRIAL RATE - MUSE: 89 BPM
BASOPHILS # BLD AUTO: 0 10E3/UL (ref 0–0.2)
BASOPHILS NFR BLD AUTO: 0 %
BILIRUB DIRECT SERPL-MCNC: <0.2 MG/DL (ref 0–0.3)
BILIRUB SERPL-MCNC: 0.4 MG/DL
BUN SERPL-MCNC: 17.4 MG/DL (ref 8–23)
BUN SERPL-MCNC: 19.8 MG/DL (ref 8–23)
CALCIUM SERPL-MCNC: 8.9 MG/DL (ref 8.8–10.2)
CALCIUM SERPL-MCNC: 9.3 MG/DL (ref 8.8–10.2)
CHLORIDE SERPL-SCNC: 109 MMOL/L (ref 98–107)
CHLORIDE SERPL-SCNC: 109 MMOL/L (ref 98–107)
CREAT SERPL-MCNC: 0.82 MG/DL (ref 0.51–0.95)
CREAT SERPL-MCNC: 0.83 MG/DL (ref 0.51–0.95)
DEPRECATED HCO3 PLAS-SCNC: 21 MMOL/L (ref 22–29)
DEPRECATED HCO3 PLAS-SCNC: 22 MMOL/L (ref 22–29)
DIASTOLIC BLOOD PRESSURE - MUSE: NORMAL MMHG
EGFRCR SERPLBLD CKD-EPI 2021: 73 ML/MIN/1.73M2
EGFRCR SERPLBLD CKD-EPI 2021: 74 ML/MIN/1.73M2
EOSINOPHIL # BLD AUTO: 0.1 10E3/UL (ref 0–0.7)
EOSINOPHIL NFR BLD AUTO: 1 %
ERYTHROCYTE [DISTWIDTH] IN BLOOD BY AUTOMATED COUNT: 12.4 % (ref 10–15)
ERYTHROCYTE [DISTWIDTH] IN BLOOD BY AUTOMATED COUNT: 12.6 % (ref 10–15)
ERYTHROCYTE [DISTWIDTH] IN BLOOD BY AUTOMATED COUNT: 12.7 % (ref 10–15)
GLUCOSE BLDC GLUCOMTR-MCNC: 122 MG/DL (ref 70–99)
GLUCOSE SERPL-MCNC: 113 MG/DL (ref 70–99)
GLUCOSE SERPL-MCNC: 154 MG/DL (ref 70–99)
HCT VFR BLD AUTO: 41.7 % (ref 35–47)
HCT VFR BLD AUTO: 43.7 % (ref 35–47)
HCT VFR BLD AUTO: 45.2 % (ref 35–47)
HGB BLD-MCNC: 14.1 G/DL (ref 11.7–15.7)
HGB BLD-MCNC: 14.5 G/DL (ref 11.7–15.7)
HGB BLD-MCNC: 14.9 G/DL (ref 11.7–15.7)
HOLD SPECIMEN: NORMAL
HOLD SPECIMEN: NORMAL
IMM GRANULOCYTES # BLD: 0 10E3/UL
IMM GRANULOCYTES NFR BLD: 0 %
INTERPRETATION ECG - MUSE: NORMAL
LYMPHOCYTES # BLD AUTO: 1.4 10E3/UL (ref 0.8–5.3)
LYMPHOCYTES NFR BLD AUTO: 16 %
MCH RBC QN AUTO: 32.2 PG (ref 26.5–33)
MCH RBC QN AUTO: 32.3 PG (ref 26.5–33)
MCH RBC QN AUTO: 32.6 PG (ref 26.5–33)
MCHC RBC AUTO-ENTMCNC: 33 G/DL (ref 31.5–36.5)
MCHC RBC AUTO-ENTMCNC: 33.2 G/DL (ref 31.5–36.5)
MCHC RBC AUTO-ENTMCNC: 33.8 G/DL (ref 31.5–36.5)
MCV RBC AUTO: 96 FL (ref 78–100)
MCV RBC AUTO: 97 FL (ref 78–100)
MCV RBC AUTO: 98 FL (ref 78–100)
MONOCYTES # BLD AUTO: 0.6 10E3/UL (ref 0–1.3)
MONOCYTES NFR BLD AUTO: 6 %
NEUTROPHILS # BLD AUTO: 6.6 10E3/UL (ref 1.6–8.3)
NEUTROPHILS NFR BLD AUTO: 77 %
NRBC # BLD AUTO: 0 10E3/UL
NRBC BLD AUTO-RTO: 0 /100
NT-PROBNP SERPL-MCNC: 2849 PG/ML (ref 0–1800)
P AXIS - MUSE: 39 DEGREES
PLATELET # BLD AUTO: 124 10E3/UL (ref 150–450)
PLATELET # BLD AUTO: 135 10E3/UL (ref 150–450)
PLATELET # BLD AUTO: 139 10E3/UL (ref 150–450)
POTASSIUM SERPL-SCNC: 3.7 MMOL/L (ref 3.4–5.3)
POTASSIUM SERPL-SCNC: 4 MMOL/L (ref 3.4–5.3)
PR INTERVAL - MUSE: 184 MS
PROT SERPL-MCNC: 6.8 G/DL (ref 6.4–8.3)
QRS DURATION - MUSE: 98 MS
QT - MUSE: 430 MS
QTC - MUSE: 523 MS
R AXIS - MUSE: -61 DEGREES
RBC # BLD AUTO: 4.33 10E6/UL (ref 3.8–5.2)
RBC # BLD AUTO: 4.49 10E6/UL (ref 3.8–5.2)
RBC # BLD AUTO: 4.63 10E6/UL (ref 3.8–5.2)
SODIUM SERPL-SCNC: 141 MMOL/L (ref 135–145)
SODIUM SERPL-SCNC: 142 MMOL/L (ref 135–145)
SYSTOLIC BLOOD PRESSURE - MUSE: NORMAL MMHG
T AXIS - MUSE: 42 DEGREES
TROPONIN T SERPL HS-MCNC: 44 NG/L
UFH PPP CHRO-ACNC: >1.1 IU/ML
VENTRICULAR RATE- MUSE: 89 BPM
WBC # BLD AUTO: 7.5 10E3/UL (ref 4–11)
WBC # BLD AUTO: 8.6 10E3/UL (ref 4–11)
WBC # BLD AUTO: 8.7 10E3/UL (ref 4–11)

## 2023-11-03 PROCEDURE — 96374 THER/PROPH/DIAG INJ IV PUSH: CPT | Mod: 59

## 2023-11-03 PROCEDURE — 36415 COLL VENOUS BLD VENIPUNCTURE: CPT | Performed by: HOSPITALIST

## 2023-11-03 PROCEDURE — 93970 EXTREMITY STUDY: CPT

## 2023-11-03 PROCEDURE — 83880 ASSAY OF NATRIURETIC PEPTIDE: CPT | Performed by: SOCIAL WORKER

## 2023-11-03 PROCEDURE — 84484 ASSAY OF TROPONIN QUANT: CPT | Performed by: SOCIAL WORKER

## 2023-11-03 PROCEDURE — 82248 BILIRUBIN DIRECT: CPT | Performed by: SOCIAL WORKER

## 2023-11-03 PROCEDURE — 85027 COMPLETE CBC AUTOMATED: CPT | Performed by: HOSPITALIST

## 2023-11-03 PROCEDURE — 99285 EMERGENCY DEPT VISIT HI MDM: CPT | Mod: 25

## 2023-11-03 PROCEDURE — 250N000011 HC RX IP 250 OP 636: Performed by: SOCIAL WORKER

## 2023-11-03 PROCEDURE — 36415 COLL VENOUS BLD VENIPUNCTURE: CPT | Performed by: SOCIAL WORKER

## 2023-11-03 PROCEDURE — G1010 CDSM STANSON: HCPCS

## 2023-11-03 PROCEDURE — 99223 1ST HOSP IP/OBS HIGH 75: CPT | Mod: AI | Performed by: HOSPITALIST

## 2023-11-03 PROCEDURE — 250N000009 HC RX 250: Performed by: SOCIAL WORKER

## 2023-11-03 PROCEDURE — 85025 COMPLETE CBC W/AUTO DIFF WBC: CPT | Performed by: SOCIAL WORKER

## 2023-11-03 PROCEDURE — 250N000011 HC RX IP 250 OP 636: Mod: JZ | Performed by: SOCIAL WORKER

## 2023-11-03 PROCEDURE — 85520 HEPARIN ASSAY: CPT | Performed by: HOSPITALIST

## 2023-11-03 PROCEDURE — 36014 PLACE CATHETER IN ARTERY: CPT | Mod: RT

## 2023-11-03 PROCEDURE — 93005 ELECTROCARDIOGRAM TRACING: CPT

## 2023-11-03 PROCEDURE — 80053 COMPREHEN METABOLIC PANEL: CPT | Performed by: SOCIAL WORKER

## 2023-11-03 PROCEDURE — 210N000002 HC R&B HEART CARE

## 2023-11-03 PROCEDURE — 85027 COMPLETE CBC AUTOMATED: CPT | Mod: 91 | Performed by: SOCIAL WORKER

## 2023-11-03 RX ORDER — NICOTINE POLACRILEX 4 MG
15-30 LOZENGE BUCCAL
Status: DISCONTINUED | OUTPATIENT
Start: 2023-11-03 | End: 2023-11-07 | Stop reason: HOSPADM

## 2023-11-03 RX ORDER — HEPARIN SODIUM 10000 [USP'U]/100ML
0-5000 INJECTION, SOLUTION INTRAVENOUS CONTINUOUS
Status: DISCONTINUED | OUTPATIENT
Start: 2023-11-03 | End: 2023-11-03 | Stop reason: HOSPADM

## 2023-11-03 RX ORDER — IOPAMIDOL 755 MG/ML
500 INJECTION, SOLUTION INTRAVASCULAR ONCE
Status: COMPLETED | OUTPATIENT
Start: 2023-11-03 | End: 2023-11-03

## 2023-11-03 RX ORDER — ONDANSETRON 2 MG/ML
4 INJECTION INTRAMUSCULAR; INTRAVENOUS EVERY 6 HOURS PRN
Status: DISCONTINUED | OUTPATIENT
Start: 2023-11-03 | End: 2023-11-07 | Stop reason: HOSPADM

## 2023-11-03 RX ORDER — POLYETHYLENE GLYCOL 3350 17 G/17G
17 POWDER, FOR SOLUTION ORAL DAILY PRN
Status: DISCONTINUED | OUTPATIENT
Start: 2023-11-03 | End: 2023-11-07 | Stop reason: HOSPADM

## 2023-11-03 RX ORDER — PROCHLORPERAZINE MALEATE 5 MG
5 TABLET ORAL EVERY 6 HOURS PRN
Status: DISCONTINUED | OUTPATIENT
Start: 2023-11-03 | End: 2023-11-07 | Stop reason: HOSPADM

## 2023-11-03 RX ORDER — ACETAMINOPHEN 650 MG/1
650 SUPPOSITORY RECTAL EVERY 6 HOURS PRN
Status: DISCONTINUED | OUTPATIENT
Start: 2023-11-03 | End: 2023-11-07 | Stop reason: HOSPADM

## 2023-11-03 RX ORDER — LIDOCAINE 40 MG/G
CREAM TOPICAL
Status: DISCONTINUED | OUTPATIENT
Start: 2023-11-03 | End: 2023-11-07 | Stop reason: HOSPADM

## 2023-11-03 RX ORDER — DEXTROSE MONOHYDRATE 25 G/50ML
25-50 INJECTION, SOLUTION INTRAVENOUS
Status: DISCONTINUED | OUTPATIENT
Start: 2023-11-03 | End: 2023-11-07 | Stop reason: HOSPADM

## 2023-11-03 RX ORDER — SODIUM CHLORIDE 9 MG/ML
INJECTION, SOLUTION INTRAVENOUS CONTINUOUS
Status: DISCONTINUED | OUTPATIENT
Start: 2023-11-03 | End: 2023-11-07 | Stop reason: HOSPADM

## 2023-11-03 RX ORDER — NITROGLYCERIN 0.4 MG/1
0.4 TABLET SUBLINGUAL EVERY 5 MIN PRN
Status: DISCONTINUED | OUTPATIENT
Start: 2023-11-03 | End: 2023-11-07 | Stop reason: HOSPADM

## 2023-11-03 RX ORDER — ACETAMINOPHEN 325 MG/1
650 TABLET ORAL EVERY 6 HOURS PRN
Status: DISCONTINUED | OUTPATIENT
Start: 2023-11-03 | End: 2023-11-07 | Stop reason: HOSPADM

## 2023-11-03 RX ORDER — HEPARIN SODIUM 10000 [USP'U]/100ML
0-5000 INJECTION, SOLUTION INTRAVENOUS CONTINUOUS
Status: DISCONTINUED | OUTPATIENT
Start: 2023-11-03 | End: 2023-11-06

## 2023-11-03 RX ORDER — ONDANSETRON 4 MG/1
4 TABLET, ORALLY DISINTEGRATING ORAL EVERY 6 HOURS PRN
Status: DISCONTINUED | OUTPATIENT
Start: 2023-11-03 | End: 2023-11-07 | Stop reason: HOSPADM

## 2023-11-03 RX ORDER — PROCHLORPERAZINE 25 MG
12.5 SUPPOSITORY, RECTAL RECTAL EVERY 12 HOURS PRN
Status: DISCONTINUED | OUTPATIENT
Start: 2023-11-03 | End: 2023-11-07 | Stop reason: HOSPADM

## 2023-11-03 RX ADMIN — IOPAMIDOL 77 ML: 755 INJECTION, SOLUTION INTRAVENOUS at 11:29

## 2023-11-03 RX ADMIN — SODIUM CHLORIDE 95 ML: 9 INJECTION, SOLUTION INTRAVENOUS at 11:29

## 2023-11-03 RX ADMIN — HEPARIN SODIUM 1600 UNITS/HR: 10000 INJECTION, SOLUTION INTRAVENOUS at 12:39

## 2023-11-03 ASSESSMENT — ACTIVITIES OF DAILY LIVING (ADL)
ADLS_ACUITY_SCORE: 35
ADLS_ACUITY_SCORE: 33

## 2023-11-03 NOTE — ED TRIAGE NOTES
"Pt arrives from home via EMS with c/o cough, nasal congestion, SOB and anxiety. +COVID 10/14/23.  Was given a RX for Albuterol, has taken it a couple times \"When I first got it.\" Pt reports losing her prescription for cough medication. Last took Valium last night. Denies fever. A&OX4.      Triage Assessment (Adult)       Row Name 11/03/23 0923          Triage Assessment    Airway WDL WDL        Respiratory WDL    Respiratory WDL X;cough;rhythm/pattern     Rhythm/Pattern, Respiratory shortness of breath        Skin Circulation/Temperature WDL    Skin Circulation/Temperature WDL WDL        Cardiac WDL    Cardiac WDL WDL        Peripheral/Neurovascular WDL    Peripheral Neurovascular WDL WDL        Cognitive/Neuro/Behavioral WDL    Cognitive/Neuro/Behavioral WDL WDL                     "

## 2023-11-03 NOTE — ED PROVIDER NOTES
"  History     Chief Complaint:  Cough and Anxiety       HPI   Suma Iverson is a 78 year old female with a history of COVID diagnosed 10/14, anxiety, type 2 diabetes, hypertension, seizure disorder, who presented to the emergency department with a chief complaint of ongoing cough that is preventing her from sleeping, congestion, and persistent sore throat. Does report some new shortness of breath with exertion.  She denies any chest pain.  She has not had any hemoptysis.  No fever, no vomiting.  Patient is intermittently tangential and quite tearful throughout.  Her greatest concern is that she has pneumonia that will develop as she and her  are planning to drive to [place] tomorrow. Took valium yesterday.     Independent Historian:   None - Patient Only    Review of External Notes:   I reviewed the office visit from 9/29/23: 'She is having a lot of anxiety about the dentures she got.  The dentist keeps telling her it will be fine.  But she isn't happy with them- that is affecting her anxiety.  She is upset- crying today in the exam room. \"      Medications:    albuterol (PROAIR HFA/PROVENTIL HFA/VENTOLIN HFA) 108 (90 Base) MCG/ACT inhaler  sucralfate (CARAFATE) 1 GM tablet        Past Medical History:    No past medical history on file.    Past Surgical History:    No past surgical history on file.     Physical Exam   Patient Vitals for the past 24 hrs:   BP Temp Temp src Pulse Resp SpO2   11/03/23 1340 111/63 -- -- 102 -- 97 %   11/03/23 1320 108/52 -- -- 97 -- 97 %   11/03/23 1319 108/52 -- -- 94 -- 98 %   11/03/23 1309 -- -- -- 98 (!) 32 98 %   11/03/23 1259 (!) 132/91 -- -- 103 11 (!) 87 %   11/03/23 1249 -- -- -- 103 15 98 %   11/03/23 1239 (!) 131/94 -- -- 101 14 98 %   11/03/23 1208 -- -- -- -- -- 96 %   11/03/23 1204 (!) 126/103 -- -- 104 -- --   11/03/23 0921 (!) 123/95 98.1  F (36.7  C) Oral 94 20 99 %        Physical Exam  General: Tearful, quite anxious  HEENT: Conjunctivae clear, no scleral " icterus, mucous membranes moist   No posterior oropharyngeal swelling or EVD or deviation  Neuro: Alert, moving all extremities equally with intention  CV: Regular rate and rhythm, radial and DP pulses equal  Respiratory: Tachypneic, no obvious wheezes or crackles  Abdomen: Soft, without rigidity or rebound throughout  MSK: No lower extremity swelling or tenderness      Emergency Department Course   ECG  ECG taken at 0942, ECG read at 0950  Sinus rhythm with incomplete RBBB, nonspecific T wave inversions in V2/V3    T wave inversions appear to be new as compared to prior, dated 10/14/2023.  Rate 89 bpm. MT interval 184 ms. QRS duration 98 ms. QT/QTc 430/523 ms. P-R-T axes 39 -61 42.     Imaging:  CT Chest Pulmonary Embolism w Contrast   Final Result   IMPRESSION:   1.  Large saddle embolism is noted with extension into the lobar and   segmental branches of all lobes. Severe right heart strain. Recommend   consultation with interventional radiology.      Findings were discussed with Dr. Ron Mitchell at 11:52 am on 11/03/2023.      DK CALLE MD            SYSTEM ID:  [id number]       Lower Extremity Venous Duplex Bilateral    (Results Pending)          Laboratory:  Labs Ordered and Resulted from Time of ED Arrival to Time of ED Departure   BASIC METABOLIC PANEL - Abnormal       Result Value    Sodium 141      Potassium 4.0      Chloride 109 (*)     Carbon Dioxide (CO2) 21 (*)     Anion Gap 11      Urea Nitrogen 19.8      Creatinine 0.83      GFR Estimate 73      Calcium 9.3      Glucose 154 (*)    CBC WITH PLATELETS AND DIFFERENTIAL - Abnormal    WBC Count 8.7      RBC Count 4.63      Hemoglobin 14.9      Hematocrit 45.2      MCV 98      MCH 32.2      MCHC 33.0      RDW 12.7      Platelet Count 139 (*)     % Neutrophils 77      % Lymphocytes 16      % Monocytes 6      % Eosinophils 1      % Basophils 0      % Immature Granulocytes 0      NRBCs per 100 WBC 0      Absolute Neutrophils 6.6      Absolute Lymphocytes 1.4       Absolute Monocytes 0.6      Absolute Eosinophils 0.1      Absolute Basophils 0.0      Absolute Immature Granulocytes 0.0      Absolute NRBCs 0.0     TROPONIN T, HIGH SENSITIVITY - Abnormal    Troponin T, High Sensitivity 44 (*)    NT PROBNP INPATIENT - Abnormal    N terminal Pro BNP Inpatient 2,849 (*)    HEPATIC FUNCTION PANEL - Normal    Protein Total 6.8      Albumin 4.3      Bilirubin Total 0.4      Alkaline Phosphatase 86      AST 18      ALT 11      Bilirubin Direct <0.20     CBC WITH PLATELETS        Procedures       Emergency Department Course & Assessments:             Interventions:  Medications   heparin 25,000 units in 0.45% NaCl 250 mL ANTICOAGULANT infusion (1,600 Units/hr Intravenous $New Bag 11/3/23 123)   iopamidol (ISOVUE-370) solution 500 mL (77 mLs Intravenous $Given 11/3/23 112)   CT scan flush (95 mLs Intravenous $Given 11/3/23 112)   heparin ANTICOAGULANT Loading dose for HIGH INTENSITY TREATMENT * Give BEFORE starting heparin infusion (7,100 Units Intravenous $Given 11/3/23 123)        Assessments:  ED Course as of 23 1836      1150 Spoke to radiology, recommends reaching out to IR.   1155 Spoke to Dr. Cosme   1215 Spoke with Dr. Zuñiga at [facility].    1226 Updated patient on results   1350 Reassessed patient,  is now at bedside.  Stable although still tachypneic.   1515   1.  Short segment occlusive deep vein thrombus in duplicated left  popliteal vein.  2.  No deep venous thrombosis in the right lower extremity.           Independent Interpretation (X-rays, CTs, rhythm strip):  None    Consultations/Discussion of Management or Tests:  IR, Dr Cosme  Hospitalist at [facility], Dr Zuñiga      Social Determinants of Health affecting care:   None    Disposition:  The patient was transferred to [facility] via ambulance. Dr. Zuñiga accepted the patient for transfer.     Impression & Plan    CMS Diagnoses: None      Medical Decision Makin-year-old  female with a history of recent COVID, anxiety, presents emergency department chief complaint of cough, sore throat, congestion and dyspnea. Reassuringly saturating well on RA. Given new report of shortness of breath with exertion, CT PE was obtained which showed saddle PE with right heart strain. Troponin and BNP elevated. Spoke with IR who recommended transfer to [facility] for thrombectomy capability. Patient remained stable throughout her stay in the ED, was started on heparin and prophylactic supplemental oxygen. I did update patient and her  on the findings in person. She was transferred off the floor in stable condition.       Diagnosis:    ICD-10-CM    1. Acute saddle pulmonary embolism with acute cor pulmonale (H)  I26.02       2. Anxiety  F41.9       3. Acute deep vein thrombosis (DVT) of femoral vein of left lower extremity (H)  I82.412            Discharge Medications:  New Prescriptions    No medications on file          Tyra Mitchell MD  11/3/2023   Tyra Alcantara MD Wu Klasek, Connie, MD  11/03/23 3456

## 2023-11-04 ENCOUNTER — APPOINTMENT (OUTPATIENT)
Dept: INTERVENTIONAL RADIOLOGY/VASCULAR | Facility: CLINIC | Age: 78
DRG: 164 | End: 2023-11-04
Attending: RADIOLOGY
Payer: MEDICARE

## 2023-11-04 LAB
ANION GAP SERPL CALCULATED.3IONS-SCNC: 13 MMOL/L (ref 7–15)
BUN SERPL-MCNC: 18 MG/DL (ref 8–23)
CALCIUM SERPL-MCNC: 8.9 MG/DL (ref 8.8–10.2)
CHLORIDE SERPL-SCNC: 106 MMOL/L (ref 98–107)
CREAT SERPL-MCNC: 0.79 MG/DL (ref 0.51–0.95)
DEPRECATED HCO3 PLAS-SCNC: 20 MMOL/L (ref 22–29)
EGFRCR SERPLBLD CKD-EPI 2021: 77 ML/MIN/1.73M2
ERYTHROCYTE [DISTWIDTH] IN BLOOD BY AUTOMATED COUNT: 12.5 % (ref 10–15)
GLUCOSE BLDC GLUCOMTR-MCNC: 100 MG/DL (ref 70–99)
GLUCOSE BLDC GLUCOMTR-MCNC: 122 MG/DL (ref 70–99)
GLUCOSE BLDC GLUCOMTR-MCNC: 132 MG/DL (ref 70–99)
GLUCOSE BLDC GLUCOMTR-MCNC: 186 MG/DL (ref 70–99)
GLUCOSE SERPL-MCNC: 140 MG/DL (ref 70–99)
HBA1C MFR BLD: 6.1 %
HCT VFR BLD AUTO: 40.3 % (ref 35–47)
HGB BLD-MCNC: 13.4 G/DL (ref 11.7–15.7)
MCH RBC QN AUTO: 31.9 PG (ref 26.5–33)
MCHC RBC AUTO-ENTMCNC: 33.3 G/DL (ref 31.5–36.5)
MCV RBC AUTO: 96 FL (ref 78–100)
PLATELET # BLD AUTO: 122 10E3/UL (ref 150–450)
POTASSIUM SERPL-SCNC: 4.1 MMOL/L (ref 3.4–5.3)
RBC # BLD AUTO: 4.2 10E6/UL (ref 3.8–5.2)
SODIUM SERPL-SCNC: 139 MMOL/L (ref 135–145)
UFH PPP CHRO-ACNC: 1.05 IU/ML
UFH PPP CHRO-ACNC: >1.1 IU/ML
UFH PPP CHRO-ACNC: >1.1 IU/ML
WBC # BLD AUTO: 7.2 10E3/UL (ref 4–11)

## 2023-11-04 PROCEDURE — 250N000009 HC RX 250: Performed by: RADIOLOGY

## 2023-11-04 PROCEDURE — 83036 HEMOGLOBIN GLYCOSYLATED A1C: CPT | Performed by: HOSPITALIST

## 2023-11-04 PROCEDURE — 37185 PRIM ART M-THRMBC SBSQ VSL: CPT | Mod: LT

## 2023-11-04 PROCEDURE — 36415 COLL VENOUS BLD VENIPUNCTURE: CPT | Performed by: HOSPITALIST

## 2023-11-04 PROCEDURE — 250N000013 HC RX MED GY IP 250 OP 250 PS 637: Performed by: HOSPITALIST

## 2023-11-04 PROCEDURE — B31S1ZZ FLUOROSCOPY OF RIGHT PULMONARY ARTERY USING LOW OSMOLAR CONTRAST: ICD-10-PCS | Performed by: RADIOLOGY

## 2023-11-04 PROCEDURE — 85520 HEPARIN ASSAY: CPT | Performed by: HOSPITALIST

## 2023-11-04 PROCEDURE — 272N000194 HC ACCESSORY CR3

## 2023-11-04 PROCEDURE — C1769 GUIDE WIRE: HCPCS

## 2023-11-04 PROCEDURE — 255N000002 HC RX 255 OP 636: Mod: JZ | Performed by: RADIOLOGY

## 2023-11-04 PROCEDURE — 272N000566 HC SHEATH CR3

## 2023-11-04 PROCEDURE — B31T1ZZ FLUOROSCOPY OF LEFT PULMONARY ARTERY USING LOW OSMOLAR CONTRAST: ICD-10-PCS | Performed by: RADIOLOGY

## 2023-11-04 PROCEDURE — 80048 BASIC METABOLIC PNL TOTAL CA: CPT | Performed by: HOSPITALIST

## 2023-11-04 PROCEDURE — 250N000011 HC RX IP 250 OP 636: Mod: JZ | Performed by: HOSPITALIST

## 2023-11-04 PROCEDURE — 99232 SBSQ HOSP IP/OBS MODERATE 35: CPT | Performed by: HOSPITALIST

## 2023-11-04 PROCEDURE — 250N000011 HC RX IP 250 OP 636: Performed by: RADIOLOGY

## 2023-11-04 PROCEDURE — 210N000002 HC R&B HEART CARE

## 2023-11-04 PROCEDURE — 272N000116 HC CATH CR1

## 2023-11-04 PROCEDURE — 36014 PLACE CATHETER IN ARTERY: CPT | Mod: RT

## 2023-11-04 PROCEDURE — 85027 COMPLETE CBC AUTOMATED: CPT | Performed by: HOSPITALIST

## 2023-11-04 PROCEDURE — C1760 CLOSURE DEV, VASC: HCPCS

## 2023-11-04 PROCEDURE — 02CR3ZZ EXTIRPATION OF MATTER FROM LEFT PULMONARY ARTERY, PERCUTANEOUS APPROACH: ICD-10-PCS | Performed by: RADIOLOGY

## 2023-11-04 PROCEDURE — 272N000196 HC ACCESSORY CR5

## 2023-11-04 PROCEDURE — 02CQ3ZZ EXTIRPATION OF MATTER FROM RIGHT PULMONARY ARTERY, PERCUTANEOUS APPROACH: ICD-10-PCS | Performed by: RADIOLOGY

## 2023-11-04 RX ORDER — IOPAMIDOL 612 MG/ML
100 INJECTION, SOLUTION INTRAVASCULAR ONCE
Status: COMPLETED | OUTPATIENT
Start: 2023-11-04 | End: 2023-11-04

## 2023-11-04 RX ORDER — SIMVASTATIN 40 MG
40 TABLET ORAL AT BEDTIME
Status: DISCONTINUED | OUTPATIENT
Start: 2023-11-04 | End: 2023-11-07 | Stop reason: HOSPADM

## 2023-11-04 RX ORDER — NALOXONE HYDROCHLORIDE 0.4 MG/ML
0.4 INJECTION, SOLUTION INTRAMUSCULAR; INTRAVENOUS; SUBCUTANEOUS
Status: DISCONTINUED | OUTPATIENT
Start: 2023-11-04 | End: 2023-11-04 | Stop reason: HOSPADM

## 2023-11-04 RX ORDER — FAMOTIDINE 40 MG/1
40 TABLET, FILM COATED ORAL AT BEDTIME
COMMUNITY

## 2023-11-04 RX ORDER — LISINOPRIL 10 MG/1
10 TABLET ORAL DAILY
Status: ON HOLD | COMMUNITY
End: 2023-11-07

## 2023-11-04 RX ORDER — NALOXONE HYDROCHLORIDE 0.4 MG/ML
0.2 INJECTION, SOLUTION INTRAMUSCULAR; INTRAVENOUS; SUBCUTANEOUS
Status: DISCONTINUED | OUTPATIENT
Start: 2023-11-04 | End: 2023-11-04 | Stop reason: HOSPADM

## 2023-11-04 RX ORDER — ERGOCALCIFEROL 1.25 MG/1
50000 CAPSULE, LIQUID FILLED ORAL WEEKLY
COMMUNITY

## 2023-11-04 RX ORDER — LISINOPRIL 10 MG/1
10 TABLET ORAL DAILY
Status: DISCONTINUED | OUTPATIENT
Start: 2023-11-04 | End: 2023-11-07 | Stop reason: HOSPADM

## 2023-11-04 RX ORDER — ZONISAMIDE 100 MG/1
CAPSULE ORAL 2 TIMES DAILY
COMMUNITY

## 2023-11-04 RX ORDER — HEPARIN SODIUM 200 [USP'U]/100ML
1 INJECTION, SOLUTION INTRAVENOUS CONTINUOUS PRN
Status: DISCONTINUED | OUTPATIENT
Start: 2023-11-04 | End: 2023-11-04 | Stop reason: HOSPADM

## 2023-11-04 RX ORDER — ALBUTEROL SULFATE 90 UG/1
2 AEROSOL, METERED RESPIRATORY (INHALATION) EVERY 6 HOURS PRN
Status: DISCONTINUED | OUTPATIENT
Start: 2023-11-04 | End: 2023-11-07 | Stop reason: HOSPADM

## 2023-11-04 RX ORDER — FLUMAZENIL 0.1 MG/ML
0.2 INJECTION, SOLUTION INTRAVENOUS
Status: DISCONTINUED | OUTPATIENT
Start: 2023-11-04 | End: 2023-11-04 | Stop reason: HOSPADM

## 2023-11-04 RX ORDER — SIMVASTATIN 40 MG
40 TABLET ORAL AT BEDTIME
COMMUNITY

## 2023-11-04 RX ORDER — PANTOPRAZOLE SODIUM 20 MG/1
20 TABLET, DELAYED RELEASE ORAL
Status: DISCONTINUED | OUTPATIENT
Start: 2023-11-04 | End: 2023-11-07 | Stop reason: HOSPADM

## 2023-11-04 RX ORDER — DIAZEPAM 5 MG
5 TABLET ORAL 2 TIMES DAILY PRN
COMMUNITY

## 2023-11-04 RX ORDER — FAMOTIDINE 20 MG/1
40 TABLET, FILM COATED ORAL AT BEDTIME
Status: DISCONTINUED | OUTPATIENT
Start: 2023-11-04 | End: 2023-11-07 | Stop reason: HOSPADM

## 2023-11-04 RX ORDER — LACOSAMIDE 100 MG/1
200 TABLET ORAL 2 TIMES DAILY
Status: DISCONTINUED | OUTPATIENT
Start: 2023-11-04 | End: 2023-11-07 | Stop reason: HOSPADM

## 2023-11-04 RX ORDER — LACOSAMIDE 200 MG/1
200 TABLET ORAL 2 TIMES DAILY
COMMUNITY

## 2023-11-04 RX ORDER — ZONISAMIDE 100 MG/1
300 CAPSULE ORAL AT BEDTIME
Status: DISCONTINUED | OUTPATIENT
Start: 2023-11-04 | End: 2023-11-06

## 2023-11-04 RX ORDER — DIAZEPAM 5 MG
5 TABLET ORAL 2 TIMES DAILY PRN
Status: DISCONTINUED | OUTPATIENT
Start: 2023-11-04 | End: 2023-11-07 | Stop reason: HOSPADM

## 2023-11-04 RX ORDER — PANTOPRAZOLE SODIUM 20 MG/1
20 TABLET, DELAYED RELEASE ORAL DAILY
COMMUNITY

## 2023-11-04 RX ORDER — FENTANYL CITRATE 50 UG/ML
25-50 INJECTION, SOLUTION INTRAMUSCULAR; INTRAVENOUS EVERY 5 MIN PRN
Status: DISCONTINUED | OUTPATIENT
Start: 2023-11-04 | End: 2023-11-04 | Stop reason: HOSPADM

## 2023-11-04 RX ADMIN — MIDAZOLAM 0.5 MG: 1 INJECTION INTRAMUSCULAR; INTRAVENOUS at 11:44

## 2023-11-04 RX ADMIN — FENTANYL CITRATE 50 MCG: 50 INJECTION, SOLUTION INTRAMUSCULAR; INTRAVENOUS at 11:12

## 2023-11-04 RX ADMIN — MIDAZOLAM 1 MG: 1 INJECTION INTRAMUSCULAR; INTRAVENOUS at 11:06

## 2023-11-04 RX ADMIN — MIDAZOLAM 0.5 MG: 1 INJECTION INTRAMUSCULAR; INTRAVENOUS at 11:52

## 2023-11-04 RX ADMIN — HEPARIN SODIUM 1300 UNITS/HR: 10000 INJECTION, SOLUTION INTRAVENOUS at 02:13

## 2023-11-04 RX ADMIN — MIDAZOLAM 0.5 MG: 1 INJECTION INTRAMUSCULAR; INTRAVENOUS at 12:23

## 2023-11-04 RX ADMIN — IOPAMIDOL 76 ML: 612 INJECTION, SOLUTION INTRAVENOUS at 12:34

## 2023-11-04 RX ADMIN — MIDAZOLAM 1 MG: 1 INJECTION INTRAMUSCULAR; INTRAVENOUS at 10:57

## 2023-11-04 RX ADMIN — FAMOTIDINE 40 MG: 20 TABLET ORAL at 20:20

## 2023-11-04 RX ADMIN — HEPARIN SODIUM 6 BAG: 200 INJECTION, SOLUTION INTRAVENOUS at 11:34

## 2023-11-04 RX ADMIN — LIDOCAINE HYDROCHLORIDE 20 ML: 10 INJECTION, SOLUTION INFILTRATION; PERINEURAL at 11:10

## 2023-11-04 RX ADMIN — ZONISAMIDE 300 MG: 100 CAPSULE ORAL at 20:19

## 2023-11-04 RX ADMIN — FENTANYL CITRATE 50 MCG: 50 INJECTION, SOLUTION INTRAMUSCULAR; INTRAVENOUS at 11:48

## 2023-11-04 RX ADMIN — LACOSAMIDE 200 MG: 100 TABLET, FILM COATED ORAL at 20:19

## 2023-11-04 RX ADMIN — ONDANSETRON 4 MG: 4 TABLET, ORALLY DISINTEGRATING ORAL at 02:39

## 2023-11-04 RX ADMIN — SIMVASTATIN 40 MG: 40 TABLET, FILM COATED ORAL at 20:20

## 2023-11-04 RX ADMIN — MIDAZOLAM 0.5 MG: 1 INJECTION INTRAMUSCULAR; INTRAVENOUS at 11:13

## 2023-11-04 RX ADMIN — FENTANYL CITRATE 50 MCG: 50 INJECTION, SOLUTION INTRAMUSCULAR; INTRAVENOUS at 11:07

## 2023-11-04 RX ADMIN — ACETAMINOPHEN 650 MG: 325 TABLET, FILM COATED ORAL at 02:42

## 2023-11-04 ASSESSMENT — ACTIVITIES OF DAILY LIVING (ADL)
ADLS_ACUITY_SCORE: 35

## 2023-11-04 NOTE — IR NOTE
Interventional Radiology Intra-procedural Nursing Note    Patient Name: Suma Iverson  Medical Record Number: [id number]  Today's Date: November 4, 2023    Procedure consented for :  Pulmonary angiogram and thrombectomy   Start time: 1105  End time: 1226  Report provided to: JOHN RN  Patient depart time and location: 1240 to 259    Note: Patient entered Interventional Radiology Suite number 1 via cart. Patient awake, alert and oriented. Assisted onto procedural table in Supine position. Prepped and draped.  Dr. Washington in room. Time out and procedure started. Vital signs stable. Telemetry reading SR.    Procedure well tolerated by patient without complications. Procedure end with debrief by Dr. Washington.  Pressure held until hemostasis achieved. Per close x 2 and gauze with Tegaderm dressing applied to right groin.    4 hours bedrest.    Administered medication totals:  Lidocaine 1% 20 mL Intradermal  Heparin 1000 Units infusion  Versed 4 mg IVP  Fentanyl 250 mcg IVP    Last dose of sedation administered at 1223.

## 2023-11-04 NOTE — H&P
[facility] [place]    History and Physical - Hospitalist Service       Date of Admission:  11/3/2023    Assessment & Plan    Suma Iverson is a 78 year old female who developed cough and shortness of breath.  She was seen in an emergency room in October 14 where a CT of the chest was negative but she tested positive for COVID.  She was given Paxlovid and sent home.  On October 20 she went back to the emergency room with diarrhea was evaluated and sent home.  She will back to the emergency room on October 28 with abdominal pain.  CT showed already diagnosed diverticulosis and fibroids.  Other evaluation was negative.  She was sent home once again.  Today she went back yet again to the emergency room complaining of a cough and shortness of breath.  Her vital signs were stable heart rate was a little on the elevated side in the 90s oxygen saturation was 99%.  She looked tachypneic.  EKG showed sinus rhythm and an incomplete right bundle branch block T wave inversions new in V2 and V3.  CT of the chest PE study  1.  Large saddle embolism is noted with extension into the lobar and  segmental branches of all lobes. Severe right heart strain. Recommend  consultation with interventional radiology.    Lower extremity venous Doppler ultrasound  1.  Short segment occlusive deep vein thrombus in duplicated left  popliteal vein.  2.  No deep venous thrombosis in the right lower extremity.    BNP was 2900 troponin 44 platelets 140,000  She was started on IV heparin.  Dr. Cosme was contacted and the patient was transferred to [facility] for probable thrombectomy.e     Large saddle embolism with extension into the lobar and segmental branches of all lobes  Short segment occlusive deep vein thrombus in duplicated left popliteal vein  Severe right heart strain  Despite the large saddle pulmonary embolism with right heart strain, the patient is hemodynamically stable and was on 1 L of oxygen when I saw  her.  She is comfortable she does not feel short of breath and she is not having any chest pain.  Admit to [facility]  Bedrest   Continue IV heparin  N.p.o. for now  I paged the on-call interventional radiologist to see what the plan is for thrombectomy    Addendum  Discussed with the interventional radiologist and he will do the procedure in the morning.  N.p.o. after midnight.    Recent COVID-19 infection - 10.14/2023  Resolved.    Hypertension   Med reconciliation not done    Obstructive sleep apnea   Continue continuous positive airway pressure     Seizure  disorder   Med reconciliation not done    Type 2 diabetes mellitus   Med reconciliation not done  Aspart correction scale     Anxiety   Med reconciliation not done        Diet: NPO per Anesthesia Guidelines for Procedure/Surgery Except for: Meds    DVT Prophylaxis: intravenous heparin   Panda Catheter: Not present  Lines: None     Cardiac Monitoring: ACTIVE order. Indication: PE  Code Status: Full Code      Clinically Significant Risk Factors Present on Admission                # Thrombocytopenia: Lowest platelets = 124 in last 2 days, will monitor for bleeding   # Hypertension: Noted on problem list                 Disposition Plan      Expected Discharge Date: 11/05/2023                  Bentley Bhandari MD  Hospitalist Service  [facility]  Securely message with [employer] (more info)  Text page via [employer] Paging/Directory     ______________________________________________________________________    Chief Complaint   Pulmonary embolism     History is obtained from the patient and electronic health record    History of Present Illness   Suma Iverson is a 78 year old female who developed cough and shortness of breath.  She was seen in an emergency room in October 14 where a CT of the chest was negative but she tested positive for COVID.  She was given Paxlovid and sent home.  On October 20 she went back to the emergency room with  diarrhea was evaluated and sent home.  She will back to the emergency room on October 28 with abdominal pain.  CT showed already diagnosed diverticulosis and fibroids.  Other evaluation was negative.  She was sent home once again.  Today she went back yet again to the emergency room complaining of a cough and shortness of breath.  Her vital signs were stable heart rate was a little on the elevated side in the 90s oxygen saturation was 99%.  She looked tachypneic.  EKG showed sinus rhythm and an incomplete right bundle branch block T wave inversions new in V2 and V3.  CT of the chest PE study  1.  Large saddle embolism is noted with extension into the lobar and  segmental branches of all lobes. Severe right heart strain. Recommend  consultation with interventional radiology.    Lower extremity venous Doppler ultrasound  1.  Short segment occlusive deep vein thrombus in duplicated left  popliteal vein.  2.  No deep venous thrombosis in the right lower extremity.    BNP was 2900 troponin 44 platelets 140,000  She was started on IV heparin.  Dr. Cosme was contacted and the patient was transferred to [facility] for probable thrombectomy.    Past Medical History    Past Medical History:   Diagnosis Date    Generalized anxiety disorder     HTN (hypertension)     Hyperlipidemia LDL goal <100     Obesity     BRANDON (obstructive sleep apnea)     Peptic ulcer disease     Seizure disorder (H)     Type 2 diabetes mellitus (H)     Uterine leiomyoma, unspecified location        Past Surgical History   Past Surgical History:   Procedure Laterality Date    AS REMV CATARACT INTRACAP,INSERT LENS      UTERINE FIBROID SURGERY         Prior to Admission Medications   Prior to Admission Medications   Prescriptions Last Dose Informant Patient Reported? Taking?   albuterol (PROAIR HFA/PROVENTIL HFA/VENTOLIN HFA) 108 (90 Base) MCG/ACT inhaler   No Yes   Sig: Inhale 2 puffs into the lungs every 6 hours as needed for shortness of  breath, wheezing or cough   sucralfate (CARAFATE) 1 GM tablet   No Yes   Sig: Take 1 tablet (1 g) by mouth 2 times daily      Facility-Administered Medications: None        Review of Systems    The 10 point Review of Systems is negative other than noted in the HPI or here.     Social History   I have reviewed this patient's social history and updated it with pertinent information if needed.  Social History     Tobacco Use    Smoking status: Former     Types: Cigarettes         Allergies   Allergies   Allergen Reactions    Levetiracetam Anxiety and Other (See Comments)     Other Reaction(s): Anxiety, Irritable        Physical Exam   Vital Signs:                    Weight: 0 lbs 0 oz  Value Min Max   Temp 98.1  F (36.7  C) 98.1  F (36.7  C)   Pulse 88 104   Resp 11 32 Abnormal    BP: Systolic 106 135   BP: Diastolic 52 103 Abnormal    SpO2 87 % Abnormal  99 %   Oximeter Heart Rate 88 bpm 108 bpm     Constitutional: awake, alert, cooperative, no apparent distress  Eyes: Lids and lashes normal, pupils equal, round, sclera clear, conjunctiva normal  ENT: Normocephalic, without obvious abnormality, atraumatic  Respiratory: No increased work of breathing, good air exchange, clear to auscultation bilaterally, no crackles or wheezing  Cardiovascular: regular rate and rhythm, normal S1 and S2, no S3 or S4, and no murmur noted  GI: normal bowel sounds, soft, non-distended, non-tenderted to name, place and time.  Cranial nerves II-XII are grossly intact.  Motor is 5 out of 5 bilaterally.  Neuropsychiatric: General: normal, calm and normal eye contact    Medical Decision Making       MANAGEMENT DISCUSSED with the following over the past 24 hours:     NOTE(S)/MEDICAL RECORDS REVIEWED over the past 24 hours: EPIC       Data     I have personally reviewed the following data over the past 24 hrs:    7.5  \   14.1   / 124 (L)     142 109 (H) 17.4 /  113 (H)   3.7 22 0.82 \     ALT: 11 AST: 18 AP: 86 TBILI: 0.4   ALB: 4.3 TOT PROTEIN:  6.8 LIPASE: N/A     Trop: 44 (H) BNP: 2,849 (H)       Imaging results reviewed over the past 24 hrs:   Recent Results (from the past 24 hour(s))   CT Chest Pulmonary Embolism w Contrast    Narrative    CT CHEST PULMONARY EMBOLISM W CONTRAST 11/3/2023 11:37 AM    CLINICAL HISTORY: Patent with new exertional shortness of breath after  being diagnosed wth Covid 10/14  TECHNIQUE: CT angiogram chest during arterial phase injection IV  contrast. 2D and 3D MIP reconstructions were performed by the CT  technologist. Dose reduction techniques were used.     CONTRAST: 77mL Isovue-370    COMPARISON: 10/14/2023    FINDINGS:  ANGIOGRAM CHEST: Large saddle pulmonary embolism is noted which  extends into the lobar and segmental branches of all lobes. Severe  right heart strain. Thoracic aorta is negative for dissection.    LUNGS AND PLEURA: Minimal atelectasis is noted at the lung bases. No  focal consolidation or effusion.    MEDIASTINUM/AXILLAE: Heart is normal in size. No mediastinal,  axillary, or hilar adenopathy.    UPPER ABDOMEN: Small left renal cysts which are benign and need no  further follow-up.    MUSCULOSKELETAL: Degenerative changes of the spine.      Impression    IMPRESSION:  1.  Large saddle embolism is noted with extension into the lobar and  segmental branches of all lobes. Severe right heart strain. Recommend  consultation with interventional radiology.    Findings were discussed with Dr. Ron Mitchell at 11:52 am on 11/03/2023.    DK CALLE MD         SYSTEM ID:  [id number]   US Lower Extremity Venous Duplex Bilateral    Narrative    US LOWER EXTREMITY VENOUS DUPLEX BILATERAL 11/3/2023 1:33 PM    CLINICAL HISTORY: Shortness of breath Eval for lower extremity clots  please  TECHNIQUE: Venous Duplex ultrasound of bilateral lower extremities  with and without compression, augmentation and duplex. Color flow and  spectral Doppler with waveform analysis performed.    COMPARISON: None.    FINDINGS: Exam includes the  common femoral, femoral, popliteal veins  as well as segmentally visualized deep calf veins and greater  saphenous vein.     RIGHT: No deep vein thrombosis. No superficial thrombophlebitis. No  popliteal cyst.    LEFT: Duplicated venous system at the level of the knee with short  segment occlusive thrombus in one of the popliteal veins. The other  popliteal vein is patent. No superficial thrombophlebitis. No  popliteal cyst.      Impression    IMPRESSION:  1.  Short segment occlusive deep vein thrombus in duplicated left  popliteal vein.  2.  No deep venous thrombosis in the right lower extremity.    JAMILA BAUMANN MD         SYSTEM ID:  [id number]

## 2023-11-04 NOTE — PRE-PROCEDURE
Interventional Radiology Pre-Procedure Sedation Assessment   Time of Assessment: 10:44 AM    Expected Level: Moderate Sedation    Indication: Sedation is required for the following type of Procedure: Pulmonary angiogram and thrombectomy    Sedation and procedural consent: Risks, benefits and alternatives were discussed with Patient    PO Intake: Appropriately NPO for procedure    ASA Class: Class 2 - MILD SYSTEMIC DISEASE, NO ACUTE PROBLEMS, NO FUNCTIONAL LIMITATIONS.    Mallampati: Grade 2:  Soft palate, base of uvula, tonsillar pillars, and portion of posterior pharyngeal wall visible    Lungs: Lungs Clear with good breath sounds bilaterally    Heart: Normal heart sounds and rate    History and physical reviewed and no updates needed. I have reviewed the lab findings, diagnostic data, medications, and the plan for sedation. I have determined this patient to be an appropriate candidate for the planned sedation and procedure and have reassessed the patient IMMEDIATELY PRIOR to sedation and procedure.    Sergio Washington MD

## 2023-11-04 NOTE — PROCEDURES
Interventional Radiology Post-Procedure Note     Patient name:  Suma Iverson  MRN:  [id number]   Date:  11/4/2023     Procedure(s): Pulmonary angiogram and mechanical thrombectomy    Attending:  Felix    Sedation:  Moderate sedation    Pre/Post Procedure Diagnosis: Submassive PE    Drains/Lines:  None    Specimen(s):  None    Estimated Blood Loss:  Minimal    Complications:  None     Findings:   Large burden pulmonary emboli including saddle and occlusive lobar/segmental components.  Significant volume predominantly acute thrombus removed with suction thrombectomy device, with excellent angiographic result and reduction in PASP (41 to 34 mmHg).  Right CFV access hemostasis with Proglide devices.    Please see dictation in PACS or under the Imaging tab in EPIC for detailed procedure note.    Plan:    -Bedrest with RLE straight for 4 hours post-procedure  -Continue anticoagulation with heparin gtt, with bridging to PO agent per primary team    Sergio Washington MD  Vascular & Interventional Radiology  11/4/2023  12:30 PM

## 2023-11-04 NOTE — PLAN OF CARE
Pt here with cough and SOB, found to have saddle PE. A&Ox4. Neuros include rambling speech, repeating the same questions, irritable, tearful, anxious. VSS on RA. Tele SR. Note says to remain on BR. Tylenol given for headache and Zofran given for nausea. Heparin at 1000. Pt scoring yellow on the Aggression Stop Light Tool. Plan for possible thrombectomy. Discharge pending.

## 2023-11-04 NOTE — PHARMACY-ADMISSION MEDICATION HISTORY
Pharmacist Admission Medication History    Admission medication history is complete. The information provided in this note is only as accurate as the sources available at the time of the update.    Information Source(s): Patient, CareEverywhere/SureScripts, and med list brought in by   via in-person    Changes made to PTA medication list:  Added: all  Deleted: sucralfate  Changed: None    Allergies reviewed with patient and updates made in EHR: no    Medication History Completed By: Denise James RPH 11/4/2023 9:48 AM    Prior to Admission medications    Medication Sig Last Dose Taking? Auth Provider Long Term End Date   albuterol (PROAIR HFA/PROVENTIL HFA/VENTOLIN HFA) 108 (90 Base) MCG/ACT inhaler Inhale 2 puffs into the lungs every 6 hours as needed for shortness of breath, wheezing or cough prn Yes Jerod Sawant MD Yes    diazepam (VALIUM) 5 MG tablet Take 5 mg by mouth 2 times daily as needed for anxiety prn Yes Unknown, Entered By History     famotidine (PEPCID) 40 MG tablet Take 40 mg by mouth at bedtime 11/2/2023 Yes Unknown, Entered By History     lacosamide (VIMPAT) 200 MG TABS tablet Take 200 mg by mouth 2 times daily 11/3/2023 at am Yes Unknown, Entered By History Yes    lisinopril (ZESTRIL) 10 MG tablet Take 10 mg by mouth daily 11/3/2023 Yes Unknown, Entered By History Yes    pantoprazole (PROTONIX) 20 MG EC tablet Take 20 mg by mouth daily 11/3/2023 Yes Unknown, Entered By History     simvastatin (ZOCOR) 40 MG tablet Take 40 mg by mouth at bedtime 11/2/2023 Yes Unknown, Entered By History Yes    vitamin D2 (ERGOCALCIFEROL) 88732 units (1250 mcg) capsule Take 50,000 Units by mouth once a week 10/29/2023 Yes Unknown, Entered By History     zonisamide (ZONEGRAN) 100 MG capsule Take 300 mg by mouth at bedtime 11/2/2023 Yes Unknown, Entered By History Yes

## 2023-11-04 NOTE — PLAN OF CARE
[place] is alert, oriented, overwhelmed. She had a thrombectomy today. Right groin site (venous) is soft and non-tender with intact distal CMS. She denies discomfort, nausea, dyspnea. Anxiety appears improved after her procedure. Discharge plans pending.

## 2023-11-04 NOTE — PROGRESS NOTES
[facility]    Medicine Progress Note - Hospitalist Service    Date of Admission:  11/3/2023    Assessment & Plan   Suma Iverson is a 78 year old female who developed cough and shortness of breath.  She was seen in an emergency room in October 14 where a CT of the chest was negative but she tested positive for COVID.  She was given Paxlovid and sent home.  On October 20 she went back to the emergency room with diarrhea was evaluated and sent home.  She will back to the emergency room on October 28 with abdominal pain.  CT showed already diagnosed diverticulosis and fibroids.  Other evaluation was negative.  She was sent home once again.  Today she went back yet again to the emergency room complaining of a cough and shortness of breath.  Her vital signs were stable heart rate was a little on the elevated side in the 90s oxygen saturation was 99%.  She looked tachypneic.  EKG showed sinus rhythm and an incomplete right bundle branch block T wave inversions new in V2 and V3.  CT of the chest PE study  1.  Large saddle embolism is noted with extension into the lobar and  segmental branches of all lobes. Severe right heart strain. Recommend  consultation with interventional radiology.     Lower extremity venous Doppler ultrasound  1.  Short segment occlusive deep vein thrombus in duplicated left  popliteal vein.  2.  No deep venous thrombosis in the right lower extremity.     BNP was 2900 troponin 44 platelets 140,000  She was started on IV heparin.  Dr. Cosme was contacted and the patient was transferred to [facility] for probable thrombectomy.e      Large saddle embolism with extension into the lobar and segmental branches of all lobes  Short segment occlusive deep vein thrombus in duplicated left popliteal vein  Severe right heart strain  S/P mechanical thrombectomy 11/4   Despite the large saddle pulmonary embolism with right heart strain, the patient is hemodynamically stable  and was on 1 L of oxygen when I saw her.  She is comfortable she does not feel short of breath and she is not having any chest pain.  Activity per IR protocol post thrombectomy  Continue IV heparin - will check DOAC coverage with pharmacy liaison and likely transition to PO AC 11/5  Prn analgesia  Echocardiogram  Follow on tele  Anticipate vascular medicine clinic or hem/onc consult for discharge follow up    Recent COVID-19 infection - 10.14/2023  Resolved.     Hypertension   BP variable - PTA lisinopril held for 11/4     Obstructive sleep apnea   Continue continuous positive airway pressure      Seizure  disorder   PTA regimen resumed 11/4     Type 2 diabetes mellitus   A1c 6.1%  Aspart correction scale   Resume home regimen, if any, upon discharge     Anxiety   PTA regimen resumed, prn valium noted           Diet: NPO per Anesthesia Guidelines for Procedure/Surgery Except for: Meds    DVT Prophylaxis: heparin gtt   Panda Catheter: Not present  Lines: None     Cardiac Monitoring: ACTIVE order. Indication: PE  Code Status: Full Code      Clinically Significant Risk Factors Present on Admission                # Thrombocytopenia: Lowest platelets = 122 in last 2 days, will monitor for bleeding   # Hypertension: Noted on problem list                 Disposition Plan      Expected Discharge Date: 11/05/2023                    Colton Mcarthur MD  Hospitalist Service  [facility]  Securely message with [employer] (more info)  Text page via [employer] Paging/Directory   ______________________________________________________________________    Interval History   Care assumed today. Pt seen and examined after IR thrombectomy. Resting comfortably after IR procedure. No new complaints or issues relayed.    Physical Exam   Vital Signs: Temp: 97.8  F (36.6  C) Temp src: Oral BP: 93/52 Pulse: 82   Resp: 16 SpO2: 95 %      Weight: 0 lbs 0 oz    Gen: NAD, pleasant  HEENT: EOMI, MMM  Resp: no audible crackles or  wheezes, no increased work of resp  CV: RRR  Abdo:  nondistended      Medical Decision Making       39 MINUTES SPENT BY ME on the date of service doing chart review, history, exam, documentation & further activities per the note.      Data     I have personally reviewed the following data over the past 24 hrs:    7.2  \   13.4   / 122 (L)     139 106 18.0 /  122 (H)   4.1 20 (L) 0.79 \     TSH: N/A T4: N/A A1C: 6.1 (H)

## 2023-11-04 NOTE — PLAN OF CARE
Payton arrived from [facility] ED to [facility] [place] this evening. Heparin infusing upon transfer at 1600 units/hr. SaO2 stable on 1-2L via nasal cannula. She is alert, oriented, tearful, anxious. Offered reassurance and education. Able to stand and pivot from cart to bed; activity otherwise minimized.

## 2023-11-04 NOTE — PROGRESS NOTES
Received call from Lab- heparin level greater than 1.1.  pharmacist will be changing heparin amounts.

## 2023-11-05 ENCOUNTER — APPOINTMENT (OUTPATIENT)
Dept: CARDIOLOGY | Facility: CLINIC | Age: 78
DRG: 164 | End: 2023-11-05
Attending: HOSPITALIST
Payer: MEDICARE

## 2023-11-05 LAB
GLUCOSE BLDC GLUCOMTR-MCNC: 106 MG/DL (ref 70–99)
GLUCOSE BLDC GLUCOMTR-MCNC: 129 MG/DL (ref 70–99)
GLUCOSE BLDC GLUCOMTR-MCNC: 143 MG/DL (ref 70–99)
LVEF ECHO: NORMAL
UFH PPP CHRO-ACNC: 0.45 IU/ML
UFH PPP CHRO-ACNC: 0.49 IU/ML
UFH PPP CHRO-ACNC: 0.57 IU/ML

## 2023-11-05 PROCEDURE — 99223 1ST HOSP IP/OBS HIGH 75: CPT | Performed by: INTERNAL MEDICINE

## 2023-11-05 PROCEDURE — 93306 TTE W/DOPPLER COMPLETE: CPT | Mod: 26 | Performed by: INTERNAL MEDICINE

## 2023-11-05 PROCEDURE — 250N000011 HC RX IP 250 OP 636: Performed by: HOSPITALIST

## 2023-11-05 PROCEDURE — 85520 HEPARIN ASSAY: CPT | Performed by: HOSPITALIST

## 2023-11-05 PROCEDURE — 999N000208 ECHOCARDIOGRAM COMPLETE

## 2023-11-05 PROCEDURE — 36415 COLL VENOUS BLD VENIPUNCTURE: CPT | Performed by: INTERNAL MEDICINE

## 2023-11-05 PROCEDURE — 99233 SBSQ HOSP IP/OBS HIGH 50: CPT | Performed by: HOSPITALIST

## 2023-11-05 PROCEDURE — 36415 COLL VENOUS BLD VENIPUNCTURE: CPT | Performed by: HOSPITALIST

## 2023-11-05 PROCEDURE — 86146 BETA-2 GLYCOPROTEIN ANTIBODY: CPT | Performed by: INTERNAL MEDICINE

## 2023-11-05 PROCEDURE — 250N000013 HC RX MED GY IP 250 OP 250 PS 637: Performed by: INTERNAL MEDICINE

## 2023-11-05 PROCEDURE — 210N000002 HC R&B HEART CARE

## 2023-11-05 PROCEDURE — 86147 CARDIOLIPIN ANTIBODY EA IG: CPT | Performed by: INTERNAL MEDICINE

## 2023-11-05 PROCEDURE — 255N000002 HC RX 255 OP 636: Performed by: HOSPITALIST

## 2023-11-05 PROCEDURE — 250N000013 HC RX MED GY IP 250 OP 250 PS 637: Performed by: HOSPITALIST

## 2023-11-05 PROCEDURE — 80048 BASIC METABOLIC PNL TOTAL CA: CPT | Performed by: HOSPITALIST

## 2023-11-05 RX ORDER — DIAZEPAM 5 MG
5 TABLET ORAL
Status: DISCONTINUED | OUTPATIENT
Start: 2023-11-05 | End: 2023-11-06

## 2023-11-05 RX ORDER — SENNOSIDES 8.6 MG
8.6 TABLET ORAL 2 TIMES DAILY PRN
Status: DISCONTINUED | OUTPATIENT
Start: 2023-11-05 | End: 2023-11-07 | Stop reason: HOSPADM

## 2023-11-05 RX ORDER — CALCIUM CARBONATE 750 MG/1
750 TABLET, CHEWABLE ORAL 3 TIMES DAILY PRN
Status: DISCONTINUED | OUTPATIENT
Start: 2023-11-05 | End: 2023-11-05

## 2023-11-05 RX ORDER — CALCIUM CARBONATE 500 MG/1
500 TABLET, CHEWABLE ORAL 3 TIMES DAILY PRN
Status: DISCONTINUED | OUTPATIENT
Start: 2023-11-05 | End: 2023-11-07 | Stop reason: HOSPADM

## 2023-11-05 RX ORDER — BENZONATATE 100 MG/1
100 CAPSULE ORAL 3 TIMES DAILY PRN
Status: DISCONTINUED | OUTPATIENT
Start: 2023-11-05 | End: 2023-11-07 | Stop reason: HOSPADM

## 2023-11-05 RX ORDER — MAGNESIUM HYDROXIDE/ALUMINUM HYDROXICE/SIMETHICONE 120; 1200; 1200 MG/30ML; MG/30ML; MG/30ML
30 SUSPENSION ORAL EVERY 4 HOURS PRN
Status: DISCONTINUED | OUTPATIENT
Start: 2023-11-05 | End: 2023-11-07 | Stop reason: HOSPADM

## 2023-11-05 RX ADMIN — CALCIUM CARBONATE (ANTACID) CHEW TAB 500 MG 500 MG: 500 CHEW TAB at 20:12

## 2023-11-05 RX ADMIN — ONDANSETRON 4 MG: 4 TABLET, ORALLY DISINTEGRATING ORAL at 08:52

## 2023-11-05 RX ADMIN — ALUMINUM HYDROXIDE, MAGNESIUM HYDROXIDE, AND SIMETHICONE 30 ML: 200; 200; 20 SUSPENSION ORAL at 17:02

## 2023-11-05 RX ADMIN — SENNOSIDES 8.6 MG: 8.6 TABLET, FILM COATED ORAL at 17:02

## 2023-11-05 RX ADMIN — HUMAN ALBUMIN MICROSPHERES AND PERFLUTREN 9 ML: 10; .22 INJECTION, SOLUTION INTRAVENOUS at 09:18

## 2023-11-05 RX ADMIN — ACETAMINOPHEN 650 MG: 325 TABLET, FILM COATED ORAL at 12:07

## 2023-11-05 RX ADMIN — ONDANSETRON 4 MG: 4 TABLET, ORALLY DISINTEGRATING ORAL at 00:26

## 2023-11-05 RX ADMIN — DIAZEPAM 5 MG: 5 TABLET ORAL at 08:52

## 2023-11-05 RX ADMIN — FAMOTIDINE 40 MG: 20 TABLET ORAL at 20:58

## 2023-11-05 RX ADMIN — BENZONATATE 100 MG: 100 CAPSULE ORAL at 20:57

## 2023-11-05 RX ADMIN — LACOSAMIDE 200 MG: 100 TABLET, FILM COATED ORAL at 09:35

## 2023-11-05 RX ADMIN — ONDANSETRON 4 MG: 4 TABLET, ORALLY DISINTEGRATING ORAL at 16:09

## 2023-11-05 RX ADMIN — PANTOPRAZOLE SODIUM 20 MG: 20 TABLET, DELAYED RELEASE ORAL at 06:57

## 2023-11-05 RX ADMIN — ALUMINUM HYDROXIDE, MAGNESIUM HYDROXIDE, AND SIMETHICONE 30 ML: 200; 200; 20 SUSPENSION ORAL at 23:33

## 2023-11-05 RX ADMIN — HEPARIN SODIUM 800 UNITS/HR: 10000 INJECTION, SOLUTION INTRAVENOUS at 03:31

## 2023-11-05 ASSESSMENT — ACTIVITIES OF DAILY LIVING (ADL)
ADLS_ACUITY_SCORE: 35

## 2023-11-05 NOTE — PROGRESS NOTES
[facility]    Medicine Progress Note - Hospitalist Service    Date of Admission:  11/3/2023    Assessment & Plan   Suma Iverson is a 78 year old female who developed cough and shortness of breath.  She was seen in an emergency room in October 14 where a CT of the chest was negative but she tested positive for COVID.  She was given Paxlovid and sent home.  On October 20 she went back to the emergency room with diarrhea was evaluated and sent home.  She will back to the emergency room on October 28 with abdominal pain.  CT showed already diagnosed diverticulosis and fibroids.  Other evaluation was negative.  She was sent home once again.  Today she went back yet again to the emergency room complaining of a cough and shortness of breath.  Her vital signs were stable heart rate was a little on the elevated side in the 90s oxygen saturation was 99%.  She looked tachypneic.  EKG showed sinus rhythm and an incomplete right bundle branch block T wave inversions new in V2 and V3.  CT of the chest PE study  1.  Large saddle embolism is noted with extension into the lobar and  segmental branches of all lobes. Severe right heart strain. Recommend  consultation with interventional radiology.     Lower extremity venous Doppler ultrasound  1.  Short segment occlusive deep vein thrombus in duplicated left  popliteal vein.  2.  No deep venous thrombosis in the right lower extremity.     BNP was 2900 troponin 44 platelets 140,000  She was started on IV heparin.  Dr. Cosme was contacted and the patient was transferred to [facility] for probable thrombectomy.   Patient is now s/p mechanical thrombectomy and doing well on RA     Large saddle embolism with extension into the lobar and segmental branches of all lobes  Short segment occlusive deep vein thrombus in duplicated left popliteal vein  Severe right heart strain  S/P mechanical thrombectomy 11/4   Despite the large saddle pulmonary embolism  with right heart strain, the patient is hemodynamically stable and was on 1 L of oxygen when I saw her.  She is comfortable she does not feel short of breath and she is not having any chest pain.  Activity per IR protocol post thrombectomy  Continue IV heparin - will check DOAC coverage with pharmacy liaison and likely transition to PO AC 11/5 PM or AM 11/6  Prn analgesia  Echocardiogram  Follow on tele  Anticipate vascular medicine clinic or hem/onc consult for discharge follow up     Recent COVID-19 infection - 10/14/2023  Resolved.     Hypertension   BP variable - PTA lisinopril held for 11/4     Obstructive sleep apnea   Continue continuous positive airway pressure      Seizure disorder   PTA regimen resumed 11/4     Type 2 diabetes mellitus   A1c 6.1%  Aspart correction scale   Resume home regimen, if any, upon discharge     Anxiety   PTA regimen resumed, prn valium noted    Of note, patient is very stressed at times and reports concerns of things like stomach or colon cancer. She reports having had an upper endoscopy this past summer without cancer found and with ulcers resolved. She states colonoscopy is planned for the coming weeks when she gets to [place].  She also endorses significant frustrations with her prior PCPs particularly at [facility].                 Diet: Moderate Consistent Carb (60 g CHO per Meal) Diet    DVT Prophylaxis: heparin gtt  Panda Catheter: Not present  Lines: None     Cardiac Monitoring: ACTIVE order. Indication: PE  Code Status: Full Code      Clinically Significant Risk Factors                # Thrombocytopenia: Lowest platelets = 122 in last 2 days, will monitor for bleeding   # Hypertension: Noted on problem list                   Disposition Plan      Expected Discharge Date: 11/06/2023                    Colton Mcarthur MD  Hospitalist Service  [facility]  Securely message with [employer] (more info)  Text page via [employer] Paging/Directory    ______________________________________________________________________    Interval History   Pt seen and examined. Denies sob, chest pain, but at times has endorsed abdominal discomfort and nausea - somewhat inconsistently. Appreciate bedside RN efforts.  Spouse at bedside. Discussed that many of her concerns will need to be evaluated and continued care with outpatient management. They are planning on going back to [place] in the coming weeks - she reports upcoming colonoscopy.    Physical Exam   Vital Signs: Temp: 98.1  F (36.7  C) Temp src: Oral BP: 109/66 Pulse: 86   Resp: 18 SpO2: 97 % O2 Device: None (Room air) Oxygen Delivery: 4 LPM  Weight: 184 lbs 15.46 oz    Gen: NAD, intermittently anxious  HEENT: EOMI, MMM  Resp: no focal crackles, no wheezes, no increased work of resp  CV: S1S2 heard, reg rhythm, reg rate  Abdo: soft, nontender, nondistended, bowel sounds present  Ext: calves nontender, well perfused  Neuro: aa, conversant, moving ext, CN grossly intact, no facial asymmetry      Medical Decision Making       52 MINUTES SPENT BY ME on the date of service doing chart review, history, exam, documentation & further activities per the note.      Data

## 2023-11-05 NOTE — PLAN OF CARE
Neuro- A&Ox2  Most Recent Vitals- Temp: 98.1  F (36.7  C) Temp src: Oral BP: 93/69 Pulse: 78   Resp: 18 SpO2: 97 % O2 Device: None (Room air)   Tele/Cardiac- SR  Resp- LS clear on RA  Activity- SBA  Pain- denies  Drips- hep gtt  Drains/Tubes- PIV  Skin- scattered bruising  GI/- voiding adequately  Aggression Color- Green  COVID status- Negative  Plan- discharge pending bridge to oral AC  Misc-     Lovely Winston, RN Goal Outcome Evaluation:

## 2023-11-05 NOTE — CONSULTS
[facility]    Hematology/Oncology Consult Note      Date of Admission:  11/3/2023  Date of Consult:  11/05/23  Reason for Consult: Saddle embolus      ASSESSMENT/PLAN:  Suma Iverson is a 78 year old female who was admitted with a saddle embolus in the setting of COVID-positive testing     Saddle embolus and DVT  Suspect she had hypercoagulability due to COVID infection.  I will check for cardiolipin and beta-2 glycoprotein antibodies but okay to start her on DOACs.  She can follow-up with her primary care physician to follow-up on the testing and if positive she can see hematology.  In regards to duration of anticoagulation it similar to individuals with DVT or PE.  My recommendation would be based on the clot burden to continue anticoagulation for 6 months and ensure that she is recovered from COVID and that time repeat the CT of chest to make sure there is no residual clot and then discontinue anticoagulation.    Abdominal Pain : per primary tea  Colonoscopy and elective procedures should not be done for 3 months  Dysphagia: consider Gi consult pt requested it, will Let Dr Mcarthur decide after he discusses ith her     I will sign off.  Thank you for the consult please call if there are any questions      Total time spent on day of visit, including review of tests, obtaining/reviewing separately obtained history, ordering medications/tests/procedures, communicating with PCP/consultants, and documenting in electronic medical record: 85  minutes       HISTORY OF PRESENT ILLNESS: Suma Iverson is a 78-year-old female who is admitted with cough and shortness of breath she was seen in the emergency room after reporting her CT of the chest was negative as she tested positive for COVID.  She was given Paxil and sent home.  October 20 she came to the emergency room with diarrhea and was sent home.  After 28 she came in with abdominal pain CT showed diagnosed diverticulosis and fibroids  otherwise evaluation negative.  She then came back again yesterday with cough and shortness of breath CT of the chest showed large saddle embolus with extension into the lobar and segmental branches of all lobes.  Lower extremity Doppler showed short segment occlusive DVT.  Status post mechanical thrombectomy on 11/4/2023 started on anticoagulation.   Was pretty sedentary after covid infection. Also has abdominal discomfort. Lives in [place] in winter and has elective colonosocopy in [place]      Has problems with swallowing pills, has stuff Stuck in throat sometimes . Endoscopy negative earlier this year        REVIEW OF SYSTEMS:   14 point ROS was reviewed and is negative other than as noted above in HPI.       MEDICATIONS:  Current Facility-Administered Medications   Medication    acetaminophen (TYLENOL) tablet 650 mg    Or    acetaminophen (TYLENOL) Suppository 650 mg    albuterol (PROVENTIL HFA/VENTOLIN HFA) inhaler    glucose gel 15-30 g    Or    dextrose 50 % injection 25-50 mL    Or    glucagon injection 1 mg    diazepam (VALIUM) tablet 5 mg    famotidine (PEPCID) tablet 40 mg    heparin infusion 25,000 units in D5W 250 mL ANTICOAGULANT    insulin aspart (NovoLOG) injection (RAPID ACTING)    Lacosamide (VIMPAT) tablet 200 mg    lidocaine (LMX4) cream    lidocaine 1 % 0.1-1 mL    [Held by provider] lisinopril (ZESTRIL) tablet 10 mg    melatonin tablet 1 mg    nitroGLYcerin (NITROSTAT) sublingual tablet 0.4 mg    ondansetron (ZOFRAN ODT) ODT tab 4 mg    Or    ondansetron (ZOFRAN) injection 4 mg    pantoprazole (PROTONIX) EC tablet 20 mg    Patient is already receiving anticoagulation with heparin, enoxaparin (LOVENOX), warfarin (COUMADIN)  or other anticoagulant medication    polyethylene glycol (MIRALAX) Packet 17 g    prochlorperazine (COMPAZINE) injection 5 mg    Or    prochlorperazine (COMPAZINE) tablet 5 mg    Or    prochlorperazine (COMPAZINE) suppository 12.5 mg    simvastatin (ZOCOR) tablet 40 mg     sodium chloride (PF) 0.9% PF flush 3 mL    sodium chloride (PF) 0.9% PF flush 3 mL    sodium chloride 0.9 % infusion    zonisamide (ZONEGRAN) capsule 300 mg         ALLERGIES:  Allergies   Allergen Reactions    Levetiracetam Anxiety and Other (See Comments)     Other Reaction(s): Anxiety, Irritable         PAST MEDICAL HISTORY:  Past Medical History:   Diagnosis Date    Generalized anxiety disorder     HTN (hypertension)     Hyperlipidemia LDL goal <100     Obesity     BRANDON (obstructive sleep apnea)     Peptic ulcer disease     Seizure disorder (H)     Type 2 diabetes mellitus (H)     Uterine leiomyoma, unspecified location          PAST SURGICAL HISTORY:  Past Surgical History:   Procedure Laterality Date    AS REMV CATARACT INTRACAP,INSERT LENS      IR PULMONARY ANGIOGRAM BILATERAL  11/4/2023    UTERINE FIBROID SURGERY           SOCIAL HISTORY:  Social History     Socioeconomic History    Marital status:      Spouse name: Not on file    Number of children: Not on file    Years of education: Not on file    Highest education level: Not on file   Occupational History    Not on file   Tobacco Use    Smoking status: Former     Types: Cigarettes    Smokeless tobacco: Not on file   Substance and Sexual Activity    Alcohol use: Not on file    Drug use: Not on file    Sexual activity: Not on file   Other Topics Concern    Not on file   Social History Narrative    Not on file     Social Determinants of Health     Financial Resource Strain: Not on file   Food Insecurity: Not on file   Transportation Needs: Not on file   Physical Activity: Not on file   Stress: Not on file   Social Connections: Not on file   Interpersonal Safety: Not on file   Housing Stability: Not on file         FAMILY HISTORY:  No family history on file.      PHYSICAL EXAM:  Vital signs:  Temp: 98.1  F (36.7  C) Temp src: Oral BP: (!) 146/88 Pulse: 84   Resp: 18 SpO2: 97 % O2 Device: None (Room air) Oxygen Delivery: 4 LPM   Weight: 83.9 kg (184 lb  "15.5 oz)  Estimated body mass index is 31.75 kg/m  as calculated from the following:    Height as of 23: 1.626 m (5' 4\").    Weight as of this encounter: 83.9 kg (184 lb 15.5 oz).    ECO  GENERAL/CONSTITUTIONAL: No acute distress.  EYES: Pupils are equal, round, and react to light and accommodation. Extraocular movements intact.  No scleral icterus.  ENT/MOUTH: Neck supple. Oropharynx clear, no mucositis.  LYMPH: No anterior cervical, posterior cervical, supraclavicular, axillary or inguinal adenopathy.   RESPIRATORY: Clear to auscultation bilaterally. No crackles or wheezing.   CARDIOVASCULAR: Regular rate and rhythm without murmurs, gallops, or rubs.  GASTROINTESTINAL: No hepatosplenomegaly, masses, or tenderness. The patient has normal bowel sounds. No guarding.  No distention.  MUSCULOSKELETAL: Warm and well-perfused, no cyanosis, clubbing, or edema.  NEUROLOGIC: Cranial nerves II-XII are intact. Alert, oriented, answers questions appropriately.  INTEGUMENTARY: No rashes or jaundice.  GAIT: Steady, does not use assistive device      LABS:  CBC RESULTS:   Recent Labs   Lab Test 23  0456   WBC 7.2   RBC 4.20   HGB 13.4   HCT 40.3   MCV 96   MCH 31.9   MCHC 33.3   RDW 12.5   *       Recent Labs   Lab Test 23  1005 23  0302 23  0554 23  0456 23  2131 23  1926   NA  --   --   --  139  --  142   POTASSIUM  --   --   --  4.1  --  3.7   CHLORIDE  --   --   --  106  --  109*   CO2  --   --   --  20*  --  22   ANIONGAP  --   --   --  13  --  11   * 143*   < > 140*   < > 113*   BUN  --   --   --  18.0  --  17.4   CR  --   --   --  0.79  --  0.82   EDWIGE  --   --   --  8.9  --  8.9    < > = values in this interval not displayed.         PATHOLOGY:      IMAGING:            Thank you for the opportunity to participate in this patient's care.  Please call with any questions.    Gómez Fuentes MD  Hematology/Oncology  [facility]   "

## 2023-11-06 ENCOUNTER — APPOINTMENT (OUTPATIENT)
Dept: PHYSICAL THERAPY | Facility: CLINIC | Age: 78
DRG: 164 | End: 2023-11-06
Attending: HOSPITALIST
Payer: MEDICARE

## 2023-11-06 DIAGNOSIS — I26.99 PULMONARY EMBOLISM (H): Primary | ICD-10-CM

## 2023-11-06 LAB
ERYTHROCYTE [DISTWIDTH] IN BLOOD BY AUTOMATED COUNT: 12.5 % (ref 10–15)
GLUCOSE BLDC GLUCOMTR-MCNC: 104 MG/DL (ref 70–99)
GLUCOSE BLDC GLUCOMTR-MCNC: 114 MG/DL (ref 70–99)
GLUCOSE BLDC GLUCOMTR-MCNC: 116 MG/DL (ref 70–99)
GLUCOSE BLDC GLUCOMTR-MCNC: 119 MG/DL (ref 70–99)
GLUCOSE BLDC GLUCOMTR-MCNC: 137 MG/DL (ref 70–99)
HCT VFR BLD AUTO: 32.8 % (ref 35–47)
HGB BLD-MCNC: 10.9 G/DL (ref 11.7–15.7)
MCH RBC QN AUTO: 32.3 PG (ref 26.5–33)
MCHC RBC AUTO-ENTMCNC: 33.2 G/DL (ref 31.5–36.5)
MCV RBC AUTO: 97 FL (ref 78–100)
PLATELET # BLD AUTO: 106 10E3/UL (ref 150–450)
RBC # BLD AUTO: 3.37 10E6/UL (ref 3.8–5.2)
UFH PPP CHRO-ACNC: 0.54 IU/ML
WBC # BLD AUTO: 5.6 10E3/UL (ref 4–11)

## 2023-11-06 PROCEDURE — 120N000001 HC R&B MED SURG/OB

## 2023-11-06 PROCEDURE — 85520 HEPARIN ASSAY: CPT

## 2023-11-06 PROCEDURE — 85027 COMPLETE CBC AUTOMATED: CPT | Performed by: HOSPITALIST

## 2023-11-06 PROCEDURE — 250N000013 HC RX MED GY IP 250 OP 250 PS 637: Performed by: HOSPITALIST

## 2023-11-06 PROCEDURE — 250N000013 HC RX MED GY IP 250 OP 250 PS 637: Performed by: INTERNAL MEDICINE

## 2023-11-06 PROCEDURE — 250N000011 HC RX IP 250 OP 636: Mod: JZ | Performed by: HOSPITALIST

## 2023-11-06 PROCEDURE — 36415 COLL VENOUS BLD VENIPUNCTURE: CPT

## 2023-11-06 PROCEDURE — 99232 SBSQ HOSP IP/OBS MODERATE 35: CPT | Performed by: HOSPITALIST

## 2023-11-06 PROCEDURE — 97161 PT EVAL LOW COMPLEX 20 MIN: CPT | Mod: GP

## 2023-11-06 RX ORDER — ZONISAMIDE 100 MG/1
200 CAPSULE ORAL EVERY MORNING
Status: DISCONTINUED | OUTPATIENT
Start: 2023-11-06 | End: 2023-11-07 | Stop reason: HOSPADM

## 2023-11-06 RX ORDER — ZONISAMIDE 100 MG/1
100 CAPSULE ORAL AT BEDTIME
Status: DISCONTINUED | OUTPATIENT
Start: 2023-11-06 | End: 2023-11-07 | Stop reason: HOSPADM

## 2023-11-06 RX ADMIN — ZONISAMIDE 100 MG: 100 CAPSULE ORAL at 21:32

## 2023-11-06 RX ADMIN — HEPARIN SODIUM 800 UNITS/HR: 10000 INJECTION, SOLUTION INTRAVENOUS at 08:25

## 2023-11-06 RX ADMIN — FAMOTIDINE 40 MG: 20 TABLET ORAL at 21:32

## 2023-11-06 RX ADMIN — PANTOPRAZOLE SODIUM 20 MG: 20 TABLET, DELAYED RELEASE ORAL at 08:34

## 2023-11-06 RX ADMIN — BENZONATATE 100 MG: 100 CAPSULE ORAL at 08:47

## 2023-11-06 RX ADMIN — LACOSAMIDE 200 MG: 100 TABLET, FILM COATED ORAL at 08:34

## 2023-11-06 RX ADMIN — SENNOSIDES 8.6 MG: 8.6 TABLET, FILM COATED ORAL at 08:34

## 2023-11-06 RX ADMIN — POLYETHYLENE GLYCOL 3350 8 G: 17 POWDER, FOR SOLUTION ORAL at 08:39

## 2023-11-06 RX ADMIN — ZONISAMIDE 200 MG: 100 CAPSULE ORAL at 09:41

## 2023-11-06 RX ADMIN — RIVAROXABAN 15 MG: 15 TABLET, FILM COATED ORAL at 19:46

## 2023-11-06 RX ADMIN — RIVAROXABAN 15 MG: 15 TABLET, FILM COATED ORAL at 09:41

## 2023-11-06 RX ADMIN — SIMVASTATIN 40 MG: 40 TABLET, FILM COATED ORAL at 21:32

## 2023-11-06 RX ADMIN — ONDANSETRON 4 MG: 4 TABLET, ORALLY DISINTEGRATING ORAL at 08:23

## 2023-11-06 RX ADMIN — LACOSAMIDE 200 MG: 100 TABLET, FILM COATED ORAL at 21:32

## 2023-11-06 ASSESSMENT — ACTIVITIES OF DAILY LIVING (ADL)
ADLS_ACUITY_SCORE: 35
ADLS_ACUITY_SCORE: 24

## 2023-11-06 NOTE — PROGRESS NOTES
Pt here with PE and thrombectomy. A&O. VSS. Tele nsr. diabetic diet.  Patient states that she has problems with taking pills, but took her pills whole last night.  Up with sba.  Patient complains of stomach and acid reflux problems ongoing this stay.  She was given antacids this shift.  She stated that she gets short of breath while sleeping, but doesn't want to wear a cpap, wore 1L 02 overnight. Plan:  continue heparin drip, possible switch to orals today.   She is very anxious, agitated at times and yells at staff and family.  Distraction has been a helpful tool.

## 2023-11-06 NOTE — PLAN OF CARE
[place]ia is alert, oriented, sometimes anxious. VSS on room air. Heparin infusing without incident. Anticipate discharge soon on oral anticoagulants.     She reports and exhibits continued frustration with health care system, especially primary care doctors. Offered reassurance and support. Appeared to be most relieved by distraction.    Tolerated ambulating in the hallway.

## 2023-11-06 NOTE — CONSULTS
Patient has .    Eliquis/Xarelto:  $38/mo ($34 total for 3 months at [facility]).  Discharge Pharmacy can dispense 1 mo free.    Amanda Ovalle  Pharmacy Technician/Liaison, Discharge Pharmacy   [phone]  [email or web address]

## 2023-11-06 NOTE — PROGRESS NOTES
Interventional Radiology Progress Note:  Inpatient at [facility]  Date: 2023     Suma Iverson is a 78 year old woman with a history of COVID diagnosed 10/14, anxiety, type 2 diabetes, hypertension, seizure disorder, who was admitted with a ongoing cough that is preventing her from sleeping, congestion, and persistent sore throat with some new SOB with exertion. She was found to have a Saddle PE and small amount of DVT in her left leg.     She is s/p PE thrombectomy, see below:     Large burden pulmonary emboli including saddle and occlusive lobar/segmental components. Significant volume predominantly acute thrombus removed with suction thrombectomy device, with excellent angiographic result and reduction in PASP (41 to 34 mmHg).  Right CFV access hemostasis with Proglide devices.     PULMONARY ARTERIAL PRESSURES (mmHg):  Pre-thrombectomy: 41/5 (mean 20)  Post-thrombectomy: 34/10 (mean 19)    She is being seen in IR follow up today    Interval History: Intermittently weeping from the grief of her cat Cathleen who  in May and she hasn't been much active since then, She was also becoming angry over a variety of topics. She states she isn't SOB but has some left chest pain which she feels is from a new pill she was started on and she doesn't want to go home today.     Physical Exam:   Vitals: Temp:  [97.9  F (36.6  C)-98  F (36.7  C)] 97.9  F (36.6  C)  Pulse:  [70-80] 72  Resp:  [18-20] 20  BP: ()/(56-72) 111/61  SpO2:  [93 %-100 %] 100 %    General:  Stable.  In no acute distress.    Neuro:  A&O x 3. Moves all extremities equally.  Resp:  Normal respirations on room air. Non labored breathing. Equal air entry B/L   Abdomen:  Soft, non-distended, non-tender.  Vascular: right groin procedure site with dressing CDI. Site soft without tenderness.    Labs:  ROUTINE ICU LABS (Last four results)  CMP  Recent Labs   Lab 23  1225 23  0844 23  0440 23  11/04/23  0554 11/04/23  0456 11/03/23  2131 11/03/23 1926 11/03/23  1108   NA  --   --   --   --   --  139  --  142 141   POTASSIUM  --   --   --   --   --  4.1  --  3.7 4.0   CHLORIDE  --   --   --   --   --  106  --  109* 109*   CO2  --   --   --   --   --  20*  --  22 21*   ANIONGAP  --   --   --   --   --  13  --  11 11   * 116* 119* 106*   < > 140*   < > 113* 154*   BUN  --   --   --   --   --  18.0  --  17.4 19.8   CR  --   --   --   --   --  0.79  --  0.82 0.83   GFRESTIMATED  --   --   --   --   --  77  --  74 73   EDWIGE  --   --   --   --   --  8.9  --  8.9 9.3   PROTTOTAL  --   --   --   --   --   --   --   --  6.8   ALBUMIN  --   --   --   --   --   --   --   --  4.3   BILITOTAL  --   --   --   --   --   --   --   --  0.4   ALKPHOS  --   --   --   --   --   --   --   --  86   AST  --   --   --   --   --   --   --   --  18   ALT  --   --   --   --   --   --   --   --  11    < > = values in this interval not displayed.     CBC  Recent Labs   Lab 11/06/23  0707 11/04/23 0456 11/03/23 1926 11/03/23  1349   WBC 5.6 7.2 7.5 8.6   RBC 3.37* 4.20 4.33 4.49   HGB 10.9* 13.4 14.1 14.5   HCT 32.8* 40.3 41.7 43.7   MCV 97 96 96 97   MCH 32.3 31.9 32.6 32.3   MCHC 33.2 33.3 33.8 33.2   RDW 12.5 12.5 12.4 12.6   * 122* 124* 135*     Assessment: Successful PE thrombectomy. Patient stated she is supposed to leave for [place] soon so can't make a follow up appointment in IR in one month. I mentioned a video visit as a possibility.    Plan: Anticoagulation management per hem/onc  -Per IR ok to discharge if hospitalist feels patient is medically stable    Total time spent on the date of the encounter is 30 minutes, including time spent counseling the patient, performing a medically appropriate evaluation, reviewing prior medical history, ordering medications and tests, documenting clinical information in the medical record, and communication of results.    Thanks [facility] Interventional Radiology CNP  ([phone]) (phone [phone])

## 2023-11-06 NOTE — PROGRESS NOTES
[facility]    Medicine Progress Note - Hospitalist Service    Date of Admission:  11/3/2023    Assessment & Plan   Suma Iverson is a 78 year old female who developed cough and shortness of breath.  She was seen in an emergency room in October 14 where a CT of the chest was negative but she tested positive for COVID.  She was given Paxlovid and sent home.  On October 20 she went back to the emergency room with diarrhea was evaluated and sent home.  She will back to the emergency room on October 28 with abdominal pain.  CT showed already diagnosed diverticulosis and fibroids.  Other evaluation was negative.  She was sent home once again.  She then returned yet again to the emergency room complaining of a cough and shortness of breath.  Her vital signs were stable heart rate was a little on the elevated side in the 90s oxygen saturation was 99%.  She appeared tachypneic.  EKG showed sinus rhythm and an incomplete right bundle branch block T wave inversions new in V2 and V3. CT PE showed large saddle embolism with extension into the lobar and segmental branches of all lobes. Severe right heart strain. LLE US showed short segment occlusive deep vein thrombus in duplicated left popliteal vein. No DVT in RLE. IR thrombectomy completed 11/4. DOAC initiated 11/6 AM. Patient has had significant anxiety and intermittent complaints of abdominal discomfort (usually improved with redirection).        Large saddle embolism with extension into the lobar and segmental branches of all lobes  Short segment occlusive deep vein thrombus in duplicated left popliteal vein  Severe right heart strain  S/P mechanical thrombectomy 11/4   Stable on RA as of 11/6.  She was on 1L NC initially. She is comfortable she does not feel short of breath and she is not having any chest pain.  Heparin stopped, xarelto initiated - will monitor 11/6 given hgb decrease 13 --> 10 since admission. No bleeding reported and it is  noted she had mechanical thrombectomy.  Prn analgesia  Echocardiogram as below - shows R sided strain  Follow on tele  Appreciate Hematology consultation - recommend 6 months of anticoagulation  PT consultation  Working towards discharge home with spouse 11/7 pending clinical course and hgb level     Recent COVID-19 infection - 10/14/2023  Resolved. Not requiring special precautions.     Hypertension   BP variable - PTA lisinopril remains held given lower / borderline BP     Obstructive sleep apnea   Continue continuous positive airway pressure      Seizure disorder   PTA regimen resumed 11/4     Type 2 diabetes mellitus   A1c 6.1%  Aspart correction scale   Resume home regimen, if any, upon discharge     Anxiety   PTA regimen resumed, prn valium noted     Of note, patient has been very stressed at times and reports concerns of things like stomach or colon cancer. She reports having had an upper endoscopy this past summer without cancer found and with ulcers resolved. She states colonoscopy is planned for the coming weeks when she gets to [place].  She also endorses significant frustrations with her prior PCPs particularly at [facility].   -- 11/6 - reports abdominal discomfort is resolved (during hospitalist encounter) and her appetite is improved. No indication for GI consultation currently, plus with new DOAC initiation hematology rec no colonoscopy or elective procedure for 3 months.    - If abdominal concern or dysphagia concern arises/returns, consider GI consultation        Diet: Moderate Consistent Carb (60 g CHO per Meal) Diet    DVT Prophylaxis: heparin --> doac  Panda Catheter: Not present  Lines: None     Cardiac Monitoring: ACTIVE order. Indication: PE  Code Status: Full Code      Clinically Significant Risk Factors                # Thrombocytopenia: Lowest platelets = 106 in last 2 days, will monitor for bleeding   # Hypertension: Noted on problem list                   Disposition Plan       Expected Discharge Date: 11/08/2023                    Colton Mcarthur MD  Hospitalist Service  [facility]  Securely message with [employer] (more info)  Text page via [employer] Paging/Directory   ______________________________________________________________________    Interval History   Pt seen and examined. Eating comfortably today and reports relief that her appetite is returned and she has no abdominal discomfort. No fevers or chills. No sob or chest pain.     Physical Exam   Vital Signs: Temp: 97.9  F (36.6  C) Temp src: Oral BP: 111/61 Pulse: 72   Resp: 20 SpO2: 100 % O2 Device: None (Room air) Oxygen Delivery: 1 LPM  Weight: 183 lbs 4.8 oz    Gen: NAD, pleasant  HEENT: EOMI, MMM  Resp: no focal crackles, no wheezes, no increased work of resp  CV: S1S2 heard, reg rhythm, reg rate  Abdo: soft, nontender, nondistended, bowel sounds present  Ext: calves nontender, well perfused  Neuro: aa, conversant, moving ext, CN grossly intact, no facial asymmetry      Medical Decision Making       37 MINUTES SPENT BY ME on the date of service doing chart review, history, exam, documentation & further activities per the note.      Data     I have personally reviewed the following data over the past 24 hrs:    5.6  \   10.9 (L)   / 106 (L)     N/A N/A N/A /  137 (H)   N/A N/A N/A \

## 2023-11-06 NOTE — PROVIDER NOTIFICATION
Paged dr. Baker:    patient said she has a really bad cough. dry thus far. She would like an order for cough medicine? can you order her some robitussin and lozenges?     Answer:  ordered

## 2023-11-06 NOTE — PROGRESS NOTES
11/06/23 0900   Appointment Info   Signing Clinician's Name / Credentials (PT) Esther Olmedo, PT   Living Environment   People in Home spouse   Current Living Arrangements house   Home Accessibility stairs within home   Number of Stairs, Within Home, Primary greater than 10 stairs  (6+6)   Stair Railings, Within Home, Primary railings safe and in good condition;railing on right side (ascending)   Transportation Anticipated family or friend will provide   Living Environment Comments Pt lives in [place]March, where there are no steps at the house. During other months, she lives in [facility].   Self-Care   Usual Activity Tolerance good   Current Activity Tolerance good   Regular Exercise No   Equipment Currently Used at Home none   Fall history within last six months no   General Information   Onset of Illness/Injury or Date of Surgery 11/03/23   Referring Physician Dr. Mcarthur   Patient/Family Therapy Goals Statement (PT) To go home   Pertinent History of Current Problem (include personal factors and/or comorbidities that impact the POC) Pt is a 78 year old female with recent COVID 19 infection, admitted with saddle PE.   Cognition   Affect/Mental Status (Cognition) WFL   Orientation Status (Cognition) oriented x 4   Follows Commands (Cognition) [facility]   Pain Assessment   Patient Currently in Pain No   Range of Motion (ROM)   Range of Motion ROM is WFL   Strength (Manual Muscle Testing)   Strength (Manual Muscle Testing) strength is WF   Bed Mobility   Bed Mobility no deficits identified   Transfers   Transfers no deficits identified   Gait/Stairs (Locomotion)   Comment, (Gait/Stairs) Pt demonstrates ability to ambulates >300' without an AD independently. Pt able to start/stop, change speeds and change directions without LOB. Pt up/down 2+3 steps x 2 reps with handrail modified independent   Balance   Balance Comments Good   Clinical Impression   Criteria for Skilled Therapeutic Intervention Evaluation  only   Clinical Presentation (PT Evaluation Complexity) stable   Clinical Presentation Rationale VSS, pain controlled   Clinical Decision Making (Complexity) low complexity   Risk & Benefits of therapy have been explained evaluation/treatment results reviewed;care plan/treatment goals reviewed;risks/benefits reviewed;current/potential barriers reviewed;participants voiced agreement with care plan;participants included;patient   PT Total Evaluation Time   PT Eval, Low Complexity Minutes (06719) 25   PT Discharge Planning   PT Plan Pt at baseline mobility, no further acute care PT needs, will complete orders   PT Discharge Recommendation (DC Rec) home   PT Rationale for DC Rec Pt at baseline mobility, appropriate to discharge home with .   PT Brief overview of current status Independent   Total Session Time   Total Session Time (sum of timed and untimed services) 25

## 2023-11-07 ENCOUNTER — TELEPHONE (OUTPATIENT)
Dept: OTHER | Facility: CLINIC | Age: 78
End: 2023-11-07
Payer: MEDICARE

## 2023-11-07 VITALS
WEIGHT: 183.3 LBS | DIASTOLIC BLOOD PRESSURE: 66 MMHG | BODY MASS INDEX: 31.46 KG/M2 | RESPIRATION RATE: 18 BRPM | TEMPERATURE: 97.3 F | OXYGEN SATURATION: 97 % | HEART RATE: 73 BPM | SYSTOLIC BLOOD PRESSURE: 104 MMHG

## 2023-11-07 DIAGNOSIS — I26.99 PULMONARY EMBOLISM (H): Primary | ICD-10-CM

## 2023-11-07 LAB
ANION GAP SERPL CALCULATED.3IONS-SCNC: 16 MMOL/L (ref 7–15)
B2 GLYCOPROT1 IGG SERPL IA-ACNC: 1 U/ML
B2 GLYCOPROT1 IGM SERPL IA-ACNC: <2.4 U/ML
BUN SERPL-MCNC: 19.1 MG/DL (ref 8–23)
CALCIUM SERPL-MCNC: 8.4 MG/DL (ref 8.8–10.2)
CARDIOLIPIN IGG SER IA-ACNC: <2 GPL-U/ML
CARDIOLIPIN IGG SER IA-ACNC: NEGATIVE
CARDIOLIPIN IGM SER IA-ACNC: <2 MPL-U/ML
CARDIOLIPIN IGM SER IA-ACNC: NEGATIVE
CHLORIDE SERPL-SCNC: 108 MMOL/L (ref 98–107)
CREAT SERPL-MCNC: 0.93 MG/DL (ref 0.51–0.95)
DEPRECATED HCO3 PLAS-SCNC: 18 MMOL/L (ref 22–29)
EGFRCR SERPLBLD CKD-EPI 2021: 63 ML/MIN/1.73M2
ERYTHROCYTE [DISTWIDTH] IN BLOOD BY AUTOMATED COUNT: 12.9 % (ref 10–15)
GLUCOSE BLDC GLUCOMTR-MCNC: 107 MG/DL (ref 70–99)
GLUCOSE BLDC GLUCOMTR-MCNC: 156 MG/DL (ref 70–99)
GLUCOSE SERPL-MCNC: 152 MG/DL (ref 70–99)
HCT VFR BLD AUTO: 34.6 % (ref 35–47)
HGB BLD-MCNC: 11.6 G/DL (ref 11.7–15.7)
MCH RBC QN AUTO: 32.2 PG (ref 26.5–33)
MCHC RBC AUTO-ENTMCNC: 33.5 G/DL (ref 31.5–36.5)
MCV RBC AUTO: 96 FL (ref 78–100)
PLATELET # BLD AUTO: 142 10E3/UL (ref 150–450)
POTASSIUM SERPL-SCNC: 4 MMOL/L (ref 3.4–5.3)
RBC # BLD AUTO: 3.6 10E6/UL (ref 3.8–5.2)
SODIUM SERPL-SCNC: 142 MMOL/L (ref 135–145)
WBC # BLD AUTO: 7.9 10E3/UL (ref 4–11)

## 2023-11-07 PROCEDURE — 85027 COMPLETE CBC AUTOMATED: CPT | Performed by: HOSPITALIST

## 2023-11-07 PROCEDURE — 250N000011 HC RX IP 250 OP 636: Performed by: HOSPITALIST

## 2023-11-07 PROCEDURE — 250N000013 HC RX MED GY IP 250 OP 250 PS 637: Performed by: HOSPITALIST

## 2023-11-07 PROCEDURE — 99239 HOSP IP/OBS DSCHRG MGMT >30: CPT | Performed by: INTERNAL MEDICINE

## 2023-11-07 PROCEDURE — 36415 COLL VENOUS BLD VENIPUNCTURE: CPT | Performed by: HOSPITALIST

## 2023-11-07 RX ORDER — ONDANSETRON 4 MG/1
4 TABLET, ORALLY DISINTEGRATING ORAL EVERY 6 HOURS PRN
Qty: 30 TABLET | Refills: 0 | Status: SHIPPED | OUTPATIENT
Start: 2023-11-07

## 2023-11-07 RX ORDER — BENZONATATE 100 MG/1
100 CAPSULE ORAL 3 TIMES DAILY PRN
Qty: 30 CAPSULE | Refills: 0 | Status: SHIPPED | OUTPATIENT
Start: 2023-11-07 | End: 2023-11-10

## 2023-11-07 RX ADMIN — LACOSAMIDE 200 MG: 100 TABLET, FILM COATED ORAL at 09:18

## 2023-11-07 RX ADMIN — RIVAROXABAN 15 MG: 15 TABLET, FILM COATED ORAL at 09:18

## 2023-11-07 RX ADMIN — ONDANSETRON 4 MG: 4 TABLET, ORALLY DISINTEGRATING ORAL at 08:02

## 2023-11-07 RX ADMIN — ZONISAMIDE 200 MG: 100 CAPSULE ORAL at 09:18

## 2023-11-07 RX ADMIN — PANTOPRAZOLE SODIUM 20 MG: 20 TABLET, DELAYED RELEASE ORAL at 08:02

## 2023-11-07 ASSESSMENT — ACTIVITIES OF DAILY LIVING (ADL)
ADLS_ACUITY_SCORE: 24
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
## 2023-11-07 NOTE — PLAN OF CARE
SUMMARY: Saddle PE, thrombectomy on 11/4, was on heparin drip- switched the Xarelto today    DATE & TIME: 11/6-11/07/23 Night shift  Cognitive Concerns/ Orientation : Aox4  BEHAVIOR & AGGRESSION TOOL COLOR: Green  ABNL VS/O2: VSS on RA  MOBILITY: SBA w/ gait belt- walked to bathroom several times  PAIN MANAGMENT: Denies  DIET: Mod carb.   BOWEL/BLADDER: Continent. No BM this shift  ABNL LAB/BG: NA  DRAIN/DEVICES: PIV SL  TELEMETRY RHYTHM: NSR  SKIN: Bruising on R side of groin; TESTS/PROCEDURES: Mechanical thrombectomy on 11/4  D/C DATE: Most likely discharge today  OTHER IMPORTANT INFO: On Xarelto       Goal Outcome Evaluation:

## 2023-11-07 NOTE — PLAN OF CARE
Goal Outcome Evaluation:    SUMMARY: Saddle PE, thrombectomy on , was on heparin drip- switched the Xarelto today    DATE & TIME: 23 [id number]   Cognitive Concerns/ Orientation : Aox4  BEHAVIOR & AGGRESSION TOOL COLOR: green  ABNL VS/O2: VSS on room air  MOBILITY: SBA w/ gait belt  PAIN MANAGMENT: Complains of abdominal discomfort/indigestion- denies intervention  DIET: Mod carb. Good appetite  BOWEL/BLADDER: Continent. No BM this shift  ABNL LAB/BG: Hgb 10.9, B  DRAIN/DEVICES: PIV SL  TELEMETRY RHYTHM: NSR  SKIN: Bruising on R side of groin/ perineum from groin site  TESTS/PROCEDURES: Mechanical thrombectomy on   D/C DATE: ? Pending morning labs  OTHER IMPORTANT INFO: none

## 2023-11-07 NOTE — DISCHARGE SUMMARY
[facility]  Hospitalist Discharge Summary      Date of Admission:  11/3/2023  Date of Discharge:  11/7/2023  4:18 PM  Discharging Provider: Kenyon Mcadams DO  Discharge Service: Hospitalist Service    Discharge Diagnoses      Large saddle embolism with extension into the lobar and segmental branches of all lobes  Short segment occlusive deep vein thrombus in duplicated left popliteal vein  Severe right heart strain  S/P mechanical thrombectomy 11/4   Recent COVID-19 infection - 10/14/2023  Hypertension   Obstructive sleep apnea   Seizure disorder  Type 2 diabetes mellitus   Anxiety  Abdominal discomfort suspect dyspepsia vs GERD     Clinically Significant Risk Factors          Follow-ups Needed After Discharge   Follow-up Appointments     Follow-up and recommended labs and tests       Follow up with primary care provider, Libby Moses, within 1-3 weeks,   for hospital follow- up. No follow up labs or test are needed.  Follow up with GI recommended.  Patient to contact and be seen as soon as   possible   Vascular medicine referral placed          Unresulted Labs Ordered in the Past 30 Days of this Admission       No orders found from 10/4/2023 to 11/4/2023.          Discharge Disposition   Discharged to home  Condition at discharge: Stable    Hospital Course   Suma Iverson is a 78 year old female who developed cough and shortness of breath.  She was seen in an emergency room in October 14 where a CT of the chest was negative but she tested positive for COVID.  She was given Paxlovid and sent home.  On October 20 she went back to the emergency room with diarrhea was evaluated and sent home.  She will back to the emergency room on October 28 with abdominal pain.  CT showed already diagnosed diverticulosis and fibroids.  Other evaluation was negative.  She was sent home once again.  She then returned yet again to the emergency room complaining of a cough and shortness of breath.  Her  vital signs were stable heart rate was a little on the elevated side in the 90s oxygen saturation was 99%.  She appeared tachypneic.  EKG showed sinus rhythm and an incomplete right bundle branch block T wave inversions new in V2 and V3. CT PE showed large saddle embolism with extension into the lobar and segmental branches of all lobes. Severe right heart strain. LLE US showed short segment occlusive deep vein thrombus in duplicated left popliteal vein. No DVT in RLE. IR thrombectomy completed 11/4. DOAC initiated 11/6 AM. Patient has had significant anxiety and intermittent complaints of abdominal discomfort (usually improved with redirection).         Large saddle embolism with extension into the lobar and segmental branches of all lobes  Short segment occlusive deep vein thrombus in duplicated left popliteal vein  Severe right heart strain  S/P mechanical thrombectomy 11/4   Stable on RA as of 11/6.  She was on 1L NC initially. She is comfortable she does not feel short of breath and she is not having any chest pain.  Heparin stopped, xarelto initiated - will monitor 11/6 given hgb decrease 13 --> 10 since admission. No bleeding reported and it is noted she had mechanical thrombectomy.  Prn analgesia  Echocardiogram as below - shows R sided strain  Follow on tele  Appreciate Hematology consultation - recommend 6 months of anticoagulation  PT consultation  Working towards discharge home with spouse 11/7 pending clinical course and hgb level     Recent COVID-19 infection - 10/14/2023  Resolved. Not requiring special precautions.     Hypertension   BP variable - PTA lisinopril remains held given lower / borderline BP     Obstructive sleep apnea   Continue continuous positive airway pressure      Seizure disorder   PTA regimen resumed 11/4     Type 2 diabetes mellitus   A1c 6.1%  Aspart correction scale   Resume home regimen, if any, upon discharge     Anxiety   PTA regimen resumed, prn valium noted     Abdominal  "discomfort suspect dyspepsia vs GERD   Of note, patient has been very stressed at times and reports concerns of things like stomach or colon cancer. She complained of intermittent abdominal discomfort that she describes as \"like nausea pain\" and notes she had an upper endoscopy this past summer without cancer found and with ulcers resolved. She states colonoscopy is planned for the coming weeks when she gets to [place].  She also endorses significant frustrations with ALL physician care.  On palpation unable to illicit any complaints  - Added Pepto at discharge  - Instructed to contact [facility] as outpatient.  At this time it was felt patient did not warrant inpatient evaluation as she had no pain on exam and if patient did warrant repeat endoscopy they would hold not proceed without an emergent issue given the new diagnosis of PE      Of note, patient complaining of care here throughout her stay to multiple providers, nursing and staff.  She states we do not communicate and expressed overall dysfunction with ALL of her previous physicians both in and out of the hospital.  She states she does not like her PCP clinic here, the GI team she has seen as an outpatient and the physicians she has tried to establish care with in [place].    I arrived to the patient's room at approximately 12:15 PM on day of discharge and the first thing she told me is she was about to call patient relations at the recommendation of her  as he told her she should call them at noon as she had yet to be seen by a physician that morning.  I tried to discuss with her that this was my first day on and physicians round on patients throughout the day and it is unreasonable to expect a new physician to see a patient by a certain time if there was no prior discussions or emergent issues but she seemed to lack comprehension of this.   I did discuss with her that if she was dissatisfied with her care we could discharge her immediately and she could go " "to another hospital for further evaluation as she was medically stable for discharge at that time but she stated she did not want that and she does not like the care at [facility] as it was the nearest hospital to here and that she had already been to [facility] twice \"without any answers\"        Consultations This Hospital Stay   PHARMACY IP CONSULT  INTERVENTIONAL RADIOLOGY ADULT/PEDS IP CONSULT  PHARMACY LIAISON FOR MEDICATION COVERAGE CONSULT  HEMATOLOGY & ONCOLOGY IP CONSULT  PHYSICAL THERAPY ADULT IP CONSULT  CARE MANAGEMENT / SOCIAL WORK IP CONSULT    Code Status   Full Code    Time Spent on this Encounter   I, Kenyon Mcadams DO, personally saw the patient today and spent greater than 30 minutes discharging this patient.       Kenyon Mcadams DO  [place] [address] [facility]  [address] [place] [address]  Phone: [phone]  ______________________________________________________________________    Physical Exam   Vital Signs: Temp: 97.3  F (36.3  C) Temp src: Axillary BP: 104/66 Pulse: 73   Resp: 18 SpO2: 97 % O2 Device: None (Room air)    Weight: 183 lbs 4.8 oz  General Appearance: Resting comfortably. NAD   Respiratory: Clear to auscultation.  No respiratory distress  Cardiovascular: RRR.  No obvious murmurs  GI: Soft.  Non-distended.  Bowel sounds noted.  Non-tender to deep palpation   Skin: No obvious rashes or cyanosis  Other: Alert.  Moving all extremities grossly         Primary Care Physician   Libby Moses    Discharge Orders      Vascular Medicine Referral      Reason for your hospital stay    Saddle PE     Follow-up and recommended labs and tests     Follow up with primary care provider, Libby Moses, within 1-3 weeks, for hospital follow- up. No follow up labs or test are needed.  Follow up with GI recommended.  Patient to contact and be seen as soon as possible   Vascular medicine referral placed     Activity    Your activity upon discharge: activity as " tolerated     Diet    Follow this diet upon discharge: Orders Placed This Encounter      Moderate Consistent Carb (60 g CHO per Meal) Diet       Significant Results and Procedures   Most Recent 3 CBC's:  Recent Labs   Lab Test 11/07/23  1222 11/06/23  0707 11/04/23  0456   WBC 7.9 5.6 7.2   HGB 11.6* 10.9* 13.4   MCV 96 97 96   * 106* 122*     Most Recent 3 BMP's:  Recent Labs   Lab Test 11/07/23  1311 11/07/23  0819 11/06/23 2136 11/05/23 2037 11/05/23  1931 11/04/23  0554 11/04/23 0456 11/03/23 2131 11/03/23  1926   NA  --   --   --   --  142  --  139  --  142   POTASSIUM  --   --   --   --  4.0  --  4.1  --  3.7   CHLORIDE  --   --   --   --  108*  --  106  --  109*   CO2  --   --   --   --  18*  --  20*  --  22   BUN  --   --   --   --  19.1  --  18.0  --  17.4   CR  --   --   --   --  0.93  --  0.79  --  0.82   ANIONGAP  --   --   --   --  16*  --  13  --  11   EDWIGE  --   --   --   --  8.4*  --  8.9  --  8.9   * 107* 104*   < > 152*   < > 140*   < > 113*    < > = values in this interval not displayed.     Most Recent 2 LFT's:  Recent Labs   Lab Test 11/03/23  1108 10/28/23  1733   AST 18 18   ALT 11 10   ALKPHOS 86 86   BILITOTAL 0.4 0.5   ,   Results for orders placed or performed during the hospital encounter of 11/03/23   IR Pulmonary Angiogram Bilateral    Narrative    [facility]   LOCATION: [facility]  DATE: 11/4/2023    PROCEDURE:  1. US-GUIDED ACCESS RIGHT COMMON FEMORAL VEIN  2. RIGHT ILIOCAVAL VENOGRAPHY  3. MAIN PULMONARY ANGIOGRAPHY  4. MAIN PULMONARY MANOMETRY  5. SELECTIVE LEFT PULMONARY ANGIOGRAPHY  6. SELECTIVE RIGHT PULMONARY ANGIOGRAPHY  7. LEFT PULMONARY MECHANICAL THROMBECTOMY (EXTIRPATION OF MATTER)  8. RIGHT PULMONARY MECHANICAL THROMBECTOMY (EXTIRPATION OF MATTER)  9. VASCULAR CLOSURE DEVICE USE    INTERVENTIONAL RADIOLOGIST: Sergio Washington MD    INDICATION: 78-year-old female with large volume pulmonary emboli  including saddle with imaging and  clinical evidence of right heart  strain compatible with intermediate high risk/submassive risk  stratification. IR was consulted for pulmonary angiogram and possible  thrombectomy.    CONSENT: The risks, benefits and alternatives of the procedure were  discussed with the patient or representative in detail. All questions  were answered. Informed consent was given to proceed with the  procedure.    MODERATE SEDATION: Versed 4 mg IV; Fentanyl 250 mcg IV. During the  time out, immediately prior to the administration of medications, the  patient was reassessed for adequacy to receive conscious sedation.   Under physician supervision, Versed and fentanyl were administered for  moderate sedation. Pulse oximetry, heart rate and blood pressure were  continuously monitored by an independent trained observer. The  physician spent 81 minutes of face-to-face sedation time with the  patient.    CONTRAST: 76 mL of Isovue-300 intravascular.  ANTIBIOTICS: None.  ADDITIONAL MEDICATIONS: None.    FLUOROSCOPIC TIME: 24.1 minutes.  RADIATION DOSE: Air Kerma: 195.4 mGy.    COMPLICATIONS: No immediate complications.    UNIVERSAL PROTOCOL: The operative site was marked and any prior  imaging was reviewed. Required items including blood products,  implants, devices and special equipment was made available. Patient  identity was confirmed either verbally, with demographic information,  hospital assigned identification or other identification markers. A  timeout was performed immediately prior to the procedure.    STERILE BARRIER TECHNIQUE: Maximum sterile barrier technique was used.  Cutaneous antisepsis was performed at the operative site with  application of 2% chlorhexidine and large sterile drape. Prior to the  procedure, the  and assistant performed hand hygiene and wore  hat, mask, sterile gown, and sterile gloves during the entire  procedure.    PROCEDURE:    Patient was positioned supine on the procedure table, and  the  bilateral groins were prepped and draped in the usual sterile fashion.  Using real-time ultrasound guidance, access was achieved into the side  common femoral vein and conversion was made for a 7 [place] vascular  sheath. A permanent image was stored. Utilizing preclose technique,  two Perclose suture devices were deployed at the venous access site,  and the 7 [place] sheath was reinserted. Right iliocaval venography was  performed through the venous access sheath.    Under live fluoroscopy, a 7 [place] angled pigtail catheter was  manipulated through the right heart and into the main pulmonary  artery. Main pulmonary artery pressure measurements were obtained.  Main pulmonary angiography was performed.    An angled catheter and stiff Glidewire were manipulated into a  lingular left pulmonary artery. Over a short-flop stiff Amplatz wire,  the sheath was exchanged for a 24 [place] Inari Intri24 sheath and  advanced into the suprarenal inferior vena cava. Through the sheath a  24 [place] Inari [id number] device was advanced into the central  left pulmonary artery. Mechanical suction thrombectomy was performed  with extirpation of matter using multiple passes. Repeat left  pulmonary angiogram was obtained. The angled catheter and wire were  used to select a distal left lower lobe pulmonary artery. Over the  stiff Amplatz wire, a 20 [place] Inari T20 curve catheter was advanced  coaxially into the central left lower lobe pulmonary artery.  Mechanical suction thrombectomy was performed with extirpation of  matter using multiple passes. Post thrombectomy left pulmonary  angiography was performed.    An angled catheter and stiff Glidewire were manipulated into the  distal right interlobar pulmonary artery. Over the Amplatz wire, the  Inari [id number] was advanced into the central right interlobar  pulmonary artery. Mechanical suction thrombectomy was performed with  extirpation of matter using multiple passes.  Post-thrombectomy right  pulmonary angiogram was obtained.    The pigtail cathter was advanced over the wire into the main pulmonary  artery. Post-thrombectomy main pulmonary artery pressure measurements  were obtained.    The catheters and sheath were removed and hemostasis achieved via the  Perclose suture devices and additional manual compression.    FINDINGS:  1. Ultrasound demonstrates a patent and fully compressible right  common femoral vein. A permanent image was stored.  2. Right iliocaval venography demonstrates patency without evidence of  thrombus.  3. Initial pulmonary angiography demonstrates large burden bilateral  central and lobar pulmonary emboli with saddle, and corresponding to  recent CT-PE study.  4. Following mechanical suction thrombectomy, completion left  pulmonary angiogram shows near-complete resolution of central  pulmonary emboli burden and significantly improved perfusion  throughout the left lung. Persistent trace volume segmental and  subsegmental emboli in the inferior lingula.  5. Following mechanical suction thrombectomy, completion right  pulmonary angiogram shows near-complete resolution of central  pulmonary emboli and significantly improved perfusion throughout the  right lung. Persistent trace volume segmental and subsegmental emboli  in the right middle lobe.  6. External thrombus examination demonstrates large volume  predominantly acute thrombus.    PULMONARY ARTERIAL PRESSURES (mmHg):  Pre-thrombectomy: 41/5 (mean 20)  Post-thrombectomy: 34/10 (mean 19)      Impression    IMPRESSION:   Pulmonary angiogram and successful bilateral mechanical suction  thrombectomy, with significantly improved angiographic result and  decreased pulmonary artery systolic pressures as described.    COURTNEY BURTON MD         SYSTEM ID:  [id number]   Echocardiogram Complete     Value    LVEF  60-65%    Narrative    [id number]  [id number]  [id number]  [id number]^LILLIAN^MAT^DARLENE     [facility]  [facility]  Echocardiography Laboratory  [address] [place] [address]     Name: KERI COLEMAN  MRN: [id number]  : 1945  Study Date: 2023 08:47 AM  Age: 78 yrs  Gender: Female  Patient Location: [facility]  Reason For Study: Pulmonary Emboli  Ordering Physician: MAT SNYDER  Referring Physician: JUVENAL CARDENAS  Performed By: Char Singh     BSA: 1.9 m2  Height: 64 in  Weight: 184 lb  HR: 81  BP: 146/88 mmHg  ______________________________________________________________________________  Procedure  Complete Echo Adult. Optison (NDC #7460-5301) given intravenously.  ______________________________________________________________________________  Interpretation Summary     The right ventricular systolic function is moderate to severely reduced.  The right ventricle is moderately dilated.  Flattened septum is consistent with RV pressure overload.  Left ventricular systolic function is normal.  The visual ejection fraction is 60-65%.  The left ventricle is normal in size.  Ascending aorta dilatation is present.(38m) normal 37  Doppler interrogation does not demonstrate signficant stenosis or  insufficiency involving cardiac valves     No old studies for comparison  ______________________________________________________________________________  Left Ventricle  The left ventricle is normal in size. There is normal left ventricular wall  thickness. Left ventricular systolic function is normal. The visual ejection  fraction is 60-65%. Grade I or early diastolic dysfunction. Flattened septum  is consistent with RV pressure overload. There is no thrombus seen in the left  ventricle.     Right Ventricle  The right ventricle is moderately dilated. There is normal right ventricular  wall thickness. The right ventricular systolic function is moderate to  severely reduced. There is no mass or thrombus in the right ventricle.     Atria  Normal left atrial size. Right atrial size is normal.  There is no atrial shunt  seen. The left atrial appendage is not well visualized.     Mitral Valve  The mitral valve leaflets appear normal. There is no evidence of stenosis,  fluttering, or prolapse. There is no mitral regurgitation noted. There is no  mitral valve stenosis.     Tricuspid Valve  Normal tricuspid valve. The right ventricular systolic pressure is  approximated at 22.3 mmHg plus the right atrial pressure. Right ventricle  systolic pressure estimate normal. There is mild (1+) tricuspid regurgitation.  There is no tricuspid stenosis.     Aortic Valve  The aortic valve is trileaflet. No aortic regurgitation is present. No aortic  stenosis is present.     Pulmonic Valve  Normal pulmonic valve. There is no pulmonic valvular regurgitation. There is  no pulmonic valvular stenosis.     Vessels  The aortic root is normal size. Ascending aorta dilatation is present. The  inferior vena cava is normal.     Pericardium  The pericardium appears normal. There is no pleural effusion.     Rhythm  Sinus rhythm was noted.  ______________________________________________________________________________  MMode/2D Measurements & Calculations  IVSd: 1.0 cm     LVIDd: 3.7 cm  LVIDs: 2.6 cm  LVPWd: 1.0 cm  FS: 28.5 %  LV mass(C)d: 112.5 grams  LV mass(C)dI: 59.6 grams/m2  Ao root diam: 3.7 cm  LA dimension: 2.4 cm  asc Aorta Diam: 3.8 cm  LA/Ao: 0.67  LVOT diam: 2.0 cm  LVOT area: 3.1 cm2  Ao root diam index Ht(cm/m): 2.3  Ao root diam index BSA (cm/m2): 1.9  Asc Ao diam index BSA (cm/m2): 2.0  Asc Ao diam index Ht(cm/m): 2.3  LA Volume (BP): 37.3 ml     LA Volume Index (BP): 19.7 ml/m2  RWT: 0.54  TAPSE: 1.2 cm     Doppler Measurements & Calculations  MV E max leonardo: 48.2 cm/sec  MV A max leonardo: 93.3 cm/sec  MV E/A: 0.52  MV dec time: 0.28 sec  Ao V2 max: 120.0 cm/sec  Ao max P.0 mmHg  Ao V2 mean: 82.1 cm/sec  Ao mean PG: 3.0 mmHg  Ao V2 VTI: 21.5 cm  HERIBERTO(I,D): 2.2 cm2  HERIBERTO(V,D): 2.5 cm2  LV V1 max PG: 3.8 mmHg  LV V1 max:  96.2 cm/sec  LV V1 VTI: 15.4 cm  SV(LVOT): 47.9 ml  SI(LVOT): 25.4 ml/m2  PA acc time: 0.11 sec  TR max norm: 236.1 cm/sec  TR max P.3 mmHg  AV Norm Ratio (DI): 0.80  HERIBERTO Index (cm2/m2): 1.2  E/E' av.9     Lateral E/e': 6.8  Medial E/e': 7.0  RV S Norm: 12.4 cm/sec     ______________________________________________________________________________  Report approved by: Dr. Zeus Peoples 2023 09:48 AM             Discharge Medications   Current Discharge Medication List        START taking these medications    Details   benzonatate (TESSALON) 100 MG capsule Take 1 capsule (100 mg) by mouth 3 times daily as needed for cough  Qty: 30 capsule, Refills: 0    Comments: Future refills by PCP Dr. Libby Moses with phone number [phone].  Associated Diagnoses: Saddle embolus of pulmonary artery with acute cor pulmonale, unspecified chronicity (H)      bismuth subsalicylate (PEPTO BISMOL) 262 MG/15ML suspension Take 15 mLs by mouth every 6 hours as needed for indigestion  Qty: 473 mL, Refills: 0    Comments: Future refills by PCP Dr. Libby Moses with phone number [phone].  Associated Diagnoses: Saddle embolus of pulmonary artery with acute cor pulmonale, unspecified chronicity (H)      ondansetron (ZOFRAN ODT) 4 MG ODT tab Take 1 tablet (4 mg) by mouth every 6 hours as needed for nausea or vomiting  Qty: 30 tablet, Refills: 0    Comments: Future refills by PCP Dr. Libby Moses with phone number [phone].  Associated Diagnoses: Saddle embolus of pulmonary artery with acute cor pulmonale, unspecified chronicity (H)      Rivaroxaban ANTICOAGULANT 15 & 20 MG TBPK Starter Therapy Pack Take 15 mg by mouth 2 times daily (with meals) for 21 days, THEN 20 mg daily with food for 39 days.  Qty: 51 each, Refills: 0    Comments: Future refills by PCP Dr. Libby Moses with phone number [phone].  Associated Diagnoses: Saddle embolus of pulmonary artery with acute cor pulmonale, unspecified chronicity (H)            CONTINUE these medications which have NOT CHANGED    Details   albuterol (PROAIR HFA/PROVENTIL HFA/VENTOLIN HFA) 108 (90 Base) MCG/ACT inhaler Inhale 2 puffs into the lungs every 6 hours as needed for shortness of breath, wheezing or cough  Qty: 18 g, Refills: 0    Comments: Pharmacy may dispense brand covered by insurance (Proair, or proventil or ventolin or generic albuterol inhaler)      diazepam (VALIUM) 5 MG tablet Take 5 mg by mouth 2 times daily as needed for anxiety      famotidine (PEPCID) 40 MG tablet Take 40 mg by mouth at bedtime      lacosamide (VIMPAT) 200 MG TABS tablet Take 200 mg by mouth 2 times daily      pantoprazole (PROTONIX) 20 MG EC tablet Take 20 mg by mouth daily      simvastatin (ZOCOR) 40 MG tablet Take 40 mg by mouth at bedtime      vitamin D2 (ERGOCALCIFEROL) 15169 units (1250 mcg) capsule Take 50,000 Units by mouth once a week      zonisamide (ZONEGRAN) 100 MG capsule Take by mouth 2 times daily 2 capsules in the morning and 1 capsule in the evening           STOP taking these medications       lisinopril (ZESTRIL) 10 MG tablet Comments:   Reason for Stopping:             Allergies   Allergies   Allergen Reactions    Levetiracetam Anxiety and Other (See Comments)     Other Reaction(s): Anxiety, Irritable

## 2023-11-07 NOTE — PLAN OF CARE
VSS. Tele: SR. IV heparin stopped and PO Xarelto started. BG stable this AM. Weaned to RA this morning. Appropriate behaviors today. Pt will likely discharge tomorrow. Pt will transfer to station 66 when room is cleaned.

## 2023-11-07 NOTE — PROGRESS NOTES
Interventional Radiology Progress Note:  Inpatient at [facility]  Date: November 7, 2023     Suma Iverson is a 78 year old woman with a history of COVID diagnosed 10/14, anxiety, type 2 diabetes, hypertension, seizure disorder, who was admitted with a ongoing cough that is preventing her from sleeping, congestion, and persistent sore throat with some new SOB with exertion. She was found to have a Saddle PE and small amount of DVT in her left leg.      She is s/p PE thrombectomy, see below:      Large burden pulmonary emboli including saddle and occlusive lobar/segmental components. Significant volume predominantly acute thrombus removed with suction thrombectomy device, with excellent angiographic result and reduction in PASP (41 to 34 mmHg).  Right CFV access hemostasis with Proglide devices.      PULMONARY ARTERIAL PRESSURES (mmHg):  Pre-thrombectomy: 41/5 (mean 20)  Post-thrombectomy: 34/10 (mean 19)     She is being seen in IR follow up today     Interval History: Feeling better today. No SOB but has an occasional dry, hacking cough that Payton feels is from COVID. Minor L chest pain still. Good chance she is going home today.      Physical Exam:   Temp:  [97.3  F (36.3  C)-97.9  F (36.6  C)] 97.3  F (36.3  C)  Pulse:  [73-84] 73  Resp:  [18] 18  BP: (104-130)/(66-70) 104/66  SpO2:  [96 %-97 %] 97 %    General: Pleasant, labile between weeping to happy, emotionally appears more stable today. In no acute distress.    Neuro:  A&O x 3. Moves all extremities equally.  Resp:  Normal respirations on room air. Non labored breathing. Equal air entry B/L   Abdomen:  Soft, non-distended, non-tender.  Vascular: right groin procedure site without dressing CDI. Site soft without tenderness. Ecchymosis around the incision     Labs:  ROUTINE ICU LABS (Last four results)  CMP  Recent Labs   Lab 11/07/23  1311 11/07/23  0819 11/06/23  2136 11/06/23  1801 11/05/23  2037 11/05/23  1931 11/04/23  0554  11/04/23 0456 11/03/23 2131 11/03/23 1926 11/03/23  1108   NA  --   --   --   --   --  142  --  139  --  142 141   POTASSIUM  --   --   --   --   --  4.0  --  4.1  --  3.7 4.0   CHLORIDE  --   --   --   --   --  108*  --  106  --  109* 109*   CO2  --   --   --   --   --  18*  --  20*  --  22 21*   ANIONGAP  --   --   --   --   --  16*  --  13  --  11 11   * 107* 104* 114*   < > 152*   < > 140*   < > 113* 154*   BUN  --   --   --   --   --  19.1  --  18.0  --  17.4 19.8   CR  --   --   --   --   --  0.93  --  0.79  --  0.82 0.83   GFRESTIMATED  --   --   --   --   --  63  --  77  --  74 73   EDWIGE  --   --   --   --   --  8.4*  --  8.9  --  8.9 9.3   PROTTOTAL  --   --   --   --   --   --   --   --   --   --  6.8   ALBUMIN  --   --   --   --   --   --   --   --   --   --  4.3   BILITOTAL  --   --   --   --   --   --   --   --   --   --  0.4   ALKPHOS  --   --   --   --   --   --   --   --   --   --  86   AST  --   --   --   --   --   --   --   --   --   --  18   ALT  --   --   --   --   --   --   --   --   --   --  11    < > = values in this interval not displayed.     CBC  Recent Labs   Lab 11/07/23  1222 11/06/23  0707 11/04/23 0456 11/03/23 1926   WBC 7.9 5.6 7.2 7.5   RBC 3.60* 3.37* 4.20 4.33   HGB 11.6* 10.9* 13.4 14.1   HCT 34.6* 32.8* 40.3 41.7   MCV 96 97 96 96   MCH 32.2 32.3 31.9 32.6   MCHC 33.5 33.2 33.3 33.8   RDW 12.9 12.5 12.5 12.4   * 106* 122* 124*     Assessment: Successful PE thrombectomy. Patient stated she is supposed to leave for [place] soon so can't make a follow up appointment in IR in one month. I mentioned a video visit as a possibility.     Plan: Anticoagulation management per hem/onc  -Per IR ok to discharge if hospitalist feels patient is medically stable  -IR will see patient in one month for an US and clinic visit if she is not in [place]    Total time spent on the date of the encounter is 25 minutes, including time spent counseling the patient, performing a medically  appropriate evaluation, reviewing prior medical history, ordering medications and tests, documenting clinical information in the medical record, and communication of results.    Thanks [facility] Interventional Radiology CNP ([phone]) (phone [phone])

## 2023-11-07 NOTE — PROGRESS NOTES
Discharge    Patient discharged to home via WC with - Marilee.  Care plan note: Patient had VSS; denied any chest pain and SOB. Patient remains on RA. Complained of cough- will be going home on tessalon pearls and Xarelto for saddle PE. IV access taken out and tip intact. Walked around unit well. All discharge instructions explained to patient and all questions answered. Talked to  about discharge as well.     Listed belongings gathered and given to patient (including from security/pharmacy). Yes  Care Plan and Patient education resolved: Yes  Prescriptions if needed, hard copies sent with patient  NA  Medication Bin checked and emptied on discharge Yes  SW/care coordinator/charge RN aware of discharge: Yes

## 2023-11-07 NOTE — TELEPHONE ENCOUNTER
Referral received via [facility] on 11/7/23.    Pt referred to [facility] by Kenyon Mcadams DO for saddle PE.     Pt needs to be scheduled for d dimer and NEW VASCULAR PATIENT consult with vascular medicine.  Will route to scheduling to coordinate an appointment at next available.    Appt note:  Pt referred to [facility] by Kenyon Mcadams DO for saddle PE. BLE venous US and CT chest PE in [facility] on 11/3/23. D dimer prior.     Jen GARCIA, RN    [facility]  Office: [phone]  Fax: [phone]

## 2023-11-09 ENCOUNTER — PATIENT OUTREACH (OUTPATIENT)
Dept: CARE COORDINATION | Facility: CLINIC | Age: 78
End: 2023-11-09
Payer: MEDICARE

## 2023-11-09 NOTE — TELEPHONE ENCOUNTER
Patient leaving for [place] on 11/18. No openings available with Medicine prior. Can patient be accommodated?    Please call patients spouse to schedule.    Demarcus

## 2023-11-09 NOTE — TELEPHONE ENCOUNTER
Left voicemail with instructions for patient to call back to schedule their appointment(s)    November 9, 2023 , 8:43 AM

## 2023-11-09 NOTE — TELEPHONE ENCOUNTER
Patient will need to be offered the next available opening if there is no openings prior to them leaving.     Also, scheduling should be discussing with patients we have a vascular medicine provider at [place] and at [facility] as another option if we cannot accommodate them at [facility] and provide contact information for patient to call those clinics to see if there is a sooner opening when appropriate.     Jen GARCIA, RN    [facility]  Office: [phone]  Fax: [phone]

## 2023-11-09 NOTE — TELEPHONE ENCOUNTER
Future Appointments   Date Time Provider Department Center   11/10/2023 10:30 AM Stephenie Zacarias MD [facility]   11/10/2023 11:15 AM  LAB [facility]   11/21/2023  8:10 AM Bentley Panda MD [facility]

## 2023-11-10 ENCOUNTER — OFFICE VISIT (OUTPATIENT)
Dept: FAMILY MEDICINE | Facility: CLINIC | Age: 78
End: 2023-11-10
Payer: MEDICARE

## 2023-11-10 VITALS
HEART RATE: 80 BPM | TEMPERATURE: 98.5 F | SYSTOLIC BLOOD PRESSURE: 122 MMHG | BODY MASS INDEX: 32.17 KG/M2 | OXYGEN SATURATION: 98 % | WEIGHT: 188.4 LBS | HEIGHT: 64 IN | RESPIRATION RATE: 16 BRPM | DIASTOLIC BLOOD PRESSURE: 70 MMHG

## 2023-11-10 DIAGNOSIS — I10 ESSENTIAL HYPERTENSION: ICD-10-CM

## 2023-11-10 DIAGNOSIS — I26.02 SADDLE EMBOLUS OF PULMONARY ARTERY WITH ACUTE COR PULMONALE, UNSPECIFIED CHRONICITY (H): Primary | ICD-10-CM

## 2023-11-10 DIAGNOSIS — Z23 HIGH PRIORITY FOR 2019-NCOV VACCINE: ICD-10-CM

## 2023-11-10 DIAGNOSIS — R09.81 NASAL CONGESTION: ICD-10-CM

## 2023-11-10 DIAGNOSIS — R05.3 CHRONIC COUGH: ICD-10-CM

## 2023-11-10 LAB — D DIMER PPP FEU-MCNC: 1.21 UG/ML FEU (ref 0–0.5)

## 2023-11-10 PROCEDURE — 99495 TRANSJ CARE MGMT MOD F2F 14D: CPT | Mod: 25 | Performed by: GENERAL PRACTICE

## 2023-11-10 PROCEDURE — 91320 SARSCV2 VAC 30MCG TRS-SUC IM: CPT | Performed by: GENERAL PRACTICE

## 2023-11-10 PROCEDURE — 85379 FIBRIN DEGRADATION QUANT: CPT | Performed by: GENERAL PRACTICE

## 2023-11-10 PROCEDURE — 90480 ADMN SARSCOV2 VAC 1/ONLY CMP: CPT | Performed by: GENERAL PRACTICE

## 2023-11-10 PROCEDURE — 36415 COLL VENOUS BLD VENIPUNCTURE: CPT | Performed by: GENERAL PRACTICE

## 2023-11-10 RX ORDER — BENZONATATE 100 MG/1
100 CAPSULE ORAL 3 TIMES DAILY PRN
Qty: 30 CAPSULE | Refills: 4 | Status: SHIPPED | OUTPATIENT
Start: 2023-11-10 | End: 2024-09-18

## 2023-11-10 RX ORDER — RESPIRATORY SYNCYTIAL VIRUS VACCINE 120MCG/0.5
0.5 KIT INTRAMUSCULAR ONCE
Qty: 1 EACH | Refills: 0 | Status: CANCELLED | OUTPATIENT
Start: 2023-11-10 | End: 2023-11-10

## 2023-11-10 ASSESSMENT — PATIENT HEALTH QUESTIONNAIRE - PHQ9
SUM OF ALL RESPONSES TO PHQ QUESTIONS 1-9: 15
SUM OF ALL RESPONSES TO PHQ QUESTIONS 1-9: 15
10. IF YOU CHECKED OFF ANY PROBLEMS, HOW DIFFICULT HAVE THESE PROBLEMS MADE IT FOR YOU TO DO YOUR WORK, TAKE CARE OF THINGS AT HOME, OR GET ALONG WITH OTHER PEOPLE: SOMEWHAT DIFFICULT

## 2023-11-10 ASSESSMENT — PAIN SCALES - GENERAL: PAINLEVEL: NO PAIN (0)

## 2023-11-10 NOTE — PROGRESS NOTES
Clinic Care Coordination Contact  [facility]: Post-Discharge Note  SITUATION                                                      Admission:    Admission Date: 11/03/23   Reason for Admission: Large saddle embolism with extension into the lobar and segmental branches of all lobes  Short segment occlusive deep vein thrombus in duplicated left popliteal vein  Severe right heart strain  S/P mechanical thrombectomy 11/4   Recent COVID-19 infection - 10/14/2023  Hypertension   Obstructive sleep apnea   Seizure disorder  Type 2 diabetes mellitus   Anxiety  Abdominal discomfort suspect dyspepsia vs GERD  Discharge:   Discharge Date: 11/07/23  Discharge Diagnosis: Large saddle embolism with extension into the lobar and segmental branches of all lobes  Short segment occlusive deep vein thrombus in duplicated left popliteal vein  Severe right heart strain  S/P mechanical thrombectomy 11/4   Recent COVID-19 infection - 10/14/2023  Hypertension   Obstructive sleep apnea   Seizure disorder  Type 2 diabetes mellitus   Anxiety  Abdominal discomfort suspect dyspepsia vs GERD    BACKGROUND                                                      Per hospital discharge summary and inpatient provider notes:    Hospital Course  Suma Iverson is a 78 year old female who developed cough and shortness of breath.  She was seen in an emergency room in October 14 where a CT of the chest was negative but she tested positive for COVID.  She was given Paxlovid and sent home.  On October 20 she went back to the emergency room with diarrhea was evaluated and sent home.  She will back to the emergency room on October 28 with abdominal pain.  CT showed already diagnosed diverticulosis and fibroids.  Other evaluation was negative.  She was sent home once again.  She then returned yet again to the emergency room complaining of a cough and shortness of breath.  Her vital signs were stable heart rate was a little on the elevated side in the 90s  oxygen saturation was 99%.  She appeared tachypneic.  EKG showed sinus rhythm and an incomplete right bundle branch block T wave inversions new in V2 and V3. CT PE showed large saddle embolism with extension into the lobar and segmental branches of all lobes. Severe right heart strain. LLE US showed short segment occlusive deep vein thrombus in duplicated left popliteal vein. No DVT in RLE. IR thrombectomy completed 11/4. DOAC initiated 11/6 AM. Patient has had significant anxiety and intermittent complaints of abdominal discomfort (usually improved with redirection).         Large saddle embolism with extension into the lobar and segmental branches of all lobes  Short segment occlusive deep vein thrombus in duplicated left popliteal vein  Severe right heart strain  S/P mechanical thrombectomy 11/4   Stable on RA as of 11/6.  She was on 1L NC initially. She is comfortable she does not feel short of breath and she is not having any chest pain.  Heparin stopped, xarelto initiated - will monitor 11/6 given hgb decrease 13 --> 10 since admission. No bleeding reported and it is noted she had mechanical thrombectomy.  Prn analgesia  Echocardiogram as below - shows R sided strain  Follow on tele  Appreciate Hematology consultation - recommend 6 months of anticoagulation  PT consultation  Working towards discharge home with spouse 11/7 pending clinical course and hgb level     Recent COVID-19 infection - 10/14/2023  Resolved. Not requiring special precautions.     Hypertension   BP variable - PTA lisinopril remains held given lower / borderline BP     Obstructive sleep apnea   Continue continuous positive airway pressure      Seizure disorder   PTA regimen resumed 11/4     Type 2 diabetes mellitus   A1c 6.1%  Aspart correction scale   Resume home regimen, if any, upon discharge     Anxiety   PTA regimen resumed, prn valium noted     Abdominal discomfort suspect dyspepsia vs GERD   Of note, patient has been very stressed at  "times and reports concerns of things like stomach or colon cancer. She complained of intermittent abdominal discomfort that she describes as \"like nausea pain\" and notes she had an upper endoscopy this past summer without cancer found and with ulcers resolved. She states colonoscopy is planned for the coming weeks when she gets to [place].  She also endorses significant frustrations with ALL physician care.  On palpation unable to illicit any complaints  - Added Pepto at discharge  - Instructed to contact [place] [facility] as outpatient.  At this time it was felt patient did not warrant inpatient evaluation as she had no pain on exam and if patient did warrant repeat endoscopy they would hold not proceed without an emergent issue given the new diagnosis of PE        Of note, patient complaining of care here throughout her stay to multiple providers, nursing and staff.  She states we do not communicate and expressed overall dysfunction with ALL of her previous physicians both in and out of the hospital.  She states she does not like her PCP clinic here, the GI team she has seen as an outpatient and the physicians she has tried to establish care with in [place].    I arrived to the patient's room at approximately 12:15 PM on day of discharge and the first thing she told me is she was about to call patient relations at the recommendation of her  as he told her she should call them at noon as she had yet to be seen by a physician that morning.  I tried to discuss with her that this was my first day on and physicians round on patients throughout the day and it is unreasonable to expect a new physician to see a patient by a certain time if there was no prior discussions or emergent issues but she seemed to lack comprehension of this.   I did discuss with her that if she was dissatisfied with her care we could discharge her immediately and she could go to another hospital for further evaluation as she was medically stable for " "discharge at that time but she stated she did not want that and she does not like the care at [facility] as it was the nearest hospital to here and that she had already been to [facility] twice \"without any answers\"     ASSESSMENT           Discharge Assessment  How are you doing now that you are home?: \"My breathing is okay except when I start coughing, sometimes hard to catch my breath but not bad.\"  How are your symptoms? (Red Flag symptoms escalate to triage hotline per guidelines): Unchanged  Do you feel your condition is stable enough to be safe at home until your provider visit?: Yes  Does the patient have their discharge instructions? : Yes  Does the patient have questions regarding their discharge instructions? : No  Were you started on any new medications or were there changes to any of your previous medications? : Yes  Does the patient have all of their medications?: Yes  Do you have questions regarding any of your medications? : No  Discharge follow-up appointment scheduled within 14 calendar days? : Yes  Discharge Follow Up Appointment Date: 11/10/23  Discharge Follow Up Appointment Scheduled with?: Primary Care Provider ([facility] PCP 11/10/23, Vascular 11/21/23 - pt working on getting a sooner appt if possible.)         Post-op (Clinicians Only)  Did the patient have surgery or a procedure: Yes (s/p mechanical thrombectomy)  Incision: healing;closed  Drainage: No  Bleeding: none  Fever: No  Chills: No  Redness:  (Pt states \"I haven't looked at the incision from my procedure, the bandage is off but I don't want to look at it.\")  Incision site pain: Yes (\"sore, hurts a little bit mainly when I bend over.\")  Eating & Drinking: eating and drinking without complaints/concerns  PO Intake: regular diet  Additional Symptoms: decreased appetite  Bowel Function: normal (\"I had a small BM this morning, thinking things are moving more now; took 2 stool softeners (senna-docusate sodium), 1 a night since home and just took " "some Metamucil today.  Not passing much gas since home but I did a lot in the hospital.\")  Date of last BM: 11/09/23      RN unable to complete full post discharge assessment as call focused primarily on patient's expressed concerns in finding a PCP she could trust and have confidence in.  States she's had a difficult time in finding a provider in either [place] or [place], where she spends 6 months a year.  Pt hopeful appt with PCP 11/10 will be a good fit, open to trying providers but unsure how to do this.  RN encouraged pt to contact insurance company for clinic suggestions; pt states with her insurance she can \"go anywhere\" so again unsure where to start.  Pt will ask her  to try looking online for clinics near where she lives and go from there.  Pt appreciative of follow up call.    PLAN                                                      Outpatient Plan:      Follow-up Appointments     Follow-up and recommended labs and tests       Follow up with primary care provider, Libby Moses, within 1-3 weeks, for hospital follow- up. No follow up labs or test are needed.  Follow up with GI recommended.  Patient to contact and be seen as soon as possible   Vascular medicine referral placed     Future Appointments   Date Time Provider Department Center   11/10/2023 10:30 AM Stephenie Zacarias MD [facility]   11/10/2023 11:15 AM RM [facility]   11/21/2023  8:10 AM Bentley Panda MD [facility]         For any urgent concerns, please contact our 24 hour nurse triage line: [phone] ([phone])         Yani Allan RN              "

## 2023-11-10 NOTE — NURSING NOTE
"Chief Complaint   Patient presents with    Hospital F/U     Initial /70 (BP Location: Right arm, Patient Position: Sitting, Cuff Size: Adult Regular)   Pulse 80   Temp 98.5  F (36.9  C) (Oral)   Resp 16   Ht 1.626 m (5' 4\")   Wt 85.5 kg (188 lb 6.4 oz)   SpO2 98%   BMI 32.34 kg/m   Estimated body mass index is 32.34 kg/m  as calculated from the following:    Height as of this encounter: 1.626 m (5' 4\").    Weight as of this encounter: 85.5 kg (188 lb 6.4 oz).  BP completed using cuff size regular right arm    Lisa Magill, CMA    "

## 2023-11-20 NOTE — PROGRESS NOTES
INITIAL VASCULAR MEDICAL ASSESSMENT  REFERRAL SOURCE: Hospitalist    REASON FOR CONSULT: First lifetime VTE presenting as a saddle PE arising form a duplicated left popliteal vein DVT within one month of a COVID infection without post COVID DVT prophylaxis or compression hosiery utilization.    HPI:     Suma Iverson is a 78 year old female who developed cough and shortness of breath.  She was seen in an emergency room in October 14 where a CT of the chest was negative but she tested positive for COVID. She was given Paxlovid and sent home.  On October 20 she went back to the emergency room with diarrhea was evaluated and sent home.  She came back to the emergency room on October 28 with abdominal pain.  CT showed already diagnosed diverticulosis and fibroids.  Other evaluation was negative.  She was sent home once again.  She then returned yet again to the emergency room complaining of a cough and shortness of breath.  Her vital signs were stable heart rate was a little on the elevated side in the 90s oxygen saturation was 99%.  She appeared tachypneic.  EKG showed sinus rhythm and an incomplete right bundle branch block T wave inversions new in V2 and V3. CT PE showed large saddle embolism with extension into the lobar and segmental branches of all lobes. Severe right heart strain was seen. LLE US showed short segment occlusive deep vein thrombus in duplicated left popliteal vein. No DVT in RLE. IR pulmonary artery suction thrombectomy completed 11/4. DOAC initiated 11/6 AM. She was discharged on this on 11/07/2023, and has been doing suboptimally with c/o continuing nausea limiting food intake and activity. She has essentially been in bed for the last six weeks. Her  states she is not  SOB , has no BOB, or leg swelling since her hospitalization. She was not given an Rx for compression hosiery. She has been COVID boosted on 11/10/2023.    Her mother had a DVT in her 50s which appeared to be unprovoked.  She has not had a mammogram since 2018. Her last colonoscopy was 11 years ago and revealed several polyps but no cancer. She does not have an fe deficiency anemia.      Review Of Systems    Skin: negative  Eyes: negative  Ears/Nose/Throat: negative  Respiratory: No shortness of breath, dyspnea on exertion, cough, or hemoptysis  Cardiovascular: negative  Gastrointestinal: negative  Genitourinary: negative  Musculoskeletal: negative  Neurologic: negative  Psychiatric: positive for prominent anxiety  Hematologic/Lymphatic/Immunologic: negative  Endocrine: negative      PAST MEDICAL HISTORY:                  Past Medical History:   Diagnosis Date    Generalized anxiety disorder     HTN (hypertension)     Hyperlipidemia LDL goal <100     Obesity     BRANDON (obstructive sleep apnea)     Peptic ulcer disease     Seizure disorder (H)     Type 2 diabetes mellitus (H)     Uterine leiomyoma, unspecified location        PAST SURGICAL HISTORY:                  Past Surgical History:   Procedure Laterality Date    AS REMV CATARACT INTRACAP,INSERT LENS      IR PULMONARY ANGIOGRAM BILATERAL  11/4/2023    UTERINE FIBROID SURGERY         CURRENT MEDICATIONS:                  Current Outpatient Medications   Medication Sig Dispense Refill    albuterol (PROAIR HFA/PROVENTIL HFA/VENTOLIN HFA) 108 (90 Base) MCG/ACT inhaler Inhale 2 puffs into the lungs every 6 hours as needed for shortness of breath, wheezing or cough 18 g 0    benzonatate (TESSALON) 100 MG capsule Take 1 capsule (100 mg) by mouth 3 times daily as needed for cough 30 capsule 4    diazepam (VALIUM) 5 MG tablet Take 5 mg by mouth 2 times daily as needed for anxiety      famotidine (PEPCID) 40 MG tablet Take 40 mg by mouth at bedtime      lacosamide (VIMPAT) 200 MG TABS tablet Take 200 mg by mouth 2 times daily      ondansetron (ZOFRAN ODT) 4 MG ODT tab Take 1 tablet (4 mg) by mouth every 6 hours as needed for nausea or vomiting 30 tablet 0    pantoprazole (PROTONIX) 20 MG EC  tablet Take 20 mg by mouth daily      [START ON 11/27/2023] rivaroxaban ANTICOAGULANT (XARELTO) 20 MG TABS tablet Take 1 tablet (20 mg) by mouth daily (with dinner) 30 tablet 3    Rivaroxaban ANTICOAGULANT 15 & 20 MG TBPK Starter Therapy Pack Take 15 mg by mouth 2 times daily (with meals) for 21 days, THEN 20 mg daily with food for 39 days. 51 each 0    simvastatin (ZOCOR) 40 MG tablet Take 40 mg by mouth at bedtime      vitamin D2 (ERGOCALCIFEROL) 80335 units (1250 mcg) capsule Take 50,000 Units by mouth once a week      zonisamide (ZONEGRAN) 100 MG capsule Take by mouth 2 times daily 2 capsules in the morning and 1 capsule in the evening         ALLERGIES:                  Allergies   Allergen Reactions    Levetiracetam Anxiety and Other (See Comments)     Other Reaction(s): Anxiety, Irritable       SOCIAL HISTORY:                  Social History     Socioeconomic History    Marital status:      Spouse name: Not on file    Number of children: Not on file    Years of education: Not on file    Highest education level: Not on file   Occupational History    Not on file   Tobacco Use    Smoking status: Former     Types: Cigarettes    Smokeless tobacco: Never   Vaping Use    Vaping Use: Never used   Substance and Sexual Activity    Alcohol use: Not on file    Drug use: Not on file    Sexual activity: Not on file   Other Topics Concern    Not on file   Social History Narrative    Not on file     Social Determinants of Health     Financial Resource Strain: Low Risk  (11/10/2023)    Financial Resource Strain     Within the past 12 months, have you or your family members you live with been unable to get utilities (heat, electricity) when it was really needed?: No   Food Insecurity: Low Risk  (11/10/2023)    Food Insecurity     Within the past 12 months, did you worry that your food would run out before you got money to buy more?: No     Within the past 12 months, did the food you bought just not last and you didn t  have money to get more?: No   Transportation Needs: Low Risk  (11/10/2023)    Transportation Needs     Within the past 12 months, has lack of transportation kept you from medical appointments, getting your medicines, non-medical meetings or appointments, work, or from getting things that you need?: No   Physical Activity: Not on file   Stress: Not on file   Social Connections: Not on file   Interpersonal Safety: Not on file   Housing Stability: Low Risk  (11/10/2023)    Housing Stability     Do you have housing? : Yes     Are you worried about losing your housing?: No       FAMILY HISTORY:                 No family history on file.      Physical exam Reveals:    O/P: WNL  HEENT: WNL  NECK: No JVD, thyromegaly, or lymphadenopathy  HEART: RRR, no murmurs, gallops, or rubs  LUNGS: CTA bilaterally without rales, wheezes, or rhonchi  GI: NABS, nondistended, nontender, soft  EXT:without cyanosis, clubbing, or edema, legs not taut  NEURO: nonfocal  : no flank tenderness      Name: KERI COLEMAN  MRN: [id number]  : 1945  Study Date: 2023 08:47 AM  Age: 78 yrs  Gender: Female  Patient Location: [facility]  Reason For Study: Pulmonary Emboli  Ordering Physician: MAT SNYDER  Referring Physician: JUVENAL CARDENAS  Performed By: Char Singh     BSA: 1.9 m2  Height: 64 in  Weight: 184 lb  HR: 81  BP: 146/88 mmHg  ______________________________________________________________________________  Procedure  Complete Echo Adult. Optison (NDC #[id number]) given intravenously.  ______________________________________________________________________________  Interpretation Summary     The right ventricular systolic function is moderate to severely reduced.  The right ventricle is moderately dilated.  Flattened septum is consistent with RV pressure overload.  Left ventricular systolic function is normal.  The visual ejection fraction is 60-65%.  The left ventricle is normal in size.  Ascending aorta  dilatation is present.(38m) normal 37  Doppler interrogation does not demonstrate signficant stenosis or  insufficiency involving cardiac valves     No old studies for comparison  ______________________________________________________________________________  Left Ventricle  The left ventricle is normal in size. There is normal left ventricular wall  thickness. Left ventricular systolic function is normal. The visual ejection  fraction is 60-65%. Grade I or early diastolic dysfunction. Flattened septum  is consistent with RV pressure overload. There is no thrombus seen in the left  ventricle.     Right Ventricle  The right ventricle is moderately dilated. There is normal right ventricular  wall thickness. The right ventricular systolic function is moderate to  severely reduced. There is no mass or thrombus in the right ventricle.     Atria  Normal left atrial size. Right atrial size is normal. There is no atrial shunt  seen. The left atrial appendage is not well visualized.     Mitral Valve  The mitral valve leaflets appear normal. There is no evidence of stenosis,  fluttering, or prolapse. There is no mitral regurgitation noted. There is no  mitral valve stenosis.     Tricuspid Valve  Normal tricuspid valve. The right ventricular systolic pressure is  approximated at 22.3 mmHg plus the right atrial pressure. Right ventricle  systolic pressure estimate normal. There is mild (1+) tricuspid regurgitation.  There is no tricuspid stenosis.     Aortic Valve  The aortic valve is trileaflet. No aortic regurgitation is present. No aortic  stenosis is present.     Pulmonic Valve  Normal pulmonic valve. There is no pulmonic valvular regurgitation. There is  no pulmonic valvular stenosis.     Vessels  The aortic root is normal size. Ascending aorta dilatation is present. The  inferior vena cava is normal.     Pericardium  The pericardium appears normal. There is no pleural effusion.     Rhythm  Sinus rhythm was  noted.  ______________________________________________________________________________  MMode/2D Measurements & Calculations  IVSd: 1.0 cm     LVIDd: 3.7 cm  LVIDs: 2.6 cm  LVPWd: 1.0 cm  FS: 28.5 %  LV mass(C)d: 112.5 grams  LV mass(C)dI: 59.6 grams/m2  Ao root diam: 3.7 cm  LA dimension: 2.4 cm  asc Aorta Diam: 3.8 cm  LA/Ao: 0.67  LVOT diam: 2.0 cm  LVOT area: 3.1 cm2  Ao root diam index Ht(cm/m): 2.3  Ao root diam index BSA (cm/m2): 1.9  Asc Ao diam index BSA (cm/m2): 2.0  Asc Ao diam index Ht(cm/m): 2.3  LA Volume (BP): 37.3 ml     LA Volume Index (BP): 19.7 ml/m2  RWT: 0.54  TAPSE: 1.2 cm     Doppler Measurements & Calculations  MV E max norm: 48.2 cm/sec  MV A max norm: 93.3 cm/sec  MV E/A: 0.52  MV dec time: 0.28 sec  Ao V2 max: 120.0 cm/sec  Ao max P.0 mmHg  Ao V2 mean: 82.1 cm/sec  Ao mean PG: 3.0 mmHg  Ao V2 VTI: 21.5 cm  HERIBERTO(I,D): 2.2 cm2  HERIBERTO(V,D): 2.5 cm2  LV V1 max PG: 3.8 mmHg  LV V1 max: 96.2 cm/sec  LV V1 VTI: 15.4 cm  SV(LVOT): 47.9 ml  SI(LVOT): 25.4 ml/m2  PA acc time: 0.11 sec  TR max norm: 236.1 cm/sec  TR max P.3 mmHg  AV Norm Ratio (DI): 0.80  HERIBERTO Index (cm2/m2): 1.2  E/E' av.9     Lateral E/e': 6.8  Medial E/e': 7.0  RV S Norm: 12.4 cm/sec     ______________________________________________________________________________  Report approved by: Dr. Zeus Peoples 2023 09:48 AM            [facility]   LOCATION: [facility]  DATE: 2023     PROCEDURE:  1. US-GUIDED ACCESS RIGHT COMMON FEMORAL VEIN  2. RIGHT ILIOCAVAL VENOGRAPHY  3. MAIN PULMONARY ANGIOGRAPHY  4. MAIN PULMONARY MANOMETRY  5. SELECTIVE LEFT PULMONARY ANGIOGRAPHY  6. SELECTIVE RIGHT PULMONARY ANGIOGRAPHY  7. LEFT PULMONARY MECHANICAL THROMBECTOMY (EXTIRPATION OF MATTER)  8. RIGHT PULMONARY MECHANICAL THROMBECTOMY (EXTIRPATION OF MATTER)  9. VASCULAR CLOSURE DEVICE USE     INTERVENTIONAL RADIOLOGIST: Sergio Washington MD     INDICATION: 78-year-old female with large volume pulmonary  emboli  including saddle with imaging and clinical evidence of right heart  strain compatible with intermediate high risk/submassive risk  stratification. IR was consulted for pulmonary angiogram and possible  thrombectomy.     CONSENT: The risks, benefits and alternatives of the procedure were  discussed with the patient or representative in detail. All questions  were answered. Informed consent was given to proceed with the  procedure.     MODERATE SEDATION: Versed 4 mg IV; Fentanyl 250 mcg IV. During the  time out, immediately prior to the administration of medications, the  patient was reassessed for adequacy to receive conscious sedation.   Under physician supervision, Versed and fentanyl were administered for  moderate sedation. Pulse oximetry, heart rate and blood pressure were  continuously monitored by an independent trained observer. The  physician spent 81 minutes of face-to-face sedation time with the  patient.     CONTRAST: 76 mL of Isovue-300 intravascular.  ANTIBIOTICS: None.  ADDITIONAL MEDICATIONS: None.     FLUOROSCOPIC TIME: 24.1 minutes.  RADIATION DOSE: Air Kerma: 195.4 mGy.     COMPLICATIONS: No immediate complications.     UNIVERSAL PROTOCOL: The operative site was marked and any prior  imaging was reviewed. Required items including blood products,  implants, devices and special equipment was made available. Patient  identity was confirmed either verbally, with demographic information,  hospital assigned identification or other identification markers. A  timeout was performed immediately prior to the procedure.     STERILE BARRIER TECHNIQUE: Maximum sterile barrier technique was used.  Cutaneous antisepsis was performed at the operative site with  application of 2% chlorhexidine and large sterile drape. Prior to the  procedure, the  and assistant performed hand hygiene and wore  hat, mask, sterile gown, and sterile gloves during the entire  procedure.     PROCEDURE:    Patient was  positioned supine on the procedure table, and the  bilateral groins were prepped and draped in the usual sterile fashion.  Using real-time ultrasound guidance, access was achieved into the side  common femoral vein and conversion was made for a 7 Faroese vascular  sheath. A permanent image was stored. Utilizing preclose technique,  two Perclose suture devices were deployed at the venous access site,  and the 7 Faroese sheath was reinserted. Right iliocaval venography was  performed through the venous access sheath.     Under live fluoroscopy, a 7 Faroese angled pigtail catheter was  manipulated through the right heart and into the main pulmonary  artery. Main pulmonary artery pressure measurements were obtained.  Main pulmonary angiography was performed.     An angled catheter and stiff Glidewire were manipulated into a  lingular left pulmonary artery. Over a short-flop stiff Amplatz wire,  the sheath was exchanged for a 24 Faroese Inari Intri24 sheath and  advanced into the suprarenal inferior vena cava. Through the sheath a  24 Faroese Inari [id number] device was advanced into the central  left pulmonary artery. Mechanical suction thrombectomy was performed  with extirpation of matter using multiple passes. Repeat left  pulmonary angiogram was obtained. The angled catheter and wire were  used to select a distal left lower lobe pulmonary artery. Over the  stiff Amplatz wire, a 20 Faroese Inari T20 curve catheter was advanced  coaxially into the central left lower lobe pulmonary artery.  Mechanical suction thrombectomy was performed with extirpation of  matter using multiple passes. Post thrombectomy left pulmonary  angiography was performed.     An angled catheter and stiff Glidewire were manipulated into the  distal right interlobar pulmonary artery. Over the Amplatz wire, the  Inari [id number] was advanced into the central right interlobar  pulmonary artery. Mechanical suction thrombectomy was performed  with  extirpation of matter using multiple passes. Post-thrombectomy right  pulmonary angiogram was obtained.     The pigtail cathter was advanced over the wire into the main pulmonary  artery. Post-thrombectomy main pulmonary artery pressure measurements  were obtained.     The catheters and sheath were removed and hemostasis achieved via the  Perclose suture devices and additional manual compression.     FINDINGS:  1. Ultrasound demonstrates a patent and fully compressible right  common femoral vein. A permanent image was stored.  2. Right iliocaval venography demonstrates patency without evidence of  thrombus.  3. Initial pulmonary angiography demonstrates large burden bilateral  central and lobar pulmonary emboli with saddle, and corresponding to  recent CT-PE study.  4. Following mechanical suction thrombectomy, completion left  pulmonary angiogram shows near-complete resolution of central  pulmonary emboli burden and significantly improved perfusion  throughout the left lung. Persistent trace volume segmental and  subsegmental emboli in the inferior lingula.  5. Following mechanical suction thrombectomy, completion right  pulmonary angiogram shows near-complete resolution of central  pulmonary emboli and significantly improved perfusion throughout the  right lung. Persistent trace volume segmental and subsegmental emboli  in the right middle lobe.  6. External thrombus examination demonstrates large volume  predominantly acute thrombus.     PULMONARY ARTERIAL PRESSURES (mmHg):  Pre-thrombectomy: 41/5 (mean 20)  Post-thrombectomy: 34/10 (mean 19)                                                                      IMPRESSION:   Pulmonary angiogram and successful bilateral mechanical suction  thrombectomy, with significantly improved angiographic result and  decreased pulmonary artery systolic pressures as described.     COURTNEY BURTON MD           LOWER EXTREMITY VENOUS DUPLEX BILATERAL 11/3/2023 1:33 PM      CLINICAL HISTORY: Shortness of breath Eval for lower extremity clots  please  TECHNIQUE: Venous Duplex ultrasound of bilateral lower extremities  with and without compression, augmentation and duplex. Color flow and  spectral Doppler with waveform analysis performed.     COMPARISON: None.     FINDINGS: Exam includes the common femoral, femoral, popliteal veins  as well as segmentally visualized deep calf veins and greater  saphenous vein.      RIGHT: No deep vein thrombosis. No superficial thrombophlebitis. No  popliteal cyst.     LEFT: Duplicated venous system at the level of the knee with short  segment occlusive thrombus in one of the popliteal veins. The other  popliteal vein is patent. No superficial thrombophlebitis. No  popliteal cyst.                                                                      IMPRESSION:  1.  Short segment occlusive deep vein thrombus in duplicated left  popliteal vein.  2.  No deep venous thrombosis in the right lower extremity.     JAMILA BAUMANN MD       CT CHEST PULMONARY EMBOLISM W CONTRAST 11/3/2023 11:37 AM     CLINICAL HISTORY: Patent with new exertional shortness of breath after  being diagnosed [facility] Covid 10/14  TECHNIQUE: CT angiogram chest during arterial phase injection IV  contrast. 2D and 3D MIP reconstructions were performed by the CT  technologist. Dose reduction techniques were used.      CONTRAST: 77mL Isovue-370     COMPARISON: 10/14/2023     FINDINGS:  ANGIOGRAM CHEST: Large saddle pulmonary embolism is noted which  extends into the lobar and segmental branches of all lobes. Severe  right heart strain. Thoracic aorta is negative for dissection.     LUNGS AND PLEURA: Minimal atelectasis is noted at the lung bases. No  focal consolidation or effusion.     MEDIASTINUM/AXILLAE: Heart is normal in size. No mediastinal,  axillary, or hilar adenopathy.     UPPER ABDOMEN: Small left renal cysts which are benign and need no  further follow-up.     MUSCULOSKELETAL:  Degenerative changes of the spine.                                                                      IMPRESSION:  1.  Large saddle embolism is noted with extension into the lobar and  segmental branches of all lobes. Severe right heart strain. Recommend  consultation with interventional radiology.     Findings were discussed with Dr. Ron Mitchell at 11:52 am on 11/03/2023.     DK CALLE MD       US PELVIC COMPLETE WITH TRANSVAGINAL  10/21/2019 10:27 PM       HISTORY: Pelvic pain for one week, history of fibroid removed eight years ago.      COMPARISON: None.     FINDINGS: Transabdominal and endovaginal imaging was performed. The uterus measures 7.5 x 4.6 x 6.0 cm. At least seven uterine fibroids are identified. A posterior lower uterine segment fibroid measures 1.4 x 1.2 cm. An anterior fundal fibroid measures   1.6 x 1.3 cm. A posterior fundal fibroid measures 1.6 x 1.6 cm. The endometrium measures 0.2 cm in thickness. The ovaries are normal in size and appearance bilaterally. No adnexal mass. No free pelvic fluid.                                                                      IMPRESSION:  1. Multiple uterine fibroids.  2. The endometrium is normal in thickness at 0.2 cm. The endometrium is obscured near the uterine fundus and submucosal fibroid is a possibility.        : ACR BI-RADS Category 1: Negative    RECOMMENDATION: Follow Up Imaging in 12 months - Bilateral    The results and recommendations of this examination will be communicated  to the patient.  Narrative    This result has an attachment that is not available.  MM MAMMOGRAM SCREENING BILAT W CAD performed on 10/25/18    Compared to: 10/26/2016 MM Mammogram Screening Bilat W CAD, 08/25/2015 MM  Mammogram Screening Bilat W CAD, and 10/28/2008 Foreign Image(S) Mammogram      FINDINGS: Bilateral screening mammogram was performed with the assistance  of Computer-Aided Detection. The breasts are almost entirely fatty.    There is no radiographic  evidence of malignancy.         A/P:    (I26.02)First lifetime VTE presenting as a saddle PE arising form a duplicated left popliteal vein DVT within one month of a COVID infection without post COVID DVT prophylaxis or compression hosiery utilization and concomitant prolonged inactivity  Comment: This was her first lifetime VTE event. She has a maternal h/o DVT, w/o other FHx DVT/PE. No recurrent miscarriages. Provoked due to antecedent COVID infection, COVID infection associated inactivity, and a congenitally duplicated left popliteal vein. No HC w/u warranted other than cancer screening as below. She is being very inactive and would benefit form PT. However her and her  are leaving for [place] for six months this weekend.   Plan: Continue rivaroxaban ANTICOAGULANT (XARELTO) 20 MG TABS for at least six months. Get mammogram while in [place]. She had polyps previously so Cologuard screening while on AC is not an option. As she has no fe def anemia presently, the need for colonoscopy is less urgent. RTC in six months. Check Echocardiogram Complete, US Lower Extremity Venous Duplex Bilateral, D dimer quantitative in May, 2024. RTC one week later to consider cessation of AC at that time. Wear knee high compression as much as possible in the interim. If AC is stopped in six months, repeat d dimer on month later to ascertain if it increases dramatically off of AC, in which case AC will need to be resumed. Get colonoscopy once off of AC. If remaining weak and fatigued due to nausea whiel in [place], seek GI asistance and PT whiel in [place].             (I82.432) Acute deep vein thrombosis (DVT) of popliteal vein of left lower extremity (H)  Comment: As above  Plan: Compression Sleeve/Stocking Order for DME -         ONLY FOR DME            (I10) Essential hypertension  Comment: BP elevated today. Lisinopril stopped at discharge, this will be resumed presently.  Plan: lisinopril (ZESTRIL) 10 MG tablet          62  minutes total medical care on today's date.

## 2023-11-21 ENCOUNTER — OFFICE VISIT (OUTPATIENT)
Dept: OTHER | Facility: CLINIC | Age: 78
End: 2023-11-21
Attending: INTERNAL MEDICINE
Payer: MEDICARE

## 2023-11-21 VITALS
OXYGEN SATURATION: 98 % | BODY MASS INDEX: 31.62 KG/M2 | DIASTOLIC BLOOD PRESSURE: 83 MMHG | WEIGHT: 184.2 LBS | HEART RATE: 78 BPM | SYSTOLIC BLOOD PRESSURE: 156 MMHG

## 2023-11-21 DIAGNOSIS — I26.09 OTHER ACUTE PULMONARY EMBOLISM WITH ACUTE COR PULMONALE (H): Primary | ICD-10-CM

## 2023-11-21 DIAGNOSIS — I10 ESSENTIAL HYPERTENSION: ICD-10-CM

## 2023-11-21 DIAGNOSIS — I82.432 ACUTE DEEP VEIN THROMBOSIS (DVT) OF POPLITEAL VEIN OF LEFT LOWER EXTREMITY (H): ICD-10-CM

## 2023-11-21 DIAGNOSIS — I26.02 SADDLE EMBOLUS OF PULMONARY ARTERY WITH ACUTE COR PULMONALE, UNSPECIFIED CHRONICITY (H): ICD-10-CM

## 2023-11-21 PROCEDURE — 99205 OFFICE O/P NEW HI 60 MIN: CPT | Performed by: INTERNAL MEDICINE

## 2023-11-21 PROCEDURE — G0463 HOSPITAL OUTPT CLINIC VISIT: HCPCS | Performed by: INTERNAL MEDICINE

## 2023-11-21 RX ORDER — LISINOPRIL 10 MG/1
10 TABLET ORAL DAILY
Qty: 90 TABLET | Refills: 3 | Status: SHIPPED | OUTPATIENT
Start: 2023-11-21 | End: 2024-02-22

## 2023-11-21 NOTE — PROGRESS NOTES
[facility]        Patient is here for a consult.    Pt is currently taking Statin and Xarelto.    BP (!) 156/83 (BP Location: Left arm, Patient Position: Chair, Cuff Size: Adult Large)   Pulse 78   Wt 184 lb 3.2 oz (83.6 kg)   SpO2 98%   BMI 31.62 kg/m      The provider has been notified that the patient has no concerns.     Questions patient would like addressed today are: N/A.    Refills are needed: N/A    Has homecare services and agency name:  Vero Du MA

## 2023-12-14 ENCOUNTER — TELEPHONE (OUTPATIENT)
Dept: OTHER | Facility: CLINIC | Age: 78
End: 2023-12-14
Payer: MEDICARE

## 2023-12-14 DIAGNOSIS — I26.02 SADDLE EMBOLUS OF PULMONARY ARTERY WITH ACUTE COR PULMONALE, UNSPECIFIED CHRONICITY (H): ICD-10-CM

## 2024-02-22 ENCOUNTER — TELEPHONE (OUTPATIENT)
Dept: OTHER | Facility: CLINIC | Age: 79
End: 2024-02-22
Payer: MEDICARE

## 2024-02-22 DIAGNOSIS — I10 ESSENTIAL HYPERTENSION: ICD-10-CM

## 2024-02-22 RX ORDER — LISINOPRIL 10 MG/1
10 TABLET ORAL DAILY
Qty: 90 TABLET | Refills: 3 | Status: SHIPPED | OUTPATIENT
Start: 2024-02-22 | End: 2024-07-11 | Stop reason: DRUGHIGH

## 2024-04-16 ENCOUNTER — TRANSFERRED RECORDS (OUTPATIENT)
Dept: HEALTH INFORMATION MANAGEMENT | Facility: CLINIC | Age: 79
End: 2024-04-16
Payer: MEDICARE

## 2024-05-02 ENCOUNTER — TELEPHONE (OUTPATIENT)
Dept: OTHER | Facility: CLINIC | Age: 79
End: 2024-05-02
Payer: MEDICARE

## 2024-05-02 NOTE — TELEPHONE ENCOUNTER
Future Appointments   Date Time Provider Department Center   5/20/2024 10:30 AM CR LAB CRLABR CR   6/7/2024 12:30 PM [id number] [facility]   6/7/2024  1:30 PM RSCCUS2 [facility]   6/13/2024  3:30 PM Bentley Panda MD [facility]

## 2024-05-02 NOTE — TELEPHONE ENCOUNTER
Patient stating that the mamogram info has not yet been faxed to [facility] yet. She is asking for us to disregard search for this fax for now.

## 2024-05-02 NOTE — TELEPHONE ENCOUNTER
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  Large saddle embolism is noted with extension into the lobar and  segmental branches of all lobes. Severe right heart strain.     Check Echocardiogram Complete, US Lower Extremity Venous Duplex Bilateral, D dimer quantitative in May, 2024. RTC one week later to consider cessation of AC at that time     Explained rationale of follow up testing to patient.  She understands that the echo, d-dimer and lower extremity ultrasounds will be used to determine if it is safe for her to discontinue her blood thinner.        Patient is waiting for a call back from scheduling to arrange for:    Follow-up to 11/21/23   D-dimer (non-fasting lab)   Echocardiogram   BLE venous ultrasound   Follow up after ultrasound.  (Echo & lab to be done about a week prior).

## 2024-05-20 ENCOUNTER — LAB (OUTPATIENT)
Dept: LAB | Facility: CLINIC | Age: 79
End: 2024-05-20
Payer: MEDICARE

## 2024-05-20 DIAGNOSIS — I26.09 OTHER ACUTE PULMONARY EMBOLISM WITH ACUTE COR PULMONALE (H): ICD-10-CM

## 2024-05-20 LAB — D DIMER PPP FEU-MCNC: 0.34 UG/ML FEU (ref 0–0.5)

## 2024-05-20 PROCEDURE — 36415 COLL VENOUS BLD VENIPUNCTURE: CPT

## 2024-05-20 PROCEDURE — 85379 FIBRIN DEGRADATION QUANT: CPT

## 2024-06-07 ENCOUNTER — HOSPITAL ENCOUNTER (OUTPATIENT)
Dept: ULTRASOUND IMAGING | Facility: CLINIC | Age: 79
Discharge: HOME | End: 2024-06-07
Attending: INTERNAL MEDICINE | Admitting: INTERNAL MEDICINE
Payer: MEDICARE

## 2024-06-07 DIAGNOSIS — I26.09 OTHER ACUTE PULMONARY EMBOLISM WITH ACUTE COR PULMONALE (H): ICD-10-CM

## 2024-06-07 PROCEDURE — 93970 EXTREMITY STUDY: CPT

## 2024-06-17 ENCOUNTER — HOSPITAL ENCOUNTER (OUTPATIENT)
Dept: CARDIOLOGY | Facility: CLINIC | Age: 79
Discharge: HOME | End: 2024-06-17
Attending: INTERNAL MEDICINE | Admitting: INTERNAL MEDICINE
Payer: MEDICARE

## 2024-06-17 DIAGNOSIS — I26.09 OTHER ACUTE PULMONARY EMBOLISM WITH ACUTE COR PULMONALE (H): ICD-10-CM

## 2024-06-17 LAB — LVEF ECHO: NORMAL

## 2024-06-17 PROCEDURE — 93306 TTE W/DOPPLER COMPLETE: CPT

## 2024-06-17 PROCEDURE — 93306 TTE W/DOPPLER COMPLETE: CPT | Mod: 26 | Performed by: INTERNAL MEDICINE

## 2024-06-19 ENCOUNTER — VIRTUAL VISIT (OUTPATIENT)
Dept: OTHER | Facility: CLINIC | Age: 79
End: 2024-06-19
Attending: INTERNAL MEDICINE
Payer: MEDICARE

## 2024-06-19 DIAGNOSIS — I71.21 ANEURYSM OF ASCENDING AORTA WITHOUT RUPTURE (H): Primary | ICD-10-CM

## 2024-06-19 DIAGNOSIS — I10 ESSENTIAL HYPERTENSION: ICD-10-CM

## 2024-06-19 DIAGNOSIS — I82.432 ACUTE DEEP VEIN THROMBOSIS (DVT) OF POPLITEAL VEIN OF LEFT LOWER EXTREMITY (H): ICD-10-CM

## 2024-06-19 DIAGNOSIS — I26.02 SADDLE EMBOLUS OF PULMONARY ARTERY WITH ACUTE COR PULMONALE, UNSPECIFIED CHRONICITY (H): ICD-10-CM

## 2024-06-19 DIAGNOSIS — I26.09 OTHER ACUTE PULMONARY EMBOLISM WITH ACUTE COR PULMONALE (H): ICD-10-CM

## 2024-06-19 PROCEDURE — 99443 PR PHYSICIAN TELEPHONE EVALUATION 21-30 MIN: CPT | Mod: 93 | Performed by: INTERNAL MEDICINE

## 2024-06-19 NOTE — PROGRESS NOTES
VASCULAR MEDICAL FOLLOW UP ASSESSMENT  REFERRAL SOURCE: Hospitalist     REASON FOR CONSULT: First lifetime VTE presenting as a saddle PE arising form a duplicated left popliteal vein DVT within one month of a COVID infection without post COVID DVT prophylaxis or compression hosiery utilization.          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 She has a maternal h/o DVT, w/o other FHx DVT/PE. No recurrent miscarriages. Provoked due to antecedent COVID infection, COVID infection associated inactivity, and a congenitally duplicated left popliteal vein. No HC w/u warranted other than cancer screening as below. She had been very inactive and would have benefited from PT. However her and her  left  for [place] for six months shortly after having been last seen on 11/21/2023. She was instructed to get mammogram while in [place]. She had polyps previously so Cologuard screening while on AC is not an option. As she has no fe def anemia presently, the need for colonoscopy is less urgent. TTE of 6/17/2024 reveals no pulmonary htn and resolution of right heart strain. Incidentally discovered was an ATAA measuring 38 mm. . US Lower Extremity Venous Duplex Bilateral of 6/7/24 reveals resolution of DVT.  D dimer of 5/21/2024 was normal.     Plan: Discontinue rivaroxaban ANTICOAGULANT (XARELTO) 20 MG TABS Presently. RTC in one month for a repeat d dimer. RTC one week later to discuss continued cessation of AC at that time. Wear knee high compression.Get colonoscopy once off of AC. This will be mentioned to her at her next visit. .              (I82.432) Acute deep vein thrombosis (DVT) of popliteal vein of left lower extremity (H)    Comment: As above    Plan:  Compression Sleeve/Stocking Order for DME -         ONLY FOR DME           (I71.21) Aneurysm of ascending aorta without rupture (H24)  (primary encounter diagnosis)    Comment: Asx. Surgical treatment not warranted.     Plan: Check TTE in one year. Order at next visit. . Address in person BP at next visit in one month. Goal BP < 120/70      24 minutes total medical care on today's date.    MD location: office  Pt location: home    Phone call start: 16:10  Phone call end: 16:34          HPI:      Suma Iverson is a 78 year old female who developed cough and shortness of breath.  She was seen in an emergency room in October 14 where a CT of the chest was negative but she tested positive for COVID. She was given Paxlovid and sent home.  On October 20 she went back to the emergency room with diarrhea was evaluated and sent home.  She came back to the emergency room on October 28 with abdominal pain.  CT showed already diagnosed diverticulosis and fibroids.  Other evaluation was negative.  She was sent home once again.  She then returned yet again to the emergency room complaining of a cough and shortness of breath.  Her vital signs were stable heart rate was a little on the elevated side in the 90s oxygen saturation was 99%.  She appeared tachypneic.  EKG showed sinus rhythm and an incomplete right bundle branch block T wave inversions new in V2 and V3. CT PE showed large saddle embolism with extension into the lobar and segmental branches of all lobes. Severe right heart strain was seen. LLE US showed short segment occlusive deep vein thrombus in duplicated left popliteal vein. No DVT in RLE. IR pulmonary artery suction thrombectomy completed 11/4. DOAC initiated 11/6 AM. She was discharged on this on 11/07/2023, and has been doing suboptimally with c/o continuing nausea limiting food intake and activity. She has essentially been in bed for the last six weeks. Her  states she is not  SOB , has no BOB, or leg  swelling since her hospitalization. She was not given an Rx for compression hosiery. She has been COVID boosted on 11/10/2023.     Her mother had a DVT in her 50s which appeared to be unprovoked. She has not had a mammogram since 2018. Her last colonoscopy was 11 years ago and revealed several polyps but no cancer. She does not have an fe deficiency anemia.      Review Of Systems     Skin: negative  Eyes: negative  Ears/Nose/Throat: negative  Respiratory: No shortness of breath, dyspnea on exertion, cough, or hemoptysis  Cardiovascular: negative  Gastrointestinal: negative  Genitourinary: negative  Musculoskeletal: negative  Neurologic: negative  Psychiatric: positive for prominent anxiety  Hematologic/Lymphatic/Immunologic: negative  Endocrine: negative        PAST MEDICAL HISTORY:                  Past Medical History        Past Medical History:   Diagnosis Date    Generalized anxiety disorder      HTN (hypertension)      Hyperlipidemia LDL goal <100      Obesity      BRANDON (obstructive sleep apnea)      Peptic ulcer disease      Seizure disorder (H)      Type 2 diabetes mellitus (H)      Uterine leiomyoma, unspecified location              PAST SURGICAL HISTORY:                  Past Surgical History         Past Surgical History:   Procedure Laterality Date    AS REMV CATARACT INTRACAP,INSERT LENS        IR PULMONARY ANGIOGRAM BILATERAL   11/4/2023    UTERINE FIBROID SURGERY                CURRENT MEDICATIONS:                  Current Outpatient Prescriptions          Current Outpatient Medications   Medication Sig Dispense Refill    albuterol (PROAIR HFA/PROVENTIL HFA/VENTOLIN HFA) 108 (90 Base) MCG/ACT inhaler Inhale 2 puffs into the lungs every 6 hours as needed for shortness of breath, wheezing or cough 18 g 0    benzonatate (TESSALON) 100 MG capsule Take 1 capsule (100 mg) by mouth 3 times daily as needed for cough 30 capsule 4    diazepam (VALIUM) 5 MG tablet Take 5 mg by mouth 2 times daily as needed for  anxiety        famotidine (PEPCID) 40 MG tablet Take 40 mg by mouth at bedtime        lacosamide (VIMPAT) 200 MG TABS tablet Take 200 mg by mouth 2 times daily        ondansetron (ZOFRAN ODT) 4 MG ODT tab Take 1 tablet (4 mg) by mouth every 6 hours as needed for nausea or vomiting 30 tablet 0    pantoprazole (PROTONIX) 20 MG EC tablet Take 20 mg by mouth daily        [START ON 11/27/2023] rivaroxaban ANTICOAGULANT (XARELTO) 20 MG TABS tablet Take 1 tablet (20 mg) by mouth daily (with dinner) 30 tablet 3    Rivaroxaban ANTICOAGULANT 15 & 20 MG TBPK Starter Therapy Pack Take 15 mg by mouth 2 times daily (with meals) for 21 days, THEN 20 mg daily with food for 39 days. 51 each 0    simvastatin (ZOCOR) 40 MG tablet Take 40 mg by mouth at bedtime        vitamin D2 (ERGOCALCIFEROL) 44648 units (1250 mcg) capsule Take 50,000 Units by mouth once a week        zonisamide (ZONEGRAN) 100 MG capsule Take by mouth 2 times daily 2 capsules in the morning and 1 capsule in the evening                ALLERGIES:                  Allergies         Allergies   Allergen Reactions    Levetiracetam Anxiety and Other (See Comments)       Other Reaction(s): Anxiety, Irritable            SOCIAL HISTORY:                  Social History   Social History            Socioeconomic History    Marital status:        Spouse name: Not on file    Number of children: Not on file    Years of education: Not on file    Highest education level: Not on file   Occupational History    Not on file   Tobacco Use    Smoking status: Former       Types: Cigarettes    Smokeless tobacco: Never   Vaping Use    Vaping Use: Never used   Substance and Sexual Activity    Alcohol use: Not on file    Drug use: Not on file    Sexual activity: Not on file   Other Topics Concern    Not on file   Social History Narrative    Not on file      Social Determinants of Health           Financial Resource Strain: Low Risk  (11/10/2023)     Financial Resource Strain      Within  the past 12 months, have you or your family members you live with been unable to get utilities (heat, electricity) when it was really needed?: No   Food Insecurity: Low Risk  (11/10/2023)     Food Insecurity      Within the past 12 months, did you worry that your food would run out before you got money to buy more?: No      Within the past 12 months, did the food you bought just not last and you didn t have money to get more?: No   Transportation Needs: Low Risk  (11/10/2023)     Transportation Needs      Within the past 12 months, has lack of transportation kept you from medical appointments, getting your medicines, non-medical meetings or appointments, work, or from getting things that you need?: No   Physical Activity: Not on file   Stress: Not on file   Social Connections: Not on file   Interpersonal Safety: Not on file   Housing Stability: Low Risk  (11/10/2023)     Housing Stability      Do you have housing? : Yes      Are you worried about losing your housing?: No            FAMILY HISTORY:                   Family History   No family history on file.           Physical exam Reveals:     O/P: WNL  HEENT: WNL  NECK: No JVD, thyromegaly, or lymphadenopathy  HEART: RRR, no murmurs, gallops, or rubs  LUNGS: CTA bilaterally without rales, wheezes, or rhonchi  GI: NABS, nondistended, nontender, soft  EXT:without cyanosis, clubbing, or edema, legs not taut  NEURO: nonfocal  : no flank tenderness        Name: KERI COLEMAN  MRN: [id number]  : 1945  Study Date: 2023 08:47 AM  Age: 78 yrs  Gender: Female  Patient [facility]  Reason For Study: Pulmonary Emboli  Ordering Physician: MAT SNYDER  Referring Physician: JUVENAL CARDENAS  Performed By: Char Singh     BSA: 1.9 m2  Height: 64 in  Weight: 184 lb  HR: 81  BP: 146/88 mmHg  ______________________________________________________________________________  Procedure  Complete Echo Adult. Optison (NDC #[id number]) given  intravenously.  ______________________________________________________________________________  Interpretation Summary     The right ventricular systolic function is moderate to severely reduced.  The right ventricle is moderately dilated.  Flattened septum is consistent with RV pressure overload.  Left ventricular systolic function is normal.  The visual ejection fraction is 60-65%.  The left ventricle is normal in size.  Ascending aorta dilatation is present.(38m) normal 37  Doppler interrogation does not demonstrate signficant stenosis or  insufficiency involving cardiac valves     No old studies for comparison  ______________________________________________________________________________  Left Ventricle  The left ventricle is normal in size. There is normal left ventricular wall  thickness. Left ventricular systolic function is normal. The visual ejection  fraction is 60-65%. Grade I or early diastolic dysfunction. Flattened septum  is consistent with RV pressure overload. There is no thrombus seen in the left  ventricle.     Right Ventricle  The right ventricle is moderately dilated. There is normal right ventricular  wall thickness. The right ventricular systolic function is moderate to  severely reduced. There is no mass or thrombus in the right ventricle.     Atria  Normal left atrial size. Right atrial size is normal. There is no atrial shunt  seen. The left atrial appendage is not well visualized.     Mitral Valve  The mitral valve leaflets appear normal. There is no evidence of stenosis,  fluttering, or prolapse. There is no mitral regurgitation noted. There is no  mitral valve stenosis.     Tricuspid Valve  Normal tricuspid valve. The right ventricular systolic pressure is  approximated at 22.3 mmHg plus the right atrial pressure. Right ventricle  systolic pressure estimate normal. There is mild (1+) tricuspid regurgitation.  There is no tricuspid stenosis.     Aortic Valve  The aortic valve is  trileaflet. No aortic regurgitation is present. No aortic  stenosis is present.     Pulmonic Valve  Normal pulmonic valve. There is no pulmonic valvular regurgitation. There is  no pulmonic valvular stenosis.     Vessels  The aortic root is normal size. Ascending aorta dilatation is present. The  inferior vena cava is normal.     Pericardium  The pericardium appears normal. There is no pleural effusion.     Rhythm  Sinus rhythm was noted.  ______________________________________________________________________________  MMode/2D Measurements & Calculations  IVSd: 1.0 cm     LVIDd: 3.7 cm  LVIDs: 2.6 cm  LVPWd: 1.0 cm  FS: 28.5 %  LV mass(C)d: 112.5 grams  LV mass(C)dI: 59.6 grams/m2  Ao root diam: 3.7 cm  LA dimension: 2.4 cm  asc Aorta Diam: 3.8 cm  LA/Ao: 0.67  LVOT diam: 2.0 cm  LVOT area: 3.1 cm2  Ao root diam index Ht(cm/m): 2.3  Ao root diam index BSA (cm/m2): 1.9  Asc Ao diam index BSA (cm/m2): 2.0  Asc Ao diam index Ht(cm/m): 2.3  LA Volume (BP): 37.3 ml     LA Volume Index (BP): 19.7 ml/m2  RWT: 0.54  TAPSE: 1.2 cm     Doppler Measurements & Calculations  MV E max norm: 48.2 cm/sec  MV A max norm: 93.3 cm/sec  MV E/A: 0.52  MV dec time: 0.28 sec  Ao V2 max: 120.0 cm/sec  Ao max P.0 mmHg  Ao V2 mean: 82.1 cm/sec  Ao mean PG: 3.0 mmHg  Ao V2 VTI: 21.5 cm  HERIBERTO(I,D): 2.2 cm2  HERIBERTO(V,D): 2.5 cm2  LV V1 max PG: 3.8 mmHg  LV V1 max: 96.2 cm/sec  LV V1 VTI: 15.4 cm  SV(LVOT): 47.9 ml  SI(LVOT): 25.4 ml/m2  PA acc time: 0.11 sec  TR max norm: 236.1 cm/sec  TR max P.3 mmHg  AV Norm Ratio (DI): 0.80  HERIBERTO Index (cm2/m2): 1.2  E/E' av.9     Lateral E/e': 6.8  Medial E/e': 7.0  RV S Norm: 12.4 cm/sec     ______________________________________________________________________________  Report approved by: Dr. Zeus Peoples 2023 09:48 AM              [facility]   LOCATION: [facility]  DATE: 2023     PROCEDURE:  1. US-GUIDED ACCESS RIGHT COMMON FEMORAL VEIN  2. RIGHT ILIOCAVAL  VENOGRAPHY  3. MAIN PULMONARY ANGIOGRAPHY  4. MAIN PULMONARY MANOMETRY  5. SELECTIVE LEFT PULMONARY ANGIOGRAPHY  6. SELECTIVE RIGHT PULMONARY ANGIOGRAPHY  7. LEFT PULMONARY MECHANICAL THROMBECTOMY (EXTIRPATION OF MATTER)  8. RIGHT PULMONARY MECHANICAL THROMBECTOMY (EXTIRPATION OF MATTER)  9. VASCULAR CLOSURE DEVICE USE     INTERVENTIONAL RADIOLOGIST: Sergio Washington MD     INDICATION: 78-year-old female with large volume pulmonary emboli  including saddle with imaging and clinical evidence of right heart  strain compatible with intermediate high risk/submassive risk  stratification. IR was consulted for pulmonary angiogram and possible  thrombectomy.     CONSENT: The risks, benefits and alternatives of the procedure were  discussed with the patient or representative in detail. All questions  were answered. Informed consent was given to proceed with the  procedure.     MODERATE SEDATION: Versed 4 mg IV; Fentanyl 250 mcg IV. During the  time out, immediately prior to the administration of medications, the  patient was reassessed for adequacy to receive conscious sedation.   Under physician supervision, Versed and fentanyl were administered for  moderate sedation. Pulse oximetry, heart rate and blood pressure were  continuously monitored by an independent trained observer. The  physician spent 81 minutes of face-to-face sedation time with the  patient.     CONTRAST: 76 mL of Isovue-300 intravascular.  ANTIBIOTICS: None.  ADDITIONAL MEDICATIONS: None.     FLUOROSCOPIC TIME: 24.1 minutes.  RADIATION DOSE: Air Kerma: 195.4 mGy.     COMPLICATIONS: No immediate complications.     UNIVERSAL PROTOCOL: The operative site was marked and any prior  imaging was reviewed. Required items including blood products,  implants, devices and special equipment was made available. Patient  identity was confirmed either verbally, with demographic information,  hospital assigned identification or other identification markers. A  timeout was  performed immediately prior to the procedure.     STERILE BARRIER TECHNIQUE: Maximum sterile barrier technique was used.  Cutaneous antisepsis was performed at the operative site with  application of 2% chlorhexidine and large sterile drape. Prior to the  procedure, the  and assistant performed hand hygiene and wore  hat, mask, sterile gown, and sterile gloves during the entire  procedure.     PROCEDURE:    Patient was positioned supine on the procedure table, and the  bilateral groins were prepped and draped in the usual sterile fashion.  Using real-time ultrasound guidance, access was achieved into the side  common femoral vein and conversion was made for a 7 Georgian vascular  sheath. A permanent image was stored. Utilizing preclose technique,  two Perclose suture devices were deployed at the venous access site,  and the 7 Georgian sheath was reinserted. Right iliocaval venography was  performed through the venous access sheath.     Under live fluoroscopy, a 7 Georgian angled pigtail catheter was  manipulated through the right heart and into the main pulmonary  artery. Main pulmonary artery pressure measurements were obtained.  Main pulmonary angiography was performed.     An angled catheter and stiff Glidewire were manipulated into a  lingular left pulmonary artery. Over a short-flop stiff Amplatz wire,  the sheath was exchanged for a 24 Georgian Inari Intri24 sheath and  advanced into the suprarenal inferior vena cava. Through the sheath a  24 Georgian Inari [id number] device was advanced into the central  left pulmonary artery. Mechanical suction thrombectomy was performed  with extirpation of matter using multiple passes. Repeat left  pulmonary angiogram was obtained. The angled catheter and wire were  used to select a distal left lower lobe pulmonary artery. Over the  stiff Amplatz wire, a 20 Georgian Inari T20 curve catheter was advanced  coaxially into the central left lower lobe pulmonary  artery.  Mechanical suction thrombectomy was performed with extirpation of  matter using multiple passes. Post thrombectomy left pulmonary  angiography was performed.     An angled catheter and stiff Glidewire were manipulated into the  distal right interlobar pulmonary artery. Over the Amplatz wire, the  Inari [id number] was advanced into the central right interlobar  pulmonary artery. Mechanical suction thrombectomy was performed with  extirpation of matter using multiple passes. Post-thrombectomy right  pulmonary angiogram was obtained.     The pigtail cathter was advanced over the wire into the main pulmonary  artery. Post-thrombectomy main pulmonary artery pressure measurements  were obtained.     The catheters and sheath were removed and hemostasis achieved via the  Perclose suture devices and additional manual compression.     FINDINGS:  1. Ultrasound demonstrates a patent and fully compressible right  common femoral vein. A permanent image was stored.  2. Right iliocaval venography demonstrates patency without evidence of  thrombus.  3. Initial pulmonary angiography demonstrates large burden bilateral  central and lobar pulmonary emboli with saddle, and corresponding to  recent CT-PE study.  4. Following mechanical suction thrombectomy, completion left  pulmonary angiogram shows near-complete resolution of central  pulmonary emboli burden and significantly improved perfusion  throughout the left lung. Persistent trace volume segmental and  subsegmental emboli in the inferior lingula.  5. Following mechanical suction thrombectomy, completion right  pulmonary angiogram shows near-complete resolution of central  pulmonary emboli and significantly improved perfusion throughout the  right lung. Persistent trace volume segmental and subsegmental emboli  in the right middle lobe.  6. External thrombus examination demonstrates large volume  predominantly acute thrombus.     PULMONARY ARTERIAL PRESSURES  (mmHg):  Pre-thrombectomy: 41/5 (mean 20)  Post-thrombectomy: 34/10 (mean 19)                                                                      IMPRESSION:   Pulmonary angiogram and successful bilateral mechanical suction  thrombectomy, with significantly improved angiographic result and  decreased pulmonary artery systolic pressures as described.     COURTNEY BURTON MD            US LOWER EXTREMITY VENOUS DUPLEX BILATERAL 11/3/2023 1:33 PM     CLINICAL HISTORY: Shortness of breath Eval for lower extremity clots  please  TECHNIQUE: Venous Duplex ultrasound of bilateral lower extremities  with and without compression, augmentation and duplex. Color flow and  spectral Doppler with waveform analysis performed.     COMPARISON: None.     FINDINGS: Exam includes the common femoral, femoral, popliteal veins  as well as segmentally visualized deep calf veins and greater  saphenous vein.      RIGHT: No deep vein thrombosis. No superficial thrombophlebitis. No  popliteal cyst.     LEFT: Duplicated venous system at the level of the knee with short  segment occlusive thrombus in one of the popliteal veins. The other  popliteal vein is patent. No superficial thrombophlebitis. No  popliteal cyst.                                                                      IMPRESSION:  1.  Short segment occlusive deep vein thrombus in duplicated left  popliteal vein.  2.  No deep venous thrombosis in the right lower extremity.     JAMILA BAUMANN MD       CT CHEST PULMONARY EMBOLISM W CONTRAST 11/3/2023 11:37 AM     CLINICAL HISTORY: Patent with new exertional shortness of breath after  being diagnosed [facility] Covid 10/14  TECHNIQUE: CT angiogram chest during arterial phase injection IV  contrast. 2D and 3D MIP reconstructions were performed by the CT  technologist. Dose reduction techniques were used.      CONTRAST: 77mL Isovue-370     COMPARISON: 10/14/2023     FINDINGS:  ANGIOGRAM CHEST: Large saddle pulmonary embolism is noted  which  extends into the lobar and segmental branches of all lobes. Severe  right heart strain. Thoracic aorta is negative for dissection.     LUNGS AND PLEURA: Minimal atelectasis is noted at the lung bases. No  focal consolidation or effusion.     MEDIASTINUM/AXILLAE: Heart is normal in size. No mediastinal,  axillary, or hilar adenopathy.     UPPER ABDOMEN: Small left renal cysts which are benign and need no  further follow-up.     MUSCULOSKELETAL: Degenerative changes of the spine.                                                                      IMPRESSION:  1.  Large saddle embolism is noted with extension into the lobar and  segmental branches of all lobes. Severe right heart strain. Recommend  consultation with interventional radiology.     Findings were discussed with Dr. Ron Mitchell at 11:52 am on 11/03/2023.     DK CALLE MD       US PELVIC COMPLETE WITH TRANSVAGINAL  10/21/2019 10:27 PM       HISTORY: Pelvic pain for one week, history of fibroid removed eight years ago.      COMPARISON: None.     FINDINGS: Transabdominal and endovaginal imaging was performed. The uterus measures 7.5 x 4.6 x 6.0 cm. At least seven uterine fibroids are identified. A posterior lower uterine segment fibroid measures 1.4 x 1.2 cm. An anterior fundal fibroid measures   1.6 x 1.3 cm. A posterior fundal fibroid measures 1.6 x 1.6 cm. The endometrium measures 0.2 cm in thickness. The ovaries are normal in size and appearance bilaterally. No adnexal mass. No free pelvic fluid.                                                                      IMPRESSION:  1. Multiple uterine fibroids.  2. The endometrium is normal in thickness at 0.2 cm. The endometrium is obscured near the uterine fundus and submucosal fibroid is a possibility.           : ACR BI-RADS Category 1: Negative    RECOMMENDATION: Follow Up Imaging in 12 months - Bilateral    The results and recommendations of this examination will be communicated  to the  patient.  Narrative     This result has an attachment that is not available.  MM MAMMOGRAM SCREENING BILAT W CAD performed on 10/25/18    Compared to: 10/26/2016 MM Mammogram Screening Bilat W CAD, 08/25/2015 MM  Mammogram Screening Bilat W CAD, and 10/28/2008 Foreign Image(S) Mammogram      FINDINGS: Bilateral screening mammogram was performed with the assistance  of Computer-Aided Detection. The breasts are almost entirely fatty.    There is no radiographic evidence of malignancy.

## 2024-06-19 NOTE — PROGRESS NOTES
Virginia is a 78 year old who is being evaluated via a billable telephone visit.    What phone number would you like to be contacted at? [phone]  How would you like to obtain your AVS? MyChart  Originating Location (pt. Location): Home    Distant Location (provider location):  On-site    Subjective   Virginia is a 78 year old, presenting for the following health issues:  Telephone ([phone]/(Lab 5/20, US 6/7, ECHO 6/17) Follow-up to 11/21/23 .SEB /)      Objective           Vitals:  No vitals were obtained today due to virtual visit.    PJ JAMES

## 2024-06-21 ENCOUNTER — TELEPHONE (OUTPATIENT)
Dept: OTHER | Facility: CLINIC | Age: 79
End: 2024-06-21
Payer: MEDICARE

## 2024-06-21 DIAGNOSIS — I82.432 ACUTE DEEP VEIN THROMBOSIS (DVT) OF POPLITEAL VEIN OF LEFT LOWER EXTREMITY (H): ICD-10-CM

## 2024-06-21 DIAGNOSIS — I26.09 OTHER ACUTE PULMONARY EMBOLISM WITH ACUTE COR PULMONALE (H): Primary | ICD-10-CM

## 2024-06-21 DIAGNOSIS — I71.21 ANEURYSM OF ASCENDING AORTA WITHOUT RUPTURE (H): ICD-10-CM

## 2024-06-21 NOTE — TELEPHONE ENCOUNTER
Routing to scheduling to coordinate the following:    Non-fasting d-dimer  Virtual visit 3 days after lab  Please schedule this in 3 weeks     Appt note: Follow up with d-dimer    Jen GARCIA RN    [facility]  Office: [phone]  Fax: [phone]

## 2024-06-25 NOTE — TELEPHONE ENCOUNTER
[facility]    Who is the name of the provider?:  RACHEL CUEVAS   What is the location you see this provider at/preferred location?: [place]  Person calling / Facility: Suma Iverson  Phone number:  [phone] (home)   Nurse call back needed:  yes     Reason for call:    Patient would like to speak with a nurse to understand why she needs to have this blood test.  She is wondering if Dr. Cuevas is looking for something that is of concern that she should be aware of.    Please call patient.      Pharmacy location:     [id number] - [place], [place] - [address] & [id number] - [place], [place] - [address]  [id number] - [place], [place] - [address]  [facility] - [facility], [place] - [facility] [facility]  Outside Imaging: n/a   Can we leave a detailed message on this number?  YES     6/25/2024, 8:03 AM

## 2024-06-25 NOTE — TELEPHONE ENCOUNTER
LOV 6/19/24 with Dr. Panda:  Plan: Discontinue rivaroxaban ANTICOAGULANT (XARELTO) 20 MG TABS Presently. RTC in one month for a repeat d dimer. RTC one week later to discuss continued cessation of AC at that time. Wear knee high compression.Get colonoscopy once off of AC. This will be mentioned to her at her next visit.     I returned call to [place] and she states she does not recall being told at her LOV with Dr. Panda why she needs another d-dimer. I explained since he stopped the Xarelto we need to re-check the d dimer in a few weeks to make sure it is still within normal range. She verbalized understanding.    Jen GARCIA RN    [facility]  Office: [phone]  Fax: [phone]

## 2024-07-08 ENCOUNTER — LAB (OUTPATIENT)
Dept: LAB | Facility: CLINIC | Age: 79
End: 2024-07-08
Payer: MEDICARE

## 2024-07-08 DIAGNOSIS — I26.09 OTHER ACUTE PULMONARY EMBOLISM WITH ACUTE COR PULMONALE (H): ICD-10-CM

## 2024-07-08 DIAGNOSIS — I82.432 ACUTE DEEP VEIN THROMBOSIS (DVT) OF POPLITEAL VEIN OF LEFT LOWER EXTREMITY (H): ICD-10-CM

## 2024-07-08 DIAGNOSIS — I26.02 SADDLE EMBOLUS OF PULMONARY ARTERY WITH ACUTE COR PULMONALE, UNSPECIFIED CHRONICITY (H): ICD-10-CM

## 2024-07-08 LAB — D DIMER PPP FEU-MCNC: 0.78 UG/ML FEU (ref 0–0.5)

## 2024-07-08 PROCEDURE — 85379 FIBRIN DEGRADATION QUANT: CPT

## 2024-07-08 PROCEDURE — 36415 COLL VENOUS BLD VENIPUNCTURE: CPT

## 2024-07-11 ENCOUNTER — VIRTUAL VISIT (OUTPATIENT)
Dept: OTHER | Facility: CLINIC | Age: 79
End: 2024-07-11
Attending: INTERNAL MEDICINE
Payer: MEDICARE

## 2024-07-11 DIAGNOSIS — I71.21 ANEURYSM OF ASCENDING AORTA WITHOUT RUPTURE (H): ICD-10-CM

## 2024-07-11 DIAGNOSIS — I82.432 ACUTE DEEP VEIN THROMBOSIS (DVT) OF POPLITEAL VEIN OF LEFT LOWER EXTREMITY (H): ICD-10-CM

## 2024-07-11 DIAGNOSIS — I26.09 OTHER ACUTE PULMONARY EMBOLISM WITH ACUTE COR PULMONALE (H): ICD-10-CM

## 2024-07-11 DIAGNOSIS — I10 ESSENTIAL HYPERTENSION: Primary | ICD-10-CM

## 2024-07-11 PROCEDURE — 99443 PR PHYSICIAN TELEPHONE EVALUATION 21-30 MIN: CPT | Mod: 93 | Performed by: INTERNAL MEDICINE

## 2024-07-11 RX ORDER — LISINOPRIL 20 MG/1
20 TABLET ORAL DAILY
Qty: 90 TABLET | Refills: 3 | Status: SHIPPED | OUTPATIENT
Start: 2024-07-11

## 2024-07-11 NOTE — PROGRESS NOTES
VASCULAR MEDICAL FOLLOW UP ASSESSMENT  REFERRAL SOURCE: Hospitalist     REASON FOR CONSULT: First lifetime VTE presenting as a saddle PE arising form a duplicated left popliteal vein DVT within one month of a COVID infection without post COVID DVT prophylaxis or compression hosiery utilization.              A/P:     (I26.02)First lifetime VTE presenting as a saddle PE arising form a duplicated left popliteal vein DVT within one month of a COVID infection without post COVID DVT prophylaxis or compression hosiery utilization and concomitant prolonged inactivity     Comment: This was her first lifetime VTE event. She has a maternal h/o DVT, w/o other FHx DVT/PE. No recurrent miscarriages. Provoked due to antecedent COVID infection, COVID infection associated inactivity, and a congenitally duplicated left popliteal vein. No HC w/u warranted other than cancer screening as below. She had been very inactive and would have benefited from PT. However her and her  left  for [place] for six months shortly after having been last seen on 11/21/2023. She was instructed to get mammogram while in [place]. She had polyps previously so Cologuard screening while on AC is not an option. As she has no fe def anemia presently, the need for colonoscopy is less urgent. TTE of 6/17/2024 reveals no pulmonary htn and resolution of right heart strain. Incidentally discovered was an ATAA measuring 38 mm. US Lower Extremity Venous Duplex Bilateral of 6/7/24 reveals resolution of DVT.  D dimer of 5/21/2024 was normal. We therefore discontinued Xarelto on 6/19/24. Her 07/08/24 d dimer is mildly elevated at 0.78     Plan: Remain off of rivaroxaban presently. RTC in one month for a repeat d dimer given trivial current elevation in d dimer. . RTC one week later to discuss continued cessation of AC at that time. Wear knee high compression.Get colonoscopy now that she is  off of AC.              (I82.432) Acute deep vein thrombosis (DVT) of  popliteal vein of left lower extremity (H)     Comment: As above     Plan: Compression Sleeve/Stocking Order for DME -         ONLY FOR DME           (I71.21) Aneurysm of ascending aorta without rupture (H24)  (primary encounter diagnosis)     Comment: Asx. Surgical treatment not warranted.      Plan: Check TTE in 06/2025, RTC one week later.  Goal BP < 120/70. Not at that goal so we will increase lisinopril to 20 mg daily.       24 minutes total medical care on today's date.     MD location: office  Pt location: home     Phone call start: 16:10  Phone call end: 16:34           HPI:      Suma Iverson is a 78 year old female who developed cough and shortness of breath.  She was seen in an emergency room in October 14 where a CT of the chest was negative but she tested positive for COVID. She was given Paxlovid and sent home.  On October 20 she went back to the emergency room with diarrhea was evaluated and sent home.  She came back to the emergency room on October 28 with abdominal pain.  CT showed already diagnosed diverticulosis and fibroids.  Other evaluation was negative.  She was sent home once again.  She then returned yet again to the emergency room complaining of a cough and shortness of breath.  Her vital signs were stable heart rate was a little on the elevated side in the 90s oxygen saturation was 99%.  She appeared tachypneic.  EKG showed sinus rhythm and an incomplete right bundle branch block T wave inversions new in V2 and V3. CT PE showed large saddle embolism with extension into the lobar and segmental branches of all lobes. Severe right heart strain was seen. LLE US showed short segment occlusive deep vein thrombus in duplicated left popliteal vein. No DVT in RLE. IR pulmonary artery suction thrombectomy completed 11/4. DOAC initiated 11/6 AM. She was discharged on this on 11/07/2023, and has been doing suboptimally with c/o continuing nausea limiting food intake and activity. She has  essentially been in bed for the last six weeks. Her  states she is not  SOB , has no BOB, or leg swelling since her hospitalization. She was not given an Rx for compression hosiery. She has been COVID boosted on 11/10/2023.     Her mother had a DVT in her 50s which appeared to be unprovoked. She has not had a mammogram since 2018. Her last colonoscopy was 11 years ago and revealed several polyps but no cancer. She does not have an fe deficiency anemia.      Review Of Systems     Skin: negative  Eyes: negative  Ears/Nose/Throat: negative  Respiratory: No shortness of breath, dyspnea on exertion, cough, or hemoptysis  Cardiovascular: negative  Gastrointestinal: negative  Genitourinary: negative  Musculoskeletal: negative  Neurologic: negative  Psychiatric: positive for prominent anxiety  Hematologic/Lymphatic/Immunologic: negative  Endocrine: negative        PAST MEDICAL HISTORY:                  Past Medical History           Past Medical History:   Diagnosis Date    Generalized anxiety disorder      HTN (hypertension)      Hyperlipidemia LDL goal <100      Obesity      BRANDON (obstructive sleep apnea)      Peptic ulcer disease      Seizure disorder (H)      Type 2 diabetes mellitus (H)      Uterine leiomyoma, unspecified location              PAST SURGICAL HISTORY:                  Past Surgical History             Past Surgical History:   Procedure Laterality Date    AS REMV CATARACT INTRACAP,INSERT LENS        IR PULMONARY ANGIOGRAM BILATERAL   11/4/2023    UTERINE FIBROID SURGERY                CURRENT MEDICATIONS:                  Current Outpatient Prescriptions               Current Outpatient Medications   Medication Sig Dispense Refill    albuterol (PROAIR HFA/PROVENTIL HFA/VENTOLIN HFA) 108 (90 Base) MCG/ACT inhaler Inhale 2 puffs into the lungs every 6 hours as needed for shortness of breath, wheezing or cough 18 g 0    benzonatate (TESSALON) 100 MG capsule Take 1 capsule (100 mg) by mouth 3 times  daily as needed for cough 30 capsule 4    diazepam (VALIUM) 5 MG tablet Take 5 mg by mouth 2 times daily as needed for anxiety        famotidine (PEPCID) 40 MG tablet Take 40 mg by mouth at bedtime        lacosamide (VIMPAT) 200 MG TABS tablet Take 200 mg by mouth 2 times daily        ondansetron (ZOFRAN ODT) 4 MG ODT tab Take 1 tablet (4 mg) by mouth every 6 hours as needed for nausea or vomiting 30 tablet 0    pantoprazole (PROTONIX) 20 MG EC tablet Take 20 mg by mouth daily        [START ON 11/27/2023] rivaroxaban ANTICOAGULANT (XARELTO) 20 MG TABS tablet Take 1 tablet (20 mg) by mouth daily (with dinner) 30 tablet 3    Rivaroxaban ANTICOAGULANT 15 & 20 MG TBPK Starter Therapy Pack Take 15 mg by mouth 2 times daily (with meals) for 21 days, THEN 20 mg daily with food for 39 days. 51 each 0    simvastatin (ZOCOR) 40 MG tablet Take 40 mg by mouth at bedtime        vitamin D2 (ERGOCALCIFEROL) 69832 units (1250 mcg) capsule Take 50,000 Units by mouth once a week        zonisamide (ZONEGRAN) 100 MG capsule Take by mouth 2 times daily 2 capsules in the morning and 1 capsule in the evening                ALLERGIES:                  Allergies             Allergies   Allergen Reactions    Levetiracetam Anxiety and Other (See Comments)       Other Reaction(s): Anxiety, Irritable            SOCIAL HISTORY:                  Social History   Social History                Socioeconomic History    Marital status:        Spouse name: Not on file    Number of children: Not on file    Years of education: Not on file    Highest education level: Not on file   Occupational History    Not on file   Tobacco Use    Smoking status: Former       Types: Cigarettes    Smokeless tobacco: Never   Vaping Use    Vaping Use: Never used   Substance and Sexual Activity    Alcohol use: Not on file    Drug use: Not on file    Sexual activity: Not on file   Other Topics Concern    Not on file   Social History Narrative    Not on file       Social Determinants of Health              Financial Resource Strain: Low Risk  (11/10/2023)     Financial Resource Strain      Within the past 12 months, have you or your family members you live with been unable to get utilities (heat, electricity) when it was really needed?: No   Food Insecurity: Low Risk  (11/10/2023)     Food Insecurity      Within the past 12 months, did you worry that your food would run out before you got money to buy more?: No      Within the past 12 months, did the food you bought just not last and you didn t have money to get more?: No   Transportation Needs: Low Risk  (11/10/2023)     Transportation Needs      Within the past 12 months, has lack of transportation kept you from medical appointments, getting your medicines, non-medical meetings or appointments, work, or from getting things that you need?: No   Physical Activity: Not on file   Stress: Not on file   Social Connections: Not on file   Interpersonal Safety: Not on file   Housing Stability: Low Risk  (11/10/2023)     Housing Stability      Do you have housing? : Yes      Are you worried about losing your housing?: No            FAMILY HISTORY:                   Family History   No family history on file.            Physical exam Reveals:     O/P: WNL  HEENT: WNL  NECK: No JVD, thyromegaly, or lymphadenopathy  HEART: RRR, no murmurs, gallops, or rubs  LUNGS: CTA bilaterally without rales, wheezes, or rhonchi  GI: NABS, nondistended, nontender, soft  EXT:without cyanosis, clubbing, or edema, legs not taut  NEURO: nonfocal  : no flank tenderness        Name: KERI COLEMAN  MRN: [id number]  : 1945  Study Date: 2023 08:47 AM  Age: 78 yrs  Gender: Female  Patient Location: [facility]  Reason For Study: Pulmonary Emboli  Ordering Physician: MAT SNYDER  Referring Physician: JUVENAL CARDENAS  Performed By: Char Singh     BSA: 1.9 m2  Height: 64 in  Weight: 184 lb  HR: 81  BP: 146/88  mmHg  ______________________________________________________________________________  Procedure  Complete Echo Adult. Optison (NDC #6029-1252) given intravenously.  ______________________________________________________________________________  Interpretation Summary     The right ventricular systolic function is moderate to severely reduced.  The right ventricle is moderately dilated.  Flattened septum is consistent with RV pressure overload.  Left ventricular systolic function is normal.  The visual ejection fraction is 60-65%.  The left ventricle is normal in size.  Ascending aorta dilatation is present.(38m) normal 37  Doppler interrogation does not demonstrate signficant stenosis or  insufficiency involving cardiac valves     No old studies for comparison  ______________________________________________________________________________  Left Ventricle  The left ventricle is normal in size. There is normal left ventricular wall  thickness. Left ventricular systolic function is normal. The visual ejection  fraction is 60-65%. Grade I or early diastolic dysfunction. Flattened septum  is consistent with RV pressure overload. There is no thrombus seen in the left  ventricle.     Right Ventricle  The right ventricle is moderately dilated. There is normal right ventricular  wall thickness. The right ventricular systolic function is moderate to  severely reduced. There is no mass or thrombus in the right ventricle.     Atria  Normal left atrial size. Right atrial size is normal. There is no atrial shunt  seen. The left atrial appendage is not well visualized.     Mitral Valve  The mitral valve leaflets appear normal. There is no evidence of stenosis,  fluttering, or prolapse. There is no mitral regurgitation noted. There is no  mitral valve stenosis.     Tricuspid Valve  Normal tricuspid valve. The right ventricular systolic pressure is  approximated at 22.3 mmHg plus the right atrial pressure. Right ventricle  systolic  pressure estimate normal. There is mild (1+) tricuspid regurgitation.  There is no tricuspid stenosis.     Aortic Valve  The aortic valve is trileaflet. No aortic regurgitation is present. No aortic  stenosis is present.     Pulmonic Valve  Normal pulmonic valve. There is no pulmonic valvular regurgitation. There is  no pulmonic valvular stenosis.     Vessels  The aortic root is normal size. Ascending aorta dilatation is present. The  inferior vena cava is normal.     Pericardium  The pericardium appears normal. There is no pleural effusion.     Rhythm  Sinus rhythm was noted.  ______________________________________________________________________________  MMode/2D Measurements & Calculations  IVSd: 1.0 cm     LVIDd: 3.7 cm  LVIDs: 2.6 cm  LVPWd: 1.0 cm  FS: 28.5 %  LV mass(C)d: 112.5 grams  LV mass(C)dI: 59.6 grams/m2  Ao root diam: 3.7 cm  LA dimension: 2.4 cm  asc Aorta Diam: 3.8 cm  LA/Ao: 0.67  LVOT diam: 2.0 cm  LVOT area: 3.1 cm2  Ao root diam index Ht(cm/m): 2.3  Ao root diam index BSA (cm/m2): 1.9  Asc Ao diam index BSA (cm/m2): 2.0  Asc Ao diam index Ht(cm/m): 2.3  LA Volume (BP): 37.3 ml     LA Volume Index (BP): 19.7 ml/m2  RWT: 0.54  TAPSE: 1.2 cm     Doppler Measurements & Calculations  MV E max norm: 48.2 cm/sec  MV A max norm: 93.3 cm/sec  MV E/A: 0.52  MV dec time: 0.28 sec  Ao V2 max: 120.0 cm/sec  Ao max P.0 mmHg  Ao V2 mean: 82.1 cm/sec  Ao mean PG: 3.0 mmHg  Ao V2 VTI: 21.5 cm  HERIBERTO(I,D): 2.2 cm2  HERIBERTO(V,D): 2.5 cm2  LV V1 max PG: 3.8 mmHg  LV V1 max: 96.2 cm/sec  LV V1 VTI: 15.4 cm  SV(LVOT): 47.9 ml  SI(LVOT): 25.4 ml/m2  PA acc time: 0.11 sec  TR max norm: 236.1 cm/sec  TR max P.3 mmHg  AV Norm Ratio (DI): 0.80  HERIBERTO Index (cm2/m2): 1.2  E/E' av.9     Lateral E/e': 6.8  Medial E/e': 7.0  RV S Norm: 12.4 cm/sec     ______________________________________________________________________________  Report approved by: Dr. Zeus Peoples 2023 09:48 AM              [facility]    LOCATION: [facility]  DATE: 11/4/2023     PROCEDURE:  1. US-GUIDED ACCESS RIGHT COMMON FEMORAL VEIN  2. RIGHT ILIOCAVAL VENOGRAPHY  3. MAIN PULMONARY ANGIOGRAPHY  4. MAIN PULMONARY MANOMETRY  5. SELECTIVE LEFT PULMONARY ANGIOGRAPHY  6. SELECTIVE RIGHT PULMONARY ANGIOGRAPHY  7. LEFT PULMONARY MECHANICAL THROMBECTOMY (EXTIRPATION OF MATTER)  8. RIGHT PULMONARY MECHANICAL THROMBECTOMY (EXTIRPATION OF MATTER)  9. VASCULAR CLOSURE DEVICE USE     INTERVENTIONAL RADIOLOGIST: Sergio Washington MD     INDICATION: 78-year-old female with large volume pulmonary emboli  including saddle with imaging and clinical evidence of right heart  strain compatible with intermediate high risk/submassive risk  stratification. IR was consulted for pulmonary angiogram and possible  thrombectomy.     CONSENT: The risks, benefits and alternatives of the procedure were  discussed with the patient or representative in detail. All questions  were answered. Informed consent was given to proceed with the  procedure.     MODERATE SEDATION: Versed 4 mg IV; Fentanyl 250 mcg IV. During the  time out, immediately prior to the administration of medications, the  patient was reassessed for adequacy to receive conscious sedation.   Under physician supervision, Versed and fentanyl were administered for  moderate sedation. Pulse oximetry, heart rate and blood pressure were  continuously monitored by an independent trained observer. The  physician spent 81 minutes of face-to-face sedation time with the  patient.     CONTRAST: 76 mL of Isovue-300 intravascular.  ANTIBIOTICS: None.  ADDITIONAL MEDICATIONS: None.     FLUOROSCOPIC TIME: 24.1 minutes.  RADIATION DOSE: Air Kerma: 195.4 mGy.     COMPLICATIONS: No immediate complications.     UNIVERSAL PROTOCOL: The operative site was marked and any prior  imaging was reviewed. Required items including blood products,  implants, devices and special equipment was made available. Patient  identity was  confirmed either verbally, with demographic information,  hospital assigned identification or other identification markers. A  timeout was performed immediately prior to the procedure.     STERILE BARRIER TECHNIQUE: Maximum sterile barrier technique was used.  Cutaneous antisepsis was performed at the operative site with  application of 2% chlorhexidine and large sterile drape. Prior to the  procedure, the  and assistant performed hand hygiene and wore  hat, mask, sterile gown, and sterile gloves during the entire  procedure.     PROCEDURE:    Patient was positioned supine on the procedure table, and the  bilateral groins were prepped and draped in the usual sterile fashion.  Using real-time ultrasound guidance, access was achieved into the side  common femoral vein and conversion was made for a 7 [place] vascular  sheath. A permanent image was stored. Utilizing preclose technique,  two Perclose suture devices were deployed at the venous access site,  and the 7 [place] sheath was reinserted. Right iliocaval venography was  performed through the venous access sheath.     Under live fluoroscopy, a 7 [place] angled pigtail catheter was  manipulated through the right heart and into the main pulmonary  artery. Main pulmonary artery pressure measurements were obtained.  Main pulmonary angiography was performed.     An angled catheter and stiff Glidewire were manipulated into a  lingular left pulmonary artery. Over a short-flop stiff Amplatz wire,  the sheath was exchanged for a 24 [place] Inari Intri24 sheath and  advanced into the suprarenal inferior vena cava. Through the sheath a  24 [place] Inari [id number] device was advanced into the central  left pulmonary artery. Mechanical suction thrombectomy was performed  with extirpation of matter using multiple passes. Repeat left  pulmonary angiogram was obtained. The angled catheter and wire were  used to select a distal left lower lobe pulmonary artery. Over  the  stiff Amplatz wire, a 20 [place] Inari T20 curve catheter was advanced  coaxially into the central left lower lobe pulmonary artery.  Mechanical suction thrombectomy was performed with extirpation of  matter using multiple passes. Post thrombectomy left pulmonary  angiography was performed.     An angled catheter and stiff Glidewire were manipulated into the  distal right interlobar pulmonary artery. Over the Amplatz wire, the  Inari [id number] was advanced into the central right interlobar  pulmonary artery. Mechanical suction thrombectomy was performed with  extirpation of matter using multiple passes. Post-thrombectomy right  pulmonary angiogram was obtained.     The pigtail cathter was advanced over the wire into the main pulmonary  artery. Post-thrombectomy main pulmonary artery pressure measurements  were obtained.     The catheters and sheath were removed and hemostasis achieved via the  Perclose suture devices and additional manual compression.     FINDINGS:  1. Ultrasound demonstrates a patent and fully compressible right  common femoral vein. A permanent image was stored.  2. Right iliocaval venography demonstrates patency without evidence of  thrombus.  3. Initial pulmonary angiography demonstrates large burden bilateral  central and lobar pulmonary emboli with saddle, and corresponding to  recent CT-PE study.  4. Following mechanical suction thrombectomy, completion left  pulmonary angiogram shows near-complete resolution of central  pulmonary emboli burden and significantly improved perfusion  throughout the left lung. Persistent trace volume segmental and  subsegmental emboli in the inferior lingula.  5. Following mechanical suction thrombectomy, completion right  pulmonary angiogram shows near-complete resolution of central  pulmonary emboli and significantly improved perfusion throughout the  right lung. Persistent trace volume segmental and subsegmental emboli  in the right middle lobe.  6.  External thrombus examination demonstrates large volume  predominantly acute thrombus.     PULMONARY ARTERIAL PRESSURES (mmHg):  Pre-thrombectomy: 41/5 (mean 20)  Post-thrombectomy: 34/10 (mean 19)                                                                      IMPRESSION:   Pulmonary angiogram and successful bilateral mechanical suction  thrombectomy, with significantly improved angiographic result and  decreased pulmonary artery systolic pressures as described.     COURTNEY BURTON MD            US LOWER EXTREMITY VENOUS DUPLEX BILATERAL 11/3/2023 1:33 PM     CLINICAL HISTORY: Shortness of breath Eval for lower extremity clots  please  TECHNIQUE: Venous Duplex ultrasound of bilateral lower extremities  with and without compression, augmentation and duplex. Color flow and  spectral Doppler with waveform analysis performed.     COMPARISON: None.     FINDINGS: Exam includes the common femoral, femoral, popliteal veins  as well as segmentally visualized deep calf veins and greater  saphenous vein.      RIGHT: No deep vein thrombosis. No superficial thrombophlebitis. No  popliteal cyst.     LEFT: Duplicated venous system at the level of the knee with short  segment occlusive thrombus in one of the popliteal veins. The other  popliteal vein is patent. No superficial thrombophlebitis. No  popliteal cyst.                                                                      IMPRESSION:  1.  Short segment occlusive deep vein thrombus in duplicated left  popliteal vein.  2.  No deep venous thrombosis in the right lower extremity.     JAMILA BAUMANN MD       CT CHEST PULMONARY EMBOLISM W CONTRAST 11/3/2023 11:37 AM     CLINICAL HISTORY: Patent with new exertional shortness of breath after  being diagnosed [facility] Covid 10/14  TECHNIQUE: CT angiogram chest during arterial phase injection IV  contrast. 2D and 3D MIP reconstructions were performed by the CT  technologist. Dose reduction techniques were used.      CONTRAST:  77mL Isovue-370     COMPARISON: 10/14/2023     FINDINGS:  ANGIOGRAM CHEST: Large saddle pulmonary embolism is noted which  extends into the lobar and segmental branches of all lobes. Severe  right heart strain. Thoracic aorta is negative for dissection.     LUNGS AND PLEURA: Minimal atelectasis is noted at the lung bases. No  focal consolidation or effusion.     MEDIASTINUM/AXILLAE: Heart is normal in size. No mediastinal,  axillary, or hilar adenopathy.     UPPER ABDOMEN: Small left renal cysts which are benign and need no  further follow-up.     MUSCULOSKELETAL: Degenerative changes of the spine.                                                                      IMPRESSION:  1.  Large saddle embolism is noted with extension into the lobar and  segmental branches of all lobes. Severe right heart strain. Recommend  consultation with interventional radiology.     Findings were discussed with Dr. Ron Mitchell at 11:52 am on 11/03/2023.     DK CALLE MD       US PELVIC COMPLETE WITH TRANSVAGINAL  10/21/2019 10:27 PM       HISTORY: Pelvic pain for one week, history of fibroid removed eight years ago.      COMPARISON: None.     FINDINGS: Transabdominal and endovaginal imaging was performed. The uterus measures 7.5 x 4.6 x 6.0 cm. At least seven uterine fibroids are identified. A posterior lower uterine segment fibroid measures 1.4 x 1.2 cm. An anterior fundal fibroid measures   1.6 x 1.3 cm. A posterior fundal fibroid measures 1.6 x 1.6 cm. The endometrium measures 0.2 cm in thickness. The ovaries are normal in size and appearance bilaterally. No adnexal mass. No free pelvic fluid.                                                                      IMPRESSION:  1. Multiple uterine fibroids.  2. The endometrium is normal in thickness at 0.2 cm. The endometrium is obscured near the uterine fundus and submucosal fibroid is a possibility.           : ACR BI-RADS Category 1: Negative    RECOMMENDATION: Follow Up Imaging  in 12 months - Bilateral    The results and recommendations of this examination will be communicated  to the patient.  Narrative     This result has an attachment that is not available.  MM MAMMOGRAM SCREENING BILAT W CAD performed on 10/25/18    Compared to: 10/26/2016 MM Mammogram Screening Bilat W CAD, 08/25/2015 MM  Mammogram Screening Bilat W CAD, and 10/28/2008 Foreign Image(S) Mammogram      FINDINGS: Bilateral screening mammogram was performed with the assistance  of Computer-Aided Detection. The breasts are almost entirely fatty.    There is no radiographic evidence of malignancy.

## 2024-07-11 NOTE — PROGRESS NOTES
Virginia is a 78 year old who is being evaluated via a billable telephone visit.    What phone number would you like to be contacted at? [phone]  How would you like to obtain your AVS? [place]  Originating Location (pt. Location): Home    Distant Location (provider location):  On-site    Subjective   Virginia is a 78 year old, presenting for the following health issues:  Telephone ([phone]/Telephone Visit - Follow up with d-dimer /)      Objective           Vitals:  No vitals were obtained today due to virtual visit.      PJ JAMES

## 2024-07-16 ENCOUNTER — TELEPHONE (OUTPATIENT)
Dept: OTHER | Facility: CLINIC | Age: 79
End: 2024-07-16
Payer: MEDICARE

## 2024-07-16 NOTE — TELEPHONE ENCOUNTER
Routing to scheduling to coordinate the following:  Fasting labs  Virtual follow up a few days after lab  Please schedule this in 1 month     Appt note: Follow up to 7/11/24    Jen GARCIA, KRISTEN    [facility]  Office: [phone]  Fax: [phone]

## 2024-08-13 ENCOUNTER — LAB (OUTPATIENT)
Dept: LAB | Facility: CLINIC | Age: 79
End: 2024-08-13
Payer: MEDICARE

## 2024-08-13 ENCOUNTER — DOCUMENTATION ONLY (OUTPATIENT)
Dept: OTHER | Facility: CLINIC | Age: 79
End: 2024-08-13

## 2024-08-13 DIAGNOSIS — I26.09 OTHER ACUTE PULMONARY EMBOLISM WITH ACUTE COR PULMONALE (H): ICD-10-CM

## 2024-08-13 DIAGNOSIS — I82.432 ACUTE DEEP VEIN THROMBOSIS (DVT) OF POPLITEAL VEIN OF LEFT LOWER EXTREMITY (H): ICD-10-CM

## 2024-08-13 LAB
D DIMER PPP FEU-MCNC: 0.71 UG/ML FEU (ref 0–0.5)
HOLD SPECIMEN: NORMAL
HOLD SPECIMEN: NORMAL

## 2024-08-13 PROCEDURE — 36415 COLL VENOUS BLD VENIPUNCTURE: CPT

## 2024-08-13 PROCEDURE — 85379 FIBRIN DEGRADATION QUANT: CPT

## 2024-08-13 NOTE — PROGRESS NOTES
Suma Iverson has an upcoming lab appointment:    Future Appointments   Date Time Provider Department Center   8/13/2024 10:15 AM CR LAB CRLABR CR   8/15/2024  4:10 PM Bentley Panda MD [facility]     Appt notes state fasting labs. There is no order available. Please review and place either future orders or HMPO (Review of Health Maintenance Protocol Orders), as appropriate. EXTRA tubes were drawn. Please ADD ON to these when placing orders.    Health Maintenance Due   Topic    LIPID     MICROALBUMIN     ANNUAL REVIEW OF HM ORDERS     HEPATITIS C SCREENING     A1C     BMP      Thank You,    Codie REBOLLEDO III  [facility]  P: [phone]

## 2024-08-15 ENCOUNTER — VIRTUAL VISIT (OUTPATIENT)
Dept: OTHER | Facility: CLINIC | Age: 79
End: 2024-08-15
Attending: INTERNAL MEDICINE
Payer: MEDICARE

## 2024-08-15 DIAGNOSIS — I26.09 OTHER ACUTE PULMONARY EMBOLISM WITH ACUTE COR PULMONALE (H): Primary | ICD-10-CM

## 2024-08-15 PROCEDURE — 99443 PR PHYSICIAN TELEPHONE EVALUATION 21-30 MIN: CPT | Mod: 93 | Performed by: INTERNAL MEDICINE

## 2024-08-15 NOTE — PROGRESS NOTES
VASCULAR MEDICAL FOLLOW UP ASSESSMENT  REFERRAL SOURCE: Hospitalist     REASON FOR CONSULT: First lifetime VTE presenting as a saddle PE arising form a duplicated left popliteal vein DVT within one month of a COVID infection without post COVID DVT prophylaxis or compression hosiery utilization.              A/P:     (I26.02)First lifetime VTE presenting as a saddle PE arising from a duplicated left popliteal vein DVT within one month of a COVID infection without post COVID DVT prophylaxis or compression hosiery utilization and concomitant prolonged inactivity     Comment: This was her first lifetime VTE event. She has a maternal h/o DVT, w/o other FHx DVT/PE. No recurrent miscarriages. Provoked due to antecedent COVID infection, COVID infection associated inactivity, and a congenitally duplicated left popliteal vein. No HC w/u warranted other than cancer screening as below. She had been very inactive and would have benefited from PT. However her and her  left  for [place] for six months shortly after having been last seen on 11/21/2023. She was instructed to get mammogram while in [place]. She had polyps previously so Cologuard screening while on AC is not an option. As she has no fe def anemia presently, the need for colonoscopy is less urgent. TTE of 6/17/2024 reveals no pulmonary htn and resolution of right heart strain. Incidentally discovered was an ATAA measuring 38 mm. US Lower Extremity Venous Duplex Bilateral of 6/7/24 reveals resolution of DVT.  D dimer of 5/21/2024 was normal. We therefore discontinued Xarelto on 6/19/24. Her 07/08/24 d dimer was mildly elevated at 0.78 so we had her remain off of rivaroxaban at that time. Repeat d dimer of 08/13/2024 was 0.71. She has no new sxs of leg swelling or SOB/BOB..       Plan: Remain off of rivaroxaban. Check d dimer in one month again . RTC one week later to discuss continued cessation of AC at that time. If d dimer not appreciably increased form recent  baseline and no new interval events after three months, stop monitoring d dimer at that time. If d dimer increases dramatically , then resume Xarelto. Wear knee high compression.Get colonoscopy now that she is  off of AC.              (I82.432) Acute deep vein thrombosis (DVT) of popliteal vein of left lower extremity (H)     Comment: As above     Plan: Compression Sleeve/Stocking Order for DME -         ONLY FOR DME           (I71.21) Aneurysm of ascending aorta without rupture (H24)  (primary encounter diagnosis)     Comment: Asx. Surgical treatment not warranted. SBP < 120     Plan: Check TTE in 06/2025, RTC one week later.  Goal BP < 120/70. Not at that goal so we will increase lisinopril to 20 mg daily.       24 minutes total medical care on today's date.     MD location: office  Pt location: home     Phone call start: 16:20  Phone call end: 16:41           HPI:      Suma Iverson is a 78 year old female who developed cough and shortness of breath.  She was seen in an emergency room in October 14 where a CT of the chest was negative but she tested positive for COVID. She was given Paxlovid and sent home.  On October 20 she went back to the emergency room with diarrhea was evaluated and sent home.  She came back to the emergency room on October 28 with abdominal pain.  CT showed already diagnosed diverticulosis and fibroids.  Other evaluation was negative.  She was sent home once again.  She then returned yet again to the emergency room complaining of a cough and shortness of breath.  Her vital signs were stable heart rate was a little on the elevated side in the 90s oxygen saturation was 99%.  She appeared tachypneic.  EKG showed sinus rhythm and an incomplete right bundle branch block T wave inversions new in V2 and V3. CT PE showed large saddle embolism with extension into the lobar and segmental branches of all lobes. Severe right heart strain was seen. LLE US showed short segment occlusive deep vein  thrombus in duplicated left popliteal vein. No DVT in RLE. IR pulmonary artery suction thrombectomy completed 11/4. DOAC initiated 11/6 AM. She was discharged on this on 11/07/2023, and has been doing suboptimally with c/o continuing nausea limiting food intake and activity. She has essentially been in bed for the last six weeks. Her  states she is not  SOB , has no BOB, or leg swelling since her hospitalization. She was not given an Rx for compression hosiery. She has been COVID boosted on 11/10/2023.     Her mother had a DVT in her 50s which appeared to be unprovoked. She has not had a mammogram since 2018. Her last colonoscopy was 11 years ago and revealed several polyps but no cancer. She does not have an fe deficiency anemia.      Review Of Systems     Skin: negative  Eyes: negative  Ears/Nose/Throat: negative  Respiratory: No shortness of breath, dyspnea on exertion, cough, or hemoptysis  Cardiovascular: negative  Gastrointestinal: negative  Genitourinary: negative  Musculoskeletal: negative  Neurologic: negative  Psychiatric: positive for prominent anxiety  Hematologic/Lymphatic/Immunologic: negative  Endocrine: negative        PAST MEDICAL HISTORY:                  Past Medical History           Past Medical History:   Diagnosis Date    Generalized anxiety disorder      HTN (hypertension)      Hyperlipidemia LDL goal <100      Obesity      BRANDON (obstructive sleep apnea)      Peptic ulcer disease      Seizure disorder (H)      Type 2 diabetes mellitus (H)      Uterine leiomyoma, unspecified location              PAST SURGICAL HISTORY:                  Past Surgical History             Past Surgical History:   Procedure Laterality Date    AS REMV CATARACT INTRACAP,INSERT LENS        IR PULMONARY ANGIOGRAM BILATERAL   11/4/2023    UTERINE FIBROID SURGERY                CURRENT MEDICATIONS:                  Current Outpatient Prescriptions               Current Outpatient Medications   Medication Sig  Dispense Refill    albuterol (PROAIR HFA/PROVENTIL HFA/VENTOLIN HFA) 108 (90 Base) MCG/ACT inhaler Inhale 2 puffs into the lungs every 6 hours as needed for shortness of breath, wheezing or cough 18 g 0    benzonatate (TESSALON) 100 MG capsule Take 1 capsule (100 mg) by mouth 3 times daily as needed for cough 30 capsule 4    diazepam (VALIUM) 5 MG tablet Take 5 mg by mouth 2 times daily as needed for anxiety        famotidine (PEPCID) 40 MG tablet Take 40 mg by mouth at bedtime        lacosamide (VIMPAT) 200 MG TABS tablet Take 200 mg by mouth 2 times daily        ondansetron (ZOFRAN ODT) 4 MG ODT tab Take 1 tablet (4 mg) by mouth every 6 hours as needed for nausea or vomiting 30 tablet 0    pantoprazole (PROTONIX) 20 MG EC tablet Take 20 mg by mouth daily        [START ON 11/27/2023] rivaroxaban ANTICOAGULANT (XARELTO) 20 MG TABS tablet Take 1 tablet (20 mg) by mouth daily (with dinner) 30 tablet 3    Rivaroxaban ANTICOAGULANT 15 & 20 MG TBPK Starter Therapy Pack Take 15 mg by mouth 2 times daily (with meals) for 21 days, THEN 20 mg daily with food for 39 days. 51 each 0    simvastatin (ZOCOR) 40 MG tablet Take 40 mg by mouth at bedtime        vitamin D2 (ERGOCALCIFEROL) 17012 units (1250 mcg) capsule Take 50,000 Units by mouth once a week        zonisamide (ZONEGRAN) 100 MG capsule Take by mouth 2 times daily 2 capsules in the morning and 1 capsule in the evening                ALLERGIES:                  Allergies             Allergies   Allergen Reactions    Levetiracetam Anxiety and Other (See Comments)       Other Reaction(s): Anxiety, Irritable            SOCIAL HISTORY:                  Social History   Social History                Socioeconomic History    Marital status:        Spouse name: Not on file    Number of children: Not on file    Years of education: Not on file    Highest education level: Not on file   Occupational History    Not on file   Tobacco Use    Smoking status: Former       Types:  Cigarettes    Smokeless tobacco: Never   Vaping Use    Vaping Use: Never used   Substance and Sexual Activity    Alcohol use: Not on file    Drug use: Not on file    Sexual activity: Not on file   Other Topics Concern    Not on file   Social History Narrative    Not on file      Social Determinants of Health              Financial Resource Strain: Low Risk  (11/10/2023)     Financial Resource Strain      Within the past 12 months, have you or your family members you live with been unable to get utilities (heat, electricity) when it was really needed?: No   Food Insecurity: Low Risk  (11/10/2023)     Food Insecurity      Within the past 12 months, did you worry that your food would run out before you got money to buy more?: No      Within the past 12 months, did the food you bought just not last and you didn t have money to get more?: No   Transportation Needs: Low Risk  (11/10/2023)     Transportation Needs      Within the past 12 months, has lack of transportation kept you from medical appointments, getting your medicines, non-medical meetings or appointments, work, or from getting things that you need?: No   Physical Activity: Not on file   Stress: Not on file   Social Connections: Not on file   Interpersonal Safety: Not on file   Housing Stability: Low Risk  (11/10/2023)     Housing Stability      Do you have housing? : Yes      Are you worried about losing your housing?: No            FAMILY HISTORY:                   Family History   No family history on file.            Physical exam Reveals:     O/P: WNL  HEENT: WNL  NECK: No JVD, thyromegaly, or lymphadenopathy  HEART: RRR, no murmurs, gallops, or rubs  LUNGS: CTA bilaterally without rales, wheezes, or rhonchi  GI: NABS, nondistended, nontender, soft  EXT:without cyanosis, clubbing, or edema, legs not taut  NEURO: nonfocal  : no flank tenderness        Name: KERI COLEMAN  MRN: [id number]  : 1945  Study Date: 2023 08:47 AM  Age: 78  yrs  Gender: Female  Patient Location: [facility]  Reason For Study: Pulmonary Emboli  Ordering Physician: MAT SNYDER  Referring Physician: JUVENAL CARDENAS  Performed By: Char Singh     BSA: 1.9 m2  Height: 64 in  Weight: 184 lb  HR: 81  BP: 146/88 mmHg  ______________________________________________________________________________  Procedure  Complete Echo Adult. Optison (NDC #[id number]) given intravenously.  ______________________________________________________________________________  Interpretation Summary     The right ventricular systolic function is moderate to severely reduced.  The right ventricle is moderately dilated.  Flattened septum is consistent with RV pressure overload.  Left ventricular systolic function is normal.  The visual ejection fraction is 60-65%.  The left ventricle is normal in size.  Ascending aorta dilatation is present.(38m) normal 37  Doppler interrogation does not demonstrate signficant stenosis or  insufficiency involving cardiac valves     No old studies for comparison  ______________________________________________________________________________  Left Ventricle  The left ventricle is normal in size. There is normal left ventricular wall  thickness. Left ventricular systolic function is normal. The visual ejection  fraction is 60-65%. Grade I or early diastolic dysfunction. Flattened septum  is consistent with RV pressure overload. There is no thrombus seen in the left  ventricle.     Right Ventricle  The right ventricle is moderately dilated. There is normal right ventricular  wall thickness. The right ventricular systolic function is moderate to  severely reduced. There is no mass or thrombus in the right ventricle.     Atria  Normal left atrial size. Right atrial size is normal. There is no atrial shunt  seen. The left atrial appendage is not well visualized.     Mitral Valve  The mitral valve leaflets appear normal. There is no evidence of stenosis,  fluttering,  or prolapse. There is no mitral regurgitation noted. There is no  mitral valve stenosis.     Tricuspid Valve  Normal tricuspid valve. The right ventricular systolic pressure is  approximated at 22.3 mmHg plus the right atrial pressure. Right ventricle  systolic pressure estimate normal. There is mild (1+) tricuspid regurgitation.  There is no tricuspid stenosis.     Aortic Valve  The aortic valve is trileaflet. No aortic regurgitation is present. No aortic  stenosis is present.     Pulmonic Valve  Normal pulmonic valve. There is no pulmonic valvular regurgitation. There is  no pulmonic valvular stenosis.     Vessels  The aortic root is normal size. Ascending aorta dilatation is present. The  inferior vena cava is normal.     Pericardium  The pericardium appears normal. There is no pleural effusion.     Rhythm  Sinus rhythm was noted.  ______________________________________________________________________________  MMode/2D Measurements & Calculations  IVSd: 1.0 cm     LVIDd: 3.7 cm  LVIDs: 2.6 cm  LVPWd: 1.0 cm  FS: 28.5 %  LV mass(C)d: 112.5 grams  LV mass(C)dI: 59.6 grams/m2  Ao root diam: 3.7 cm  LA dimension: 2.4 cm  asc Aorta Diam: 3.8 cm  LA/Ao: 0.67  LVOT diam: 2.0 cm  LVOT area: 3.1 cm2  Ao root diam index Ht(cm/m): 2.3  Ao root diam index BSA (cm/m2): 1.9  Asc Ao diam index BSA (cm/m2): 2.0  Asc Ao diam index Ht(cm/m): 2.3  LA Volume (BP): 37.3 ml     LA Volume Index (BP): 19.7 ml/m2  RWT: 0.54  TAPSE: 1.2 cm     Doppler Measurements & Calculations  MV E max norm: 48.2 cm/sec  MV A max norm: 93.3 cm/sec  MV E/A: 0.52  MV dec time: 0.28 sec  Ao V2 max: 120.0 cm/sec  Ao max P.0 mmHg  Ao V2 mean: 82.1 cm/sec  Ao mean PG: 3.0 mmHg  Ao V2 VTI: 21.5 cm  HERIBERTO(I,D): 2.2 cm2  HERIBERTO(V,D): 2.5 cm2  LV V1 max PG: 3.8 mmHg  LV V1 max: 96.2 cm/sec  LV V1 VTI: 15.4 cm  SV(LVOT): 47.9 ml  SI(LVOT): 25.4 ml/m2  PA acc time: 0.11 sec  TR max norm: 236.1 cm/sec  TR max P.3 mmHg  AV Norm Ratio (DI): 0.80  HERIBERTO Index (cm2/m2):  1.2  E/E' av.9     Lateral E/e': 6.8  Medial E/e': 7.0  RV S Norm: 12.4 cm/sec     ______________________________________________________________________________  Report approved by: Dr. Zeus Peoples 2023 09:48 AM              [facility]   LOCATION: [facility]  DATE: 2023     PROCEDURE:  1. US-GUIDED ACCESS RIGHT COMMON FEMORAL VEIN  2. RIGHT ILIOCAVAL VENOGRAPHY  3. MAIN PULMONARY ANGIOGRAPHY  4. MAIN PULMONARY MANOMETRY  5. SELECTIVE LEFT PULMONARY ANGIOGRAPHY  6. SELECTIVE RIGHT PULMONARY ANGIOGRAPHY  7. LEFT PULMONARY MECHANICAL THROMBECTOMY (EXTIRPATION OF MATTER)  8. RIGHT PULMONARY MECHANICAL THROMBECTOMY (EXTIRPATION OF MATTER)  9. VASCULAR CLOSURE DEVICE USE     INTERVENTIONAL RADIOLOGIST: Sergio Washington MD     INDICATION: 78-year-old female with large volume pulmonary emboli  including saddle with imaging and clinical evidence of right heart  strain compatible with intermediate high risk/submassive risk  stratification. IR was consulted for pulmonary angiogram and possible  thrombectomy.     CONSENT: The risks, benefits and alternatives of the procedure were  discussed with the patient or representative in detail. All questions  were answered. Informed consent was given to proceed with the  procedure.     MODERATE SEDATION: Versed 4 mg IV; Fentanyl 250 mcg IV. During the  time out, immediately prior to the administration of medications, the  patient was reassessed for adequacy to receive conscious sedation.   Under physician supervision, Versed and fentanyl were administered for  moderate sedation. Pulse oximetry, heart rate and blood pressure were  continuously monitored by an independent trained observer. The  physician spent 81 minutes of face-to-face sedation time with the  patient.     CONTRAST: 76 mL of Isovue-300 intravascular.  ANTIBIOTICS: None.  ADDITIONAL MEDICATIONS: None.     FLUOROSCOPIC TIME: 24.1 minutes.  RADIATION DOSE: Air Kerma: 195.4 mGy.      COMPLICATIONS: No immediate complications.     UNIVERSAL PROTOCOL: The operative site was marked and any prior  imaging was reviewed. Required items including blood products,  implants, devices and special equipment was made available. Patient  identity was confirmed either verbally, with demographic information,  hospital assigned identification or other identification markers. A  timeout was performed immediately prior to the procedure.     STERILE BARRIER TECHNIQUE: Maximum sterile barrier technique was used.  Cutaneous antisepsis was performed at the operative site with  application of 2% chlorhexidine and large sterile drape. Prior to the  procedure, the  and assistant performed hand hygiene and wore  hat, mask, sterile gown, and sterile gloves during the entire  procedure.     PROCEDURE:    Patient was positioned supine on the procedure table, and the  bilateral groins were prepped and draped in the usual sterile fashion.  Using real-time ultrasound guidance, access was achieved into the side  common femoral vein and conversion was made for a 7 [place]an vascular  sheath. A permanent image was stored. Utilizing preclose technique,  two Perclose suture devices were deployed at the venous access site,  and the 7 [place]an sheath was reinserted. Right iliocaval venography was  performed through the venous access sheath.     Under live fluoroscopy, a 7 [place]an angled pigtail catheter was  manipulated through the right heart and into the main pulmonary  artery. Main pulmonary artery pressure measurements were obtained.  Main pulmonary angiography was performed.     An angled catheter and stiff Glidewire were manipulated into a  lingular left pulmonary artery. Over a short-flop stiff Amplatz wire,  the sheath was exchanged for a 24 [place]an Inari Intri24 sheath and  advanced into the suprarenal inferior vena cava. Through the sheath a  24 [place] Inari [id number] device was advanced into the central  left  pulmonary artery. Mechanical suction thrombectomy was performed  with extirpation of matter using multiple passes. Repeat left  pulmonary angiogram was obtained. The angled catheter and wire were  used to select a distal left lower lobe pulmonary artery. Over the  stiff Amplatz wire, a 20 Tamazight Inari T20 curve catheter was advanced  coaxially into the central left lower lobe pulmonary artery.  Mechanical suction thrombectomy was performed with extirpation of  matter using multiple passes. Post thrombectomy left pulmonary  angiography was performed.     An angled catheter and stiff Glidewire were manipulated into the  distal right interlobar pulmonary artery. Over the Amplatz wire, the  Inari [id number] was advanced into the central right interlobar  pulmonary artery. Mechanical suction thrombectomy was performed with  extirpation of matter using multiple passes. Post-thrombectomy right  pulmonary angiogram was obtained.     The pigtail cathter was advanced over the wire into the main pulmonary  artery. Post-thrombectomy main pulmonary artery pressure measurements  were obtained.     The catheters and sheath were removed and hemostasis achieved via the  Perclose suture devices and additional manual compression.     FINDINGS:  1. Ultrasound demonstrates a patent and fully compressible right  common femoral vein. A permanent image was stored.  2. Right iliocaval venography demonstrates patency without evidence of  thrombus.  3. Initial pulmonary angiography demonstrates large burden bilateral  central and lobar pulmonary emboli with saddle, and corresponding to  recent CT-PE study.  4. Following mechanical suction thrombectomy, completion left  pulmonary angiogram shows near-complete resolution of central  pulmonary emboli burden and significantly improved perfusion  throughout the left lung. Persistent trace volume segmental and  subsegmental emboli in the inferior lingula.  5. Following mechanical suction  thrombectomy, completion right  pulmonary angiogram shows near-complete resolution of central  pulmonary emboli and significantly improved perfusion throughout the  right lung. Persistent trace volume segmental and subsegmental emboli  in the right middle lobe.  6. External thrombus examination demonstrates large volume  predominantly acute thrombus.     PULMONARY ARTERIAL PRESSURES (mmHg):  Pre-thrombectomy: 41/5 (mean 20)  Post-thrombectomy: 34/10 (mean 19)                                                                      IMPRESSION:   Pulmonary angiogram and successful bilateral mechanical suction  thrombectomy, with significantly improved angiographic result and  decreased pulmonary artery systolic pressures as described.     COURTNEY BURTON MD            US LOWER EXTREMITY VENOUS DUPLEX BILATERAL 11/3/2023 1:33 PM     CLINICAL HISTORY: Shortness of breath Eval for lower extremity clots  please  TECHNIQUE: Venous Duplex ultrasound of bilateral lower extremities  with and without compression, augmentation and duplex. Color flow and  spectral Doppler with waveform analysis performed.     COMPARISON: None.     FINDINGS: Exam includes the common femoral, femoral, popliteal veins  as well as segmentally visualized deep calf veins and greater  saphenous vein.      RIGHT: No deep vein thrombosis. No superficial thrombophlebitis. No  popliteal cyst.     LEFT: Duplicated venous system at the level of the knee with short  segment occlusive thrombus in one of the popliteal veins. The other  popliteal vein is patent. No superficial thrombophlebitis. No  popliteal cyst.                                                                      IMPRESSION:  1.  Short segment occlusive deep vein thrombus in duplicated left  popliteal vein.  2.  No deep venous thrombosis in the right lower extremity.     JAMLIA BAUMANN MD       CT CHEST PULMONARY EMBOLISM W CONTRAST 11/3/2023 11:37 AM     CLINICAL HISTORY: Patent with new  exertional shortness of breath after  being diagnosed [facility] Covid 10/14  TECHNIQUE: CT angiogram chest during arterial phase injection IV  contrast. 2D and 3D MIP reconstructions were performed by the CT  technologist. Dose reduction techniques were used.      CONTRAST: 77mL Isovue-370     COMPARISON: 10/14/2023     FINDINGS:  ANGIOGRAM CHEST: Large saddle pulmonary embolism is noted which  extends into the lobar and segmental branches of all lobes. Severe  right heart strain. Thoracic aorta is negative for dissection.     LUNGS AND PLEURA: Minimal atelectasis is noted at the lung bases. No  focal consolidation or effusion.     MEDIASTINUM/AXILLAE: Heart is normal in size. No mediastinal,  axillary, or hilar adenopathy.     UPPER ABDOMEN: Small left renal cysts which are benign and need no  further follow-up.     MUSCULOSKELETAL: Degenerative changes of the spine.                                                                      IMPRESSION:  1.  Large saddle embolism is noted with extension into the lobar and  segmental branches of all lobes. Severe right heart strain. Recommend  consultation with interventional radiology.     Findings were discussed with Dr. Ron Mitchell at 11:52 am on 11/03/2023.     DK CALLE MD       US PELVIC COMPLETE WITH TRANSVAGINAL  10/21/2019 10:27 PM       HISTORY: Pelvic pain for one week, history of fibroid removed eight years ago.      COMPARISON: None.     FINDINGS: Transabdominal and endovaginal imaging was performed. The uterus measures 7.5 x 4.6 x 6.0 cm. At least seven uterine fibroids are identified. A posterior lower uterine segment fibroid measures 1.4 x 1.2 cm. An anterior fundal fibroid measures   1.6 x 1.3 cm. A posterior fundal fibroid measures 1.6 x 1.6 cm. The endometrium measures 0.2 cm in thickness. The ovaries are normal in size and appearance bilaterally. No adnexal mass. No free pelvic fluid.                                                                       IMPRESSION:  1. Multiple uterine fibroids.  2. The endometrium is normal in thickness at 0.2 cm. The endometrium is obscured near the uterine fundus and submucosal fibroid is a possibility.           : ACR BI-RADS Category 1: Negative    RECOMMENDATION: Follow Up Imaging in 12 months - Bilateral    The results and recommendations of this examination will be communicated  to the patient.  Narrative     This result has an attachment that is not available.  MM MAMMOGRAM SCREENING BILAT W CAD performed on 10/25/18    Compared to: 10/26/2016 MM Mammogram Screening Bilat W CAD, 08/25/2015 MM  Mammogram Screening Bilat W CAD, and 10/28/2008 Foreign Image(S) Mammogram      FINDINGS: Bilateral screening mammogram was performed with the assistance  of Computer-Aided Detection. The breasts are almost entirely fatty.    There is no radiographic evidence of malignancy.

## 2024-08-15 NOTE — PROGRESS NOTES
Virginia is a 78 year old who is being evaluated via a billable telephone visit.    What phone number would you like to be contacted at?   [phone]          How would you like to obtain your AVS? Mail a copy    Sondra Du MA

## 2024-08-21 ENCOUNTER — TELEPHONE (OUTPATIENT)
Dept: OTHER | Facility: CLINIC | Age: 79
End: 2024-08-21
Payer: MEDICARE

## 2024-08-21 DIAGNOSIS — I82.432 ACUTE DEEP VEIN THROMBOSIS (DVT) OF POPLITEAL VEIN OF LEFT LOWER EXTREMITY (H): ICD-10-CM

## 2024-08-21 DIAGNOSIS — I26.09 OTHER ACUTE PULMONARY EMBOLISM WITH ACUTE COR PULMONALE (H): Primary | ICD-10-CM

## 2024-08-21 NOTE — TELEPHONE ENCOUNTER
Left voicemail for patient to call back to schedule appointment(s), provided telephone number for patient to call back to schedule.      Non-fasting labs in 1 month     Please schedule virtual follow up 1-2 days later to discuss labs     Appt note:  follow up to 8/15/24 appt.

## 2024-08-21 NOTE — TELEPHONE ENCOUNTER
Routing to scheduling to coordinate the following:  Non-fasting labs in 1 month    Please schedule virtual follow up 1-2 days later to discuss labs     Appt note:  follow up to 8/15/24 appt.     Mukesh Downs RN on 8/21/2024 at 11:03 AM

## 2024-08-30 ENCOUNTER — TRANSFERRED RECORDS (OUTPATIENT)
Dept: HEALTH INFORMATION MANAGEMENT | Facility: CLINIC | Age: 79
End: 2024-08-30
Payer: MEDICARE

## 2024-09-16 ENCOUNTER — LAB (OUTPATIENT)
Dept: LAB | Facility: CLINIC | Age: 79
End: 2024-09-16
Payer: MEDICARE

## 2024-09-16 DIAGNOSIS — I26.09 OTHER ACUTE PULMONARY EMBOLISM WITH ACUTE COR PULMONALE (H): ICD-10-CM

## 2024-09-16 LAB — D DIMER PPP FEU-MCNC: 0.76 UG/ML FEU (ref 0–0.5)

## 2024-09-16 PROCEDURE — 85379 FIBRIN DEGRADATION QUANT: CPT

## 2024-09-16 PROCEDURE — 36415 COLL VENOUS BLD VENIPUNCTURE: CPT

## 2024-09-18 ENCOUNTER — VIRTUAL VISIT (OUTPATIENT)
Dept: OTHER | Facility: CLINIC | Age: 79
End: 2024-09-18
Attending: INTERNAL MEDICINE
Payer: MEDICARE

## 2024-09-18 DIAGNOSIS — I26.09 OTHER ACUTE PULMONARY EMBOLISM WITH ACUTE COR PULMONALE (H): ICD-10-CM

## 2024-09-18 DIAGNOSIS — I82.432 ACUTE DEEP VEIN THROMBOSIS (DVT) OF POPLITEAL VEIN OF LEFT LOWER EXTREMITY (H): ICD-10-CM

## 2024-09-18 PROCEDURE — 99443 PR PHYSICIAN TELEPHONE EVALUATION 21-30 MIN: CPT | Mod: 93 | Performed by: INTERNAL MEDICINE

## 2024-09-18 NOTE — PROGRESS NOTES
Virginia is a 79 year old who is being evaluated via a billable telephone visit.    What phone number would you like to be contacted at?   [phone]        How would you like to obtain your AVS? Mail a copy  Sondra Du MA

## 2024-09-18 NOTE — PROGRESS NOTES
VASCULAR MEDICAL FOLLOW UP ASSESSMENT  REFERRAL SOURCE: Hospitalist     REASON FOR CONSULT: First lifetime VTE presenting as a saddle PE arising form a duplicated left popliteal vein DVT within one month of a COVID infection without post COVID DVT prophylaxis or compression hosiery utilization.              A/P:     (I26.02)First lifetime VTE presenting as a saddle PE arising from a duplicated left popliteal vein DVT within one month of a COVID infection without post COVID DVT prophylaxis or compression hosiery utilization and concomitant prolonged inactivity     Comment: This was her first lifetime VTE event. She has a maternal h/o DVT, w/o other FHx DVT/PE. No recurrent miscarriages. Provoked due to antecedent COVID infection, COVID infection associated inactivity, and a congenitally duplicated left popliteal vein. No HC w/u warranted other than cancer screening as below. She had been very inactive and would have benefited from PT. However her and her  left  for [place] for six months shortly after having been last seen on 11/21/2023. She was instructed to get mammogram while in [place]. She had polyps previously so Cologuard screening while on AC is not an option. As she has no fe def anemia presently, the need for colonoscopy is less urgent. TTE of 6/17/2024 reveals no pulmonary htn and resolution of right heart strain. Incidentally discovered was an ATAA measuring 38 mm. US Lower Extremity Venous Duplex Bilateral of 6/7/24 reveals resolution of DVT.  D dimer of 5/21/2024 was normal. We therefore discontinued Xarelto on 6/19/24. Her 07/08/24 d dimer was mildly elevated at 0.78 so we had her remain off of rivaroxaban at that time. Repeat d dimer of 08/13/2024 was 0.71. She has no new sxs of leg swelling or SOB/BOB. Her d dimer of 09/16/2024 was 0.76.        Plan: Remain off of rivaroxaban. Stop checking  d dimer. Wear knee high compression.Get colonoscopy now that she is  off of AC.              (I82.432)  Acute deep vein thrombosis (DVT) of popliteal vein of left lower extremity (H)     Comment: As above     Plan: Compression Sleeve/Stocking Order for DME -         ONLY FOR DME           (I71.21) Aneurysm of ascending aorta without rupture (H24)  (primary encounter diagnosis)     Comment: Asx. Surgical treatment not warranted. SBP < 120     Plan: Check TTE in 06/2025, RTC one week later.  Goal BP < 120/70.      24 minutes total medical care on today's date.     MD location: office  Pt location: home     Phone call start: 16:01  Phone call end: 16:22           HPI:      Suma Iverson is a 78 year old female who developed cough and shortness of breath.  She was seen in an emergency room in October 14 where a CT of the chest was negative but she tested positive for COVID. She was given Paxlovid and sent home.  On October 20 she went back to the emergency room with diarrhea was evaluated and sent home.  She came back to the emergency room on October 28 with abdominal pain.  CT showed already diagnosed diverticulosis and fibroids.  Other evaluation was negative.  She was sent home once again.  She then returned yet again to the emergency room complaining of a cough and shortness of breath.  Her vital signs were stable heart rate was a little on the elevated side in the 90s oxygen saturation was 99%.  She appeared tachypneic.  EKG showed sinus rhythm and an incomplete right bundle branch block T wave inversions new in V2 and V3. CT PE showed large saddle embolism with extension into the lobar and segmental branches of all lobes. Severe right heart strain was seen. LLE US showed short segment occlusive deep vein thrombus in duplicated left popliteal vein. No DVT in RLE. IR pulmonary artery suction thrombectomy completed 11/4. DOAC initiated 11/6 AM. She was discharged on this on 11/07/2023, and has been doing suboptimally with c/o continuing nausea limiting food intake and activity. She has essentially been in bed for  the last six weeks. Her  states she is not  SOB , has no BOB, or leg swelling since her hospitalization. She was not given an Rx for compression hosiery. She has been COVID boosted on 11/10/2023.     Her mother had a DVT in her 50s which appeared to be unprovoked. She has not had a mammogram since 2018. Her last colonoscopy was 11 years ago and revealed several polyps but no cancer. She does not have an fe deficiency anemia.      Review Of Systems     Skin: negative  Eyes: negative  Ears/Nose/Throat: negative  Respiratory: No shortness of breath, dyspnea on exertion, cough, or hemoptysis  Cardiovascular: negative  Gastrointestinal: negative  Genitourinary: negative  Musculoskeletal: negative  Neurologic: negative  Psychiatric: positive for prominent anxiety  Hematologic/Lymphatic/Immunologic: negative  Endocrine: negative        PAST MEDICAL HISTORY:                  Past Medical History           Past Medical History:   Diagnosis Date    Generalized anxiety disorder      HTN (hypertension)      Hyperlipidemia LDL goal <100      Obesity      BRANDON (obstructive sleep apnea)      Peptic ulcer disease      Seizure disorder (H)      Type 2 diabetes mellitus (H)      Uterine leiomyoma, unspecified location              PAST SURGICAL HISTORY:                  Past Surgical History             Past Surgical History:   Procedure Laterality Date    AS REMV CATARACT INTRACAP,INSERT LENS        IR PULMONARY ANGIOGRAM BILATERAL   11/4/2023    UTERINE FIBROID SURGERY                CURRENT MEDICATIONS:                  Current Outpatient Prescriptions               Current Outpatient Medications   Medication Sig Dispense Refill    albuterol (PROAIR HFA/PROVENTIL HFA/VENTOLIN HFA) 108 (90 Base) MCG/ACT inhaler Inhale 2 puffs into the lungs every 6 hours as needed for shortness of breath, wheezing or cough 18 g 0    benzonatate (TESSALON) 100 MG capsule Take 1 capsule (100 mg) by mouth 3 times daily as needed for cough 30  capsule 4    diazepam (VALIUM) 5 MG tablet Take 5 mg by mouth 2 times daily as needed for anxiety        famotidine (PEPCID) 40 MG tablet Take 40 mg by mouth at bedtime        lacosamide (VIMPAT) 200 MG TABS tablet Take 200 mg by mouth 2 times daily        ondansetron (ZOFRAN ODT) 4 MG ODT tab Take 1 tablet (4 mg) by mouth every 6 hours as needed for nausea or vomiting 30 tablet 0    pantoprazole (PROTONIX) 20 MG EC tablet Take 20 mg by mouth daily        [START ON 11/27/2023] rivaroxaban ANTICOAGULANT (XARELTO) 20 MG TABS tablet Take 1 tablet (20 mg) by mouth daily (with dinner) 30 tablet 3    Rivaroxaban ANTICOAGULANT 15 & 20 MG TBPK Starter Therapy Pack Take 15 mg by mouth 2 times daily (with meals) for 21 days, THEN 20 mg daily with food for 39 days. 51 each 0    simvastatin (ZOCOR) 40 MG tablet Take 40 mg by mouth at bedtime        vitamin D2 (ERGOCALCIFEROL) 22090 units (1250 mcg) capsule Take 50,000 Units by mouth once a week        zonisamide (ZONEGRAN) 100 MG capsule Take by mouth 2 times daily 2 capsules in the morning and 1 capsule in the evening                ALLERGIES:                  Allergies             Allergies   Allergen Reactions    Levetiracetam Anxiety and Other (See Comments)       Other Reaction(s): Anxiety, Irritable            SOCIAL HISTORY:                  Social History   Social History                Socioeconomic History    Marital status:        Spouse name: Not on file    Number of children: Not on file    Years of education: Not on file    Highest education level: Not on file   Occupational History    Not on file   Tobacco Use    Smoking status: Former       Types: Cigarettes    Smokeless tobacco: Never   Vaping Use    Vaping Use: Never used   Substance and Sexual Activity    Alcohol use: Not on file    Drug use: Not on file    Sexual activity: Not on file   Other Topics Concern    Not on file   Social History Narrative    Not on file      Social Determinants of Health               Financial Resource Strain: Low Risk  (11/10/2023)     Financial Resource Strain      Within the past 12 months, have you or your family members you live with been unable to get utilities (heat, electricity) when it was really needed?: No   Food Insecurity: Low Risk  (11/10/2023)     Food Insecurity      Within the past 12 months, did you worry that your food would run out before you got money to buy more?: No      Within the past 12 months, did the food you bought just not last and you didn t have money to get more?: No   Transportation Needs: Low Risk  (11/10/2023)     Transportation Needs      Within the past 12 months, has lack of transportation kept you from medical appointments, getting your medicines, non-medical meetings or appointments, work, or from getting things that you need?: No   Physical Activity: Not on file   Stress: Not on file   Social Connections: Not on file   Interpersonal Safety: Not on file   Housing Stability: Low Risk  (11/10/2023)     Housing Stability      Do you have housing? : Yes      Are you worried about losing your housing?: No            FAMILY HISTORY:                   Family History   No family history on file.            Physical exam Reveals:     O/P: WNL  HEENT: WNL  NECK: No JVD, thyromegaly, or lymphadenopathy  HEART: RRR, no murmurs, gallops, or rubs  LUNGS: CTA bilaterally without rales, wheezes, or rhonchi  GI: NABS, nondistended, nontender, soft  EXT:without cyanosis, clubbing, or edema, legs not taut  NEURO: nonfocal  : no flank tenderness        Name: KERI COLEMAN  MRN: [id number]  : 1945  Study Date: 2023 08:47 AM  Age: 78 yrs  Gender: Female  Patient Location: [facility]  Reason For Study: Pulmonary Emboli  Ordering Physician: MAT SNYDER  Referring Physician: JUVENAL CARDENAS  Performed By: Char Singh     BSA: 1.9 m2  Height: 64 in  Weight: 184 lb  HR: 81  BP: 146/88  mmHg  ______________________________________________________________________________  Procedure  Complete Echo Adult. Optison (NDC #[id number]) given intravenously.  ______________________________________________________________________________  Interpretation Summary     The right ventricular systolic function is moderate to severely reduced.  The right ventricle is moderately dilated.  Flattened septum is consistent with RV pressure overload.  Left ventricular systolic function is normal.  The visual ejection fraction is 60-65%.  The left ventricle is normal in size.  Ascending aorta dilatation is present.(38m) normal 37  Doppler interrogation does not demonstrate signficant stenosis or  insufficiency involving cardiac valves     No old studies for comparison  ______________________________________________________________________________  Left Ventricle  The left ventricle is normal in size. There is normal left ventricular wall  thickness. Left ventricular systolic function is normal. The visual ejection  fraction is 60-65%. Grade I or early diastolic dysfunction. Flattened septum  is consistent with RV pressure overload. There is no thrombus seen in the left  ventricle.     Right Ventricle  The right ventricle is moderately dilated. There is normal right ventricular  wall thickness. The right ventricular systolic function is moderate to  severely reduced. There is no mass or thrombus in the right ventricle.     Atria  Normal left atrial size. Right atrial size is normal. There is no atrial shunt  seen. The left atrial appendage is not well visualized.     Mitral Valve  The mitral valve leaflets appear normal. There is no evidence of stenosis,  fluttering, or prolapse. There is no mitral regurgitation noted. There is no  mitral valve stenosis.     Tricuspid Valve  Normal tricuspid valve. The right ventricular systolic pressure is  approximated at 22.3 mmHg plus the right atrial pressure. Right ventricle  systolic  pressure estimate normal. There is mild (1+) tricuspid regurgitation.  There is no tricuspid stenosis.     Aortic Valve  The aortic valve is trileaflet. No aortic regurgitation is present. No aortic  stenosis is present.     Pulmonic Valve  Normal pulmonic valve. There is no pulmonic valvular regurgitation. There is  no pulmonic valvular stenosis.     Vessels  The aortic root is normal size. Ascending aorta dilatation is present. The  inferior vena cava is normal.     Pericardium  The pericardium appears normal. There is no pleural effusion.     Rhythm  Sinus rhythm was noted.  ______________________________________________________________________________  MMode/2D Measurements & Calculations  IVSd: 1.0 cm     LVIDd: 3.7 cm  LVIDs: 2.6 cm  LVPWd: 1.0 cm  FS: 28.5 %  LV mass(C)d: 112.5 grams  LV mass(C)dI: 59.6 grams/m2  Ao root diam: 3.7 cm  LA dimension: 2.4 cm  asc Aorta Diam: 3.8 cm  LA/Ao: 0.67  LVOT diam: 2.0 cm  LVOT area: 3.1 cm2  Ao root diam index Ht(cm/m): 2.3  Ao root diam index BSA (cm/m2): 1.9  Asc Ao diam index BSA (cm/m2): 2.0  Asc Ao diam index Ht(cm/m): 2.3  LA Volume (BP): 37.3 ml     LA Volume Index (BP): 19.7 ml/m2  RWT: 0.54  TAPSE: 1.2 cm     Doppler Measurements & Calculations  MV E max norm: 48.2 cm/sec  MV A max norm: 93.3 cm/sec  MV E/A: 0.52  MV dec time: 0.28 sec  Ao V2 max: 120.0 cm/sec  Ao max P.0 mmHg  Ao V2 mean: 82.1 cm/sec  Ao mean PG: 3.0 mmHg  Ao V2 VTI: 21.5 cm  HERIBERTO(I,D): 2.2 cm2  HERIBERTO(V,D): 2.5 cm2  LV V1 max PG: 3.8 mmHg  LV V1 max: 96.2 cm/sec  LV V1 VTI: 15.4 cm  SV(LVOT): 47.9 ml  SI(LVOT): 25.4 ml/m2  PA acc time: 0.11 sec  TR max norm: 236.1 cm/sec  TR max P.3 mmHg  AV Norm Ratio (DI): 0.80  HERIBERTO Index (cm2/m2): 1.2  E/E' av.9     Lateral E/e': 6.8  Medial E/e': 7.0  RV S Norm: 12.4 cm/sec     ______________________________________________________________________________  Report approved by: Dr. Zeus Peoples 2023 09:48 AM              [facility]    LOCATION: [facility]  DATE: 11/4/2023     PROCEDURE:  1. US-GUIDED ACCESS RIGHT COMMON FEMORAL VEIN  2. RIGHT ILIOCAVAL VENOGRAPHY  3. MAIN PULMONARY ANGIOGRAPHY  4. MAIN PULMONARY MANOMETRY  5. SELECTIVE LEFT PULMONARY ANGIOGRAPHY  6. SELECTIVE RIGHT PULMONARY ANGIOGRAPHY  7. LEFT PULMONARY MECHANICAL THROMBECTOMY (EXTIRPATION OF MATTER)  8. RIGHT PULMONARY MECHANICAL THROMBECTOMY (EXTIRPATION OF MATTER)  9. VASCULAR CLOSURE DEVICE USE     INTERVENTIONAL RADIOLOGIST: Sergio Washington MD     INDICATION: 78-year-old female with large volume pulmonary emboli  including saddle with imaging and clinical evidence of right heart  strain compatible with intermediate high risk/submassive risk  stratification. IR was consulted for pulmonary angiogram and possible  thrombectomy.     CONSENT: The risks, benefits and alternatives of the procedure were  discussed with the patient or representative in detail. All questions  were answered. Informed consent was given to proceed with the  procedure.     MODERATE SEDATION: Versed 4 mg IV; Fentanyl 250 mcg IV. During the  time out, immediately prior to the administration of medications, the  patient was reassessed for adequacy to receive conscious sedation.   Under physician supervision, Versed and fentanyl were administered for  moderate sedation. Pulse oximetry, heart rate and blood pressure were  continuously monitored by an independent trained observer. The  physician spent 81 minutes of face-to-face sedation time with the  patient.     CONTRAST: 76 mL of Isovue-300 intravascular.  ANTIBIOTICS: None.  ADDITIONAL MEDICATIONS: None.     FLUOROSCOPIC TIME: 24.1 minutes.  RADIATION DOSE: Air Kerma: 195.4 mGy.     COMPLICATIONS: No immediate complications.     UNIVERSAL PROTOCOL: The operative site was marked and any prior  imaging was reviewed. Required items including blood products,  implants, devices and special equipment was made available. Patient  identity was  confirmed either verbally, with demographic information,  hospital assigned identification or other identification markers. A  timeout was performed immediately prior to the procedure.     STERILE BARRIER TECHNIQUE: Maximum sterile barrier technique was used.  Cutaneous antisepsis was performed at the operative site with  application of 2% chlorhexidine and large sterile drape. Prior to the  procedure, the  and assistant performed hand hygiene and wore  hat, mask, sterile gown, and sterile gloves during the entire  procedure.     PROCEDURE:    Patient was positioned supine on the procedure table, and the  bilateral groins were prepped and draped in the usual sterile fashion.  Using real-time ultrasound guidance, access was achieved into the side  common femoral vein and conversion was made for a 7 Peruvian vascular  sheath. A permanent image was stored. Utilizing preclose technique,  two Perclose suture devices were deployed at the venous access site,  and the 7 Peruvian sheath was reinserted. Right iliocaval venography was  performed through the venous access sheath.     Under live fluoroscopy, a 7 Peruvian angled pigtail catheter was  manipulated through the right heart and into the main pulmonary  artery. Main pulmonary artery pressure measurements were obtained.  Main pulmonary angiography was performed.     An angled catheter and stiff Glidewire were manipulated into a  lingular left pulmonary artery. Over a short-flop stiff Amplatz wire,  the sheath was exchanged for a 24 Peruvian Inari Intri24 sheath and  advanced into the suprarenal inferior vena cava. Through the sheath a  24 Peruvian Inari [id number] device was advanced into the central  left pulmonary artery. Mechanical suction thrombectomy was performed  with extirpation of matter using multiple passes. Repeat left  pulmonary angiogram was obtained. The angled catheter and wire were  used to select a distal left lower lobe pulmonary artery. Over  the  stiff Amplatz wire, a 20 Thai Inari T20 curve catheter was advanced  coaxially into the central left lower lobe pulmonary artery.  Mechanical suction thrombectomy was performed with extirpation of  matter using multiple passes. Post thrombectomy left pulmonary  angiography was performed.     An angled catheter and stiff Glidewire were manipulated into the  distal right interlobar pulmonary artery. Over the Amplatz wire, the  Inari [id number] was advanced into the central right interlobar  pulmonary artery. Mechanical suction thrombectomy was performed with  extirpation of matter using multiple passes. Post-thrombectomy right  pulmonary angiogram was obtained.     The pigtail cathter was advanced over the wire into the main pulmonary  artery. Post-thrombectomy main pulmonary artery pressure measurements  were obtained.     The catheters and sheath were removed and hemostasis achieved via the  Perclose suture devices and additional manual compression.     FINDINGS:  1. Ultrasound demonstrates a patent and fully compressible right  common femoral vein. A permanent image was stored.  2. Right iliocaval venography demonstrates patency without evidence of  thrombus.  3. Initial pulmonary angiography demonstrates large burden bilateral  central and lobar pulmonary emboli with saddle, and corresponding to  recent CT-PE study.  4. Following mechanical suction thrombectomy, completion left  pulmonary angiogram shows near-complete resolution of central  pulmonary emboli burden and significantly improved perfusion  throughout the left lung. Persistent trace volume segmental and  subsegmental emboli in the inferior lingula.  5. Following mechanical suction thrombectomy, completion right  pulmonary angiogram shows near-complete resolution of central  pulmonary emboli and significantly improved perfusion throughout the  right lung. Persistent trace volume segmental and subsegmental emboli  in the right middle lobe.  6.  External thrombus examination demonstrates large volume  predominantly acute thrombus.     PULMONARY ARTERIAL PRESSURES (mmHg):  Pre-thrombectomy: 41/5 (mean 20)  Post-thrombectomy: 34/10 (mean 19)                                                                      IMPRESSION:   Pulmonary angiogram and successful bilateral mechanical suction  thrombectomy, with significantly improved angiographic result and  decreased pulmonary artery systolic pressures as described.     COURTNEY BURTON MD            US LOWER EXTREMITY VENOUS DUPLEX BILATERAL 11/3/2023 1:33 PM     CLINICAL HISTORY: Shortness of breath Eval for lower extremity clots  please  TECHNIQUE: Venous Duplex ultrasound of bilateral lower extremities  with and without compression, augmentation and duplex. Color flow and  spectral Doppler with waveform analysis performed.     COMPARISON: None.     FINDINGS: Exam includes the common femoral, femoral, popliteal veins  as well as segmentally visualized deep calf veins and greater  saphenous vein.      RIGHT: No deep vein thrombosis. No superficial thrombophlebitis. No  popliteal cyst.     LEFT: Duplicated venous system at the level of the knee with short  segment occlusive thrombus in one of the popliteal veins. The other  popliteal vein is patent. No superficial thrombophlebitis. No  popliteal cyst.                                                                      IMPRESSION:  1.  Short segment occlusive deep vein thrombus in duplicated left  popliteal vein.  2.  No deep venous thrombosis in the right lower extremity.     JAMILA BAUMANN MD       CT CHEST PULMONARY EMBOLISM W CONTRAST 11/3/2023 11:37 AM     CLINICAL HISTORY: Patent with new exertional shortness of breath after  being diagnosed [facility] Covid 10/14  TECHNIQUE: CT angiogram chest during arterial phase injection IV  contrast. 2D and 3D MIP reconstructions were performed by the CT  technologist. Dose reduction techniques were used.      CONTRAST:  77mL Isovue-370     COMPARISON: 10/14/2023     FINDINGS:  ANGIOGRAM CHEST: Large saddle pulmonary embolism is noted which  extends into the lobar and segmental branches of all lobes. Severe  right heart strain. Thoracic aorta is negative for dissection.     LUNGS AND PLEURA: Minimal atelectasis is noted at the lung bases. No  focal consolidation or effusion.     MEDIASTINUM/AXILLAE: Heart is normal in size. No mediastinal,  axillary, or hilar adenopathy.     UPPER ABDOMEN: Small left renal cysts which are benign and need no  further follow-up.     MUSCULOSKELETAL: Degenerative changes of the spine.                                                                      IMPRESSION:  1.  Large saddle embolism is noted with extension into the lobar and  segmental branches of all lobes. Severe right heart strain. Recommend  consultation with interventional radiology.     Findings were discussed with Dr. Ron Mitchell at 11:52 am on 11/03/2023.     DK CALLE MD       US PELVIC COMPLETE WITH TRANSVAGINAL  10/21/2019 10:27 PM       HISTORY: Pelvic pain for one week, history of fibroid removed eight years ago.      COMPARISON: None.     FINDINGS: Transabdominal and endovaginal imaging was performed. The uterus measures 7.5 x 4.6 x 6.0 cm. At least seven uterine fibroids are identified. A posterior lower uterine segment fibroid measures 1.4 x 1.2 cm. An anterior fundal fibroid measures   1.6 x 1.3 cm. A posterior fundal fibroid measures 1.6 x 1.6 cm. The endometrium measures 0.2 cm in thickness. The ovaries are normal in size and appearance bilaterally. No adnexal mass. No free pelvic fluid.                                                                      IMPRESSION:  1. Multiple uterine fibroids.  2. The endometrium is normal in thickness at 0.2 cm. The endometrium is obscured near the uterine fundus and submucosal fibroid is a possibility.           : ACR BI-RADS Category 1: Negative    RECOMMENDATION: Follow Up Imaging  in 12 months - Bilateral    The results and recommendations of this examination will be communicated  to the patient.  Narrative     This result has an attachment that is not available.  MM MAMMOGRAM SCREENING BILAT W CAD performed on 10/25/18    Compared to: 10/26/2016 MM Mammogram Screening Bilat W CAD, 08/25/2015 MM  Mammogram Screening Bilat W CAD, and 10/28/2008 Foreign Image(S) Mammogram      FINDINGS: Bilateral screening mammogram was performed with the assistance  of Computer-Aided Detection. The breasts are almost entirely fatty.    There is no radiographic evidence of malignancy.

## 2024-10-10 ENCOUNTER — TRANSFERRED RECORDS (OUTPATIENT)
Dept: HEALTH INFORMATION MANAGEMENT | Facility: CLINIC | Age: 79
End: 2024-10-10
Payer: MEDICARE

## 2024-11-30 NOTE — PLAN OF CARE
I was called by nursing staff to pronounce the patient at bedside.    General: Patient does not respond to stimuli  HEENT: Pupils are fixed and dilated, not reactive  Cardiovascular: No heart sounds auscultated  Respiratory: No breath sounds auscultated  Vascular: No carotid artery pulse palpated    Preliminary cause of death: Cardiopulmonary arrest  Date and time of death: 11/29/24 @ 2211     Patient's family was spoken to over the phone, condolences were provided.      ---Of note, this documentation is completed using the Dragon Dictation system (voice recognition software). There may be spelling and/or grammatical errors that were not corrected prior to final submission.---    Lizy Jacobsen, CNP-APRN  Pulmonary and Critical Care Medicine   [phone]      Pt stable from 7-11 PM with no changes in her current condition.  Right groin c/d/I post thrombectomy.  Heparin gtt infusing without problems, no bleeding noted.  Tele:SR. PHAM, plan of care is on going.

## 2025-01-26 NOTE — PROGRESS NOTES
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, unspecified chronicity (H)  Was given 21 days of xarelto  Will write xarelto 20 mg to be started once done with 15 mg, sent to [facility] in [place]  - rivaroxaban ANTICOAGULANT (XARELTO) 20 MG TABS tablet; Take 1 tablet (20 mg) by mouth daily (with dinner)  - benzonatate (TESSALON) 100 MG capsule; Take 1 capsule (100 mg) by mouth 3 times daily as needed for cough    Chronic cough  Refill benzonatate    Nasal congestion  Flonase PRN    High priority for 2019-nCoV vaccine  Given    HTN  Stable today  Follow up with PCP in [place] to monitor BP and resume if needed.       MED REC REQUIRED  Post Medication Reconciliation Status:   BMI:   Estimated body mass index is 32.34 kg/m  as calculated from the following:    Height as of this encounter: 1.626 m (5' 4\").    Weight as of this encounter: 85.5 kg (188 lb 6.4 oz).       Depression Screening Follow Up        11/10/2023    10:13 AM   PHQ   PHQ-9 Total Score 15   Q9: Thoughts of better off dead/self-harm past 2 weeks Not at all         Follow Up Actions Taken  Crisis resource information provided in After Visit Summary      Depression is situational because she can't leave for [place] ASAP.       Stephenie Zacarias MD  [facility] is a 78 year old, presenting for the following health issues:  Hospital F/U        11/10/2023    10:18 AM   Additional Questions   Roomed by Lisa Magill, CMA   Accompanied by  Demarcus         11/10/2023    10:18 AM   Patient Reported Additional Medications   Patient reports taking the following new medications NONE         Hospital Follow Up    Has vascular follow up 11/21    Will leave to [place] as soon as possible    Denies SOB/Chest Pain  Some nausea    Taking xarelto 15 mg po BID for 21 days then 20 mg po daily thereafter. Was recommended to take for 6 months. Sent xarelto 20 mg to [facility] in [place].     Discussed " "frequent breaks with driving to [place].     Wants to know if lisinopril should be resumed. Blood pressure is good today.  Follow up with PCP to monitor blood pressure and resume at a later time if BP is uncontrolled.    Nasal Congestion  Try flonase             11/9/2023     3:39 PM   Post Discharge Outreach   Admission Date 11/3/2023   Reason for Admission Large saddle embolism with extension into the lobar and segmental branches of all lobes  Short segment occlusive deep vein thrombus in duplicated left popliteal vein  Severe right heart strain  S/P mechanical thrombectomy 11/4   Recent COVID-19 infection - 10/14/2023  Hypertension   Obstructive sleep apnea   Seizure disorder  Type 2 diabetes mellitus   Anxiety  Abdominal discomfort suspect dyspepsia vs GERD   Discharge Date 11/7/2023   Discharge Diagnosis Large saddle embolism with extension into the lobar and segmental branches of all lobes  Short segment occlusive deep vein thrombus in duplicated left popliteal vein  Severe right heart strain  S/P mechanical thrombectomy 11/4   Recent COVID-19 infection - 10/14/2023  Hypertension   Obstructive sleep apnea   Seizure disorder  Type 2 diabetes mellitus   Anxiety  Abdominal discomfort suspect dyspepsia vs GERD   How are you doing now that you are home? \"My breathing is okay except when I start coughing, sometimes hard to catch my breath but not bad.\"   How are your symptoms? (Red Flag symptoms escalate to triage hotline per guidelines) Unchanged   Do you feel your condition is stable enough to be safe at home until your provider visit? Yes   Does the patient have their discharge instructions?  Yes   Does the patient have questions regarding their discharge instructions?  No   Were you started on any new medications or were there changes to any of your previous medications?  Yes   Does the patient have all of their medications? Yes   Do you have questions regarding any of your medications?  No   Discharge follow-up " [FreeTextEntry1] : 64 yo F with history of recurrent UTI requiring multiple courses of abx, most recently IV abx with PICC line Currently on no abx but was having dysuria last night Extensive workup done by Dr. Biggs - cysto and UDS Pt with atonic bladder but voids with Crede stating that she is not able to do CIC and refusing indwelling catheter Recent CT [facility]rogram from [facility] on 10/19/24 -  KIDNEYS/URETERS: Symmetric enhancement of the bilateral kidneys. Mild left hydroureteronephrosis. No right hydronephrosis. Slight thickening/enhancement of the left ureter and left renal pelvis. No CT evidence of pyelonephritis. No perinephric fluid collections. BLADDER: Markedly thickened urinary bladder wall, and mucosal hyperenhancement.  Referred by Dr. Biggs for left URS with possible biopsy  1/17/25 Interval history: Pt is s/p bladder biopsy and fulguration. Left ureteroscopy was also done concurrently and there were no areas of erythema or abnormality Did have some hydroureter but the ureteroscope advance up easily with no areas of stricture Here today for catheter removal  1/24/25 Interval histoy: Did notice some dysuria since catheter removal last week Voiding in her usual baseline Path - benign results (Bladder wall with partially denuded surface epithelium, lamina propria congestion  and prominent mixed inflammatory cell infiltrate consisting of lymphoplasmacytic cell population focally forming lymphoid follicles and granulocytes. There is no evidence of malignancy.) "appointment scheduled within 14 calendar days?  Yes   Discharge Follow Up Appointment Date 11/10/2023   Discharge Follow Up Appointment Scheduled with? Primary Care Provider     Hospital Follow-up Visit:    Hospital/Nursing Home/IP Rehab Facility: [facility]  Date of Admission: 11/03/2023  Date of Discharge: 11/07/2023  Reason(s) for Admission: cough and anxiety    Was your hospitalization related to COVID-19? No   Problems taking medications regularly:  swallowing   Medication changes since discharge: None  Problems adhering to non-medication therapy:  None    Summary of hospitalization:  [facility] discharge summary reviewed  Diagnostic Tests/Treatments reviewed.  Follow up needed: none  Other Healthcare Providers Involved in Patient s Care:         None  Update since discharge: improved.           11/10/2023    10:13 AM   PHQ   PHQ-9 Total Score 15   Q9: Thoughts of better off dead/self-harm past 2 weeks Not at all        Plan of care communicated with patient                 Review of Systems   All other systems reviewed and are negative.           Objective    /70 (BP Location: Right arm, Patient Position: Sitting, Cuff Size: Adult Regular)   Pulse 80   Temp 98.5  F (36.9  C) (Oral)   Resp 16   Ht 1.626 m (5' 4\")   Wt 85.5 kg (188 lb 6.4 oz)   SpO2 98%   BMI 32.34 kg/m    Body mass index is 32.34 kg/m .  Physical Exam  Constitutional:       Appearance: Normal appearance.   HENT:      Head: Normocephalic and atraumatic.      Right Ear: Tympanic membrane, ear canal and external ear normal.      Left Ear: Tympanic membrane, ear canal and external ear normal.      Nose: Nose normal.      Mouth/Throat:      Mouth: Mucous membranes are moist.      Pharynx: Oropharynx is clear.   Eyes:      Extraocular Movements: Extraocular movements intact.      Conjunctiva/sclera: Conjunctivae normal.      Pupils: Pupils are equal, round, and reactive to light. "   Cardiovascular:      Rate and Rhythm: Normal rate and regular rhythm.      Pulses: Normal pulses.      Heart sounds: Normal heart sounds.   Pulmonary:      Effort: Pulmonary effort is normal.      Breath sounds: Normal breath sounds.   Abdominal:      General: Abdomen is flat. Bowel sounds are normal.      Palpations: Abdomen is soft.   Musculoskeletal:         General: Normal range of motion.      Cervical back: Normal range of motion and neck supple.   Skin:     General: Skin is warm.      Capillary Refill: Capillary refill takes less than 2 seconds.   Neurological:      General: No focal deficit present.      Mental Status: She is alert and oriented to person, place, and time. Mental status is at baseline.   Psychiatric:         Mood and Affect: Mood normal.         Behavior: Behavior normal.         Thought Content: Thought content normal.         Judgment: Judgment normal.

## 2025-02-06 NOTE — TELEPHONE ENCOUNTER
Anemia Management Note - Reminder     Follow-up with anemia management service:    Per note in Epic re Retacrit PA;  Still pending review at this time. I was informed that the due date for a determination on an outcome is 2/27.   Called Miguel Angel with an update.             Latest Ref Rng & Units 5/3/2024 6/19/2024 7/15/2024 8/5/2024 8/29/2024 9/10/2024 1/16/2025   Anemia   Hemoglobin 13.3 - 17.7 g/dL 9.7  8.7  9.8  10.1  9.4  9.5  8.3    TSAT 15 - 46 % 28  34  13   33   20    Ferritin 31 - 409 ng/mL 297  317  313   392   592            Follow-up call date: 2/13/25    Janna Louis RN   Anemia Services  [facility]  [email or web address]   Office : [phone]  Fax: [phone]       [facility]    Who is the name of the provider?:  RACHEL CUEVAS   What is the location you see this provider at/preferred location?: [place]  Person calling / Facility: Suma Iverson  Phone number:  [phone] (home)   Nurse call back needed:  YES     Reason for call:  Patient, (sounded upset), asking for advice on a recently completed mamogram and why she is being asked to complete an echocardiogram.    Pharmacy location:     [id number] - [place], [place] - [address] [id number] - [place], [place] - [id number] - [place], [place] - [address]  [facility] - [facility] [place] - [address] [facility]  Outside Imaging: n/a   Can we leave a detailed message on this number?  YES     5/2/2024, 10:08 AM

## 2025-05-21 ENCOUNTER — TELEPHONE (OUTPATIENT)
Dept: OTHER | Facility: CLINIC | Age: 80
End: 2025-05-21
Payer: MEDICARE

## 2025-05-21 NOTE — TELEPHONE ENCOUNTER
Called patient to follow up after a reminder to schedule letter was returned to [facility].    Patient confirmed her address is the same and would like a call back to schedule in one week.        Echocardiogram  In person follow up 1 week after echo    Appt note: Follow up to 9/18/24

## 2025-06-03 NOTE — TELEPHONE ENCOUNTER
Left voicemail for patient to schedule appointment(s), stated this our 2nd attempt at scheduling and provided [facility] telephone number for patient to call back to schedule.

## 2025-06-25 ENCOUNTER — HOSPITAL ENCOUNTER (OUTPATIENT)
Dept: CARDIOLOGY | Facility: CLINIC | Age: 80
Discharge: HOME | End: 2025-06-25
Attending: INTERNAL MEDICINE
Payer: MEDICARE

## 2025-06-25 ENCOUNTER — RESULTS FOLLOW-UP (OUTPATIENT)
Dept: OTHER | Facility: CLINIC | Age: 80
End: 2025-06-25

## 2025-06-25 DIAGNOSIS — I71.21 ANEURYSM OF ASCENDING AORTA WITHOUT RUPTURE: ICD-10-CM

## 2025-06-25 LAB — LVEF ECHO: NORMAL

## 2025-06-25 PROCEDURE — 93306 TTE W/DOPPLER COMPLETE: CPT | Mod: 26 | Performed by: INTERNAL MEDICINE

## 2025-06-25 PROCEDURE — 93306 TTE W/DOPPLER COMPLETE: CPT

## 2025-07-14 DIAGNOSIS — I10 ESSENTIAL HYPERTENSION: ICD-10-CM

## 2025-07-14 PROBLEM — K26.9 DUODENAL ULCER WITHOUT HEMORRHAGE OR PERFORATION: Status: ACTIVE | Noted: 2025-07-14

## 2025-07-14 PROBLEM — F41.1 ANXIETY STATE: Status: ACTIVE | Noted: 2025-07-14

## 2025-07-14 PROBLEM — R93.89 INCREASED ENDOMETRIAL STRIPE THICKNESS: Status: ACTIVE | Noted: 2021-09-17

## 2025-07-14 PROBLEM — G47.30 SLEEP APNEA: Status: ACTIVE | Noted: 2019-08-16

## 2025-07-14 PROBLEM — K21.9 GASTRO-ESOPHAGEAL REFLUX DISEASE WITHOUT ESOPHAGITIS: Status: ACTIVE | Noted: 2025-07-14

## 2025-07-14 PROBLEM — F29 PSYCHOSIS (H): Status: ACTIVE | Noted: 2025-07-14

## 2025-07-14 PROBLEM — K59.00 CONSTIPATION: Status: ACTIVE | Noted: 2025-07-14

## 2025-07-14 PROBLEM — D25.9 UTERINE LEIOMYOMA: Status: ACTIVE | Noted: 2021-10-12

## 2025-07-14 PROBLEM — K27.9 PUD (PEPTIC ULCER DISEASE): Status: ACTIVE | Noted: 2021-07-07

## 2025-07-14 PROBLEM — Z91.81 AT RISK FOR FALLS: Status: ACTIVE | Noted: 2025-07-14

## 2025-07-14 PROBLEM — K21.00 GASTROESOPHAGEAL REFLUX DISEASE WITH ESOPHAGITIS: Status: ACTIVE | Noted: 2025-07-14

## 2025-07-14 PROBLEM — K59.04 CHRONIC IDIOPATHIC CONSTIPATION: Status: ACTIVE | Noted: 2025-07-14

## 2025-07-14 PROBLEM — R11.0 NAUSEA: Status: ACTIVE | Noted: 2025-07-14

## 2025-07-14 PROBLEM — R13.13 PHARYNGEAL DYSPHAGIA: Status: ACTIVE | Noted: 2025-07-14

## 2025-07-14 RX ORDER — LISINOPRIL 20 MG/1
20 TABLET ORAL DAILY
Qty: 90 TABLET | Refills: 3 | Status: SHIPPED | OUTPATIENT
Start: 2025-07-14

## 2025-07-14 NOTE — TELEPHONE ENCOUNTER
Lisinopril (ZESTRIL) 20 MG tablet     Last Written Prescription Date:  7/11/24  Last Fill Quantity: 90 tabs,  # refills: 3   Last office visit: 11/21/2023 with prescribing provider:  Dr. Bentley Panda   Last virtual visit: 9/18/2024 with prescribing provider:  Dr. Bentley Panda   Future Office Visit:  7/17/25    Routing refill request to provider for review/approval because:  ACE Inhibitors (Including Combos) Protocol [id number]/14/2025 12:49 PM   Protocol Details Most recent blood pressure under 140/90 in past 12 months- Clinicial or Patient Reported    Most recent GFR on file in the past 12 months >30    Normal serum potassium on file in past 12 months     Med and pharm loaded.    Malena Tee RN  [facility]  Office: [phone]  Fax: [phone]

## 2025-07-16 NOTE — PROGRESS NOTES
VASCULAR MEDICAL FOLLOW UP ASSESSMENT  REFERRAL SOURCE: Hospitalist     REASON FOR CONSULT: First lifetime VTE presenting as a saddle PE arising form a duplicated left popliteal vein DVT within one month of a COVID infection without post COVID DVT prophylaxis or compression hosiery utilization.        Component      Latest Ref Rng 11/10/2023  11:20 AM 2024  10:31 AM 2024  1:11 PM 2024  10:19 AM 2024  11:24 AM   D-Dimer Quantitative      0.00 - 0.50 ug/mL FEU 1.21 (H)  0.34  0.78 (H)  0.71 (H)  0.76 (H)       Legend:  (H) [facility]  Echocardiography Laboratory  [address] [place] [address]     Name: KERI COLEMAN  MRN: [id number]  : 1945  Study Date: 2025 01:16 PM  Age: 79 yrs  Gender: Female  Patient Location: [facility]  Reason For Study: Aneurysm of ascending aorta without rupture  Ordering Physician: RACHEL CUEVAS  Referring Physician: RACHEL CUEVAS  Performed By: Hira Perez RDCS     BSA: 1.9 m2  Height: 64 in  Weight: 185 lb  HR: 77  BP: 145/67 mmHg  ______________________________________________________________________________  Procedure  Echocardiogram with two-dimensional, color and spectral Doppler.  ______________________________________________________________________________  Interpretation Summary     Left ventricular systolic function is normal.The visual ejection fraction is  55-60%.  The right ventricular systolic function is normal.  Mild aortic valve sclerosis  Ascending aorta dilatation is present- 4 cm.  IVC diameter <2.1 cm collapsing >50% with sniff suggests a normal RA pressure  of 3 mmHg.     Echocardiogram dated 2024 ascending aorta 3.8 cm  ______________________________________________________________________________  Left Ventricle  The left ventricle is normal in size. There is concentric remodeling present.  Left ventricular systolic function is normal. The visual ejection fraction  is  55-60%. Diastolic Doppler findings (E/E' ratio and/or other parameters)  suggest left ventricular filling pressures are normal. No regional wall motion  abnormalities noted.     Right Ventricle  The right ventricle is normal size. The right ventricular systolic function is  normal.     Atria  Normal left atrial size. Right atrial size is normal. There is no color  Doppler evidence of an atrial shunt.     Mitral Valve  There is trace mitral regurgitation. There is no mitral valve stenosis.     Tricuspid Valve  There is trace tricuspid regurgitation. The right ventricular systolic  pressure is approximated at 14.3 mmHg plus the right atrial pressure.     Aortic Valve  The aortic valve is trileaflet. Mild aortic valve sclerosis. No aortic  regurgitation is present. No aortic stenosis is present.     Pulmonic Valve  There is trace pulmonic valvular regurgitation. There is no pulmonic valvular  stenosis.     Vessels  The aortic root is normal size. Ascending aorta dilatation is present. 4 cm.  IVC diameter <2.1 cm collapsing >50% with sniff suggests a normal RA pressure  of 3 mmHg.     Pericardium  There is no pericardial effusion.     Rhythm  Sinus rhythm was noted.  ______________________________________________________________________________  MMode/2D Measurements & Calculations  IVSd: 1.1 cm     LVIDd: 4.5 cm  LVIDs: 3.1 cm  LVPWd: 1.0 cm  FS: 30.3 %  LV mass(C)d: 166.5 grams  LV mass(C)dI: 88.0 grams/m2  Ao root diam: 3.6 cm  asc Aorta Diam: 4.0 cm  LVOT diam: 2.2 cm  LVOT area: 3.6 cm2  Ao root diam index Ht(cm/m): 2.2  Ao root diam index BSA (cm/m2): 1.9  Asc Ao diam index BSA (cm/m2): 2.1  Asc Ao diam index Ht(cm/m): 2.5  LA Volume (BP): 37.9 ml     LA Volume Index (BP): 20.1 ml/m2  RV Base: 3.7 cm  RWT: 0.46  TAPSE: 2.0 cm     Time Measurements  Aortic HR: 71.0 BPM     Doppler Measurements & Calculations  MV E max leonardo: 51.9 cm/sec  MV A max leonardo: 81.2 cm/sec  MV E/A: 0.64  MV max PG: 3.2 mmHg  MV mean PG:  1.6 mmHg  MV V2 VTI: 20.4 cm  MVA(VTI): 3.7 cm2  MV dec time: 0.23 sec  LV V1 max PG: 3.7 mmHg  LV V1 max: 95.9 cm/sec  LV V1 VTI: 20.6 cm  CO(LVOT): 5.3 l/min  CI(LVOT): 2.8 l/min/m2  SV(LVOT): 74.9 ml  SI(LVOT): 39.6 ml/m2  PA acc time: 0.09 sec     TR max norm: 189.3 cm/sec  TR max P.3 mmHg  E/E' av.0  Lateral E/e': 5.7  Medial E/e': 8.2  RV S Norm: 16.9 cm/sec     ______________________________________________________________________________  Report approved by: Brent Montalvo MD on 2025 02:35 PM           A/P:           (I71.21) Aneurysm of ascending aorta without rupture (H24)  (primary encounter diagnosis)     Comment: Asx. Surgical treatment not warranted. SBP > 120 at home, no AAA on  CT abd/pelvis. Peripheral pulses not bounding. When on lisinopril 40 mg daily in the past she went to the ED with electrolyte abnormalities and went to the ED.      Plan: Check TTE in 2026, RTC one week later.  Goal BP < 120/70. We will not make BP med adjustments at present. We will check TTE n one year. If aorta is gowing we may reconsider treating her BP mor aggressively.      (I26.02)First lifetime VTE presenting as a saddle PE arising from a duplicated left popliteal vein DVT within one month of a COVID infection without post COVID DVT prophylaxis or compression hosiery utilization and concomitant prolonged inactivity     Comment: This was her first lifetime VTE event. She has a maternal h/o DVT, w/o other FHx DVT/PE. No recurrent miscarriages. Provoked due to antecedent COVID infection, COVID infection associated inactivity, and a congenitally duplicated left popliteal vein. No HC w/u warranted other than cancer screening as below. She had been very inactive and would have benefited from PT. However her and her  left  for [place] for six months shortly after having been last seen on 2023. She was instructed to get mammogram while in [place]. She had polyps previously so Cologuard  screening while on AC is not an option. As she has no fe def anemia presently, the need for colonoscopy is less urgent. TTE of 6/17/2024 reveals no pulmonary htn and resolution of right heart strain. Incidentally discovered was an ATAA measuring 38 mm. US Lower Extremity Venous Duplex Bilateral of 6/7/24 reveals resolution of DVT.  D dimer of 5/21/2024 was normal. We therefore discontinued Xarelto on 6/19/24. Her 07/08/24 d dimer was mildly elevated at 0.78 so we had her remain off of rivaroxaban at that time. Repeat d dimer of 08/13/2024 was 0.71. She has no new sxs of leg swelling or SOB/BOB. Her d dimer of 09/16/2024 was 0.76. We stopped Xarelto thereafter.        Plan: Remain off of rivaroxaban. Stop checking  d dimer. Wear knee high compression.Get colonoscopy now that she is  off of AC.              (I82.432) Acute deep vein thrombosis (DVT) of popliteal vein of left lower extremity (H)     Comment: As above     Plan: Compression Sleeve/Stocking Order for DME -         ONLY FOR DME             34 minutes total medical care on today's date.     The longitudinal care of plan for [place] was addressed during this visit. Due to added complexity of care, we will continue to support Suma Iverson  and the subsequent management of these conditions and with ongoing continuity of care for these conditions.           HPI:      Suma Iverson is a 78 year old female who developed cough and shortness of breath.  She was seen in an emergency room in October 14 where a CT of the chest was negative but she tested positive for COVID. She was given Paxlovid and sent home.  On October 20 she went back to the emergency room with diarrhea was evaluated and sent home.  She came back to the emergency room on October 28 with abdominal pain.  CT showed already diagnosed diverticulosis and fibroids.  Other evaluation was negative.  She was sent home once again.  She then returned yet again to the emergency room complaining of  a cough and shortness of breath.  Her vital signs were stable heart rate was a little on the elevated side in the 90s oxygen saturation was 99%.  She appeared tachypneic.  EKG showed sinus rhythm and an incomplete right bundle branch block T wave inversions new in V2 and V3. CT PE showed large saddle embolism with extension into the lobar and segmental branches of all lobes. Severe right heart strain was seen. LLE US showed short segment occlusive deep vein thrombus in duplicated left popliteal vein. No DVT in RLE. IR pulmonary artery suction thrombectomy completed 11/4. DOAC initiated 11/6 AM. She was discharged on this on 11/07/2023, and has been doing suboptimally with c/o continuing nausea limiting food intake and activity. She has essentially been in bed for the last six weeks. Her  states she is not  SOB , has no BOB, or leg swelling since her hospitalization. She was not given an Rx for compression hosiery. She has been COVID boosted on 11/10/2023.     Her mother had a DVT in her 50s which appeared to be unprovoked. She has not had a mammogram since 2018. Her last colonoscopy was 11 years ago and revealed several polyps but no cancer. She does not have an fe deficiency anemia.         Review Of Systems  Skin: negative  Eyes: negative  Ears/Nose/Throat: negative  Respiratory: No shortness of breath, dyspnea on exertion, cough, or hemoptysis  Cardiovascular: negative  Gastrointestinal: negative  Genitourinary: negative  Musculoskeletal: negative  Neurologic: negative  Psychiatric: negative  Hematologic/Lymphatic/Immunologic: negative  Endocrine: negative    PAST MEDICAL HISTORY:                  Past Medical History:   Diagnosis Date    Generalized anxiety disorder     HTN (hypertension)     Hyperlipidemia LDL goal <100     Obesity     BRANDON (obstructive sleep apnea)     Peptic ulcer disease     Seizure disorder (H)     Type 2 diabetes mellitus (H)     Uterine leiomyoma, unspecified location        PAST  SURGICAL HISTORY:                  Past Surgical History:   Procedure Laterality Date    AS REMV CATARACT INTRACAP,INSERT LENS      IR PULMONARY ANGIOGRAM BILATERAL  11/4/2023    UTERINE FIBROID SURGERY         CURRENT MEDICATIONS:                  Current Outpatient Medications   Medication Sig Dispense Refill    albuterol (PROAIR HFA/PROVENTIL HFA/VENTOLIN HFA) 108 (90 Base) MCG/ACT inhaler Inhale 2 puffs into the lungs every 6 hours as needed for shortness of breath, wheezing or cough 18 g 0    diazepam (VALIUM) 5 MG tablet Take 5 mg by mouth 2 times daily as needed for anxiety      famotidine (PEPCID) 40 MG tablet Take 40 mg by mouth at bedtime      lacosamide (VIMPAT) 200 MG TABS tablet Take 200 mg by mouth 2 times daily      lisinopril (ZESTRIL) 20 MG tablet TAKE 1 TABLET DAILY 90 tablet 3    ondansetron (ZOFRAN ODT) 4 MG ODT tab Take 1 tablet (4 mg) by mouth every 6 hours as needed for nausea or vomiting 30 tablet 0    pantoprazole (PROTONIX) 20 MG EC tablet Take 20 mg by mouth daily      simvastatin (ZOCOR) 40 MG tablet Take 40 mg by mouth at bedtime      vitamin D2 (ERGOCALCIFEROL) 38154 units (1250 mcg) capsule Take 50,000 Units by mouth once a week      zonisamide (ZONEGRAN) 100 MG capsule Take by mouth 2 times daily 2 capsules in the morning and 1 capsule in the evening         ALLERGIES:                  Allergies   Allergen Reactions    Levetiracetam Anxiety and Other (See Comments)     Other Reaction(s): Anxiety, Irritable    Other Reaction(s): Anxiety, Feeling irritable       SOCIAL HISTORY:                  Social History     Socioeconomic History    Marital status:      Spouse name: Not on file    Number of children: Not on file    Years of education: Not on file    Highest education level: Not on file   Occupational History    Not on file   Tobacco Use    Smoking status: Former     Types: Cigarettes    Smokeless tobacco: Never   Vaping Use    Vaping status: Never Used   Substance and Sexual  Activity    Alcohol use: Never    Drug use: Never    Sexual activity: Not on file   Other Topics Concern    Not on file   Social History Narrative    Not on file     Social Drivers of Health     Financial Resource Strain: Low Risk  (11/10/2023)    Financial Resource Strain     Within the past 12 months, have you or your family members you live with been unable to get utilities (heat, electricity) when it was really needed?: No   Food Insecurity: Low Risk  (11/10/2023)    Food Insecurity     Within the past 12 months, did you worry that your food would run out before you got money to buy more?: No     Within the past 12 months, did the food you bought just not last and you didn t have money to get more?: No   Transportation Needs: Low Risk  (11/10/2023)    Transportation Needs     Within the past 12 months, has lack of transportation kept you from medical appointments, getting your medicines, non-medical meetings or appointments, work, or from getting things that you need?: No   Physical Activity: Not on file   Stress: Not on file   Social Connections: Unknown (9/29/2023)    Received from [facility]    Social Connections     Frequency of Communication with Friends and Family: Not on file   Interpersonal Safety: Not on file   Housing Stability: Low Risk  (11/10/2023)    Housing Stability     Do you have housing? : Yes     Are you worried about losing your housing?: No       FAMILY HISTORY:                 No family history on file.          Physical exam Reveals:    O/P: WNL  HEENT: WNL  NECK: No JVD, thyromegaly, or lymphadenopathy  HEART: RRR, no murmurs, gallops, or rubs  LUNGS: CTA bilaterally without rales, wheezes, or rhonchi  GI: NABS, nondistended, nontender, soft  EXT:without cyanosis, clubbing, or edema  NEURO: nonfocal  : no flank tenderness           All relevant labs and imaging reviewed by myself on today's date.

## 2025-07-17 ENCOUNTER — OFFICE VISIT (OUTPATIENT)
Dept: OTHER | Facility: CLINIC | Age: 80
End: 2025-07-17
Attending: INTERNAL MEDICINE
Payer: MEDICARE

## 2025-07-17 VITALS
WEIGHT: 201 LBS | HEART RATE: 76 BPM | SYSTOLIC BLOOD PRESSURE: 136 MMHG | DIASTOLIC BLOOD PRESSURE: 80 MMHG | BODY MASS INDEX: 34.5 KG/M2 | OXYGEN SATURATION: 97 %

## 2025-07-17 DIAGNOSIS — I26.09 OTHER ACUTE PULMONARY EMBOLISM WITH ACUTE COR PULMONALE (H): ICD-10-CM

## 2025-07-17 DIAGNOSIS — I71.21 ANEURYSM OF ASCENDING AORTA WITHOUT RUPTURE: Primary | ICD-10-CM

## 2025-07-17 DIAGNOSIS — I82.432 ACUTE DEEP VEIN THROMBOSIS (DVT) OF POPLITEAL VEIN OF LEFT LOWER EXTREMITY (H): ICD-10-CM

## 2025-07-17 PROBLEM — K21.9 GASTROESOPHAGEAL REFLUX DISEASE WITHOUT ESOPHAGITIS: Status: ACTIVE | Noted: 2023-06-02

## 2025-07-17 PROBLEM — K63.5 POLYP OF COLON: Status: ACTIVE | Noted: 2024-10-10

## 2025-07-17 PROBLEM — K26.9 DUODENAL ULCER: Status: ACTIVE | Noted: 2023-06-02

## 2025-07-17 PROBLEM — R19.8 IRREGULAR BOWEL HABITS: Status: ACTIVE | Noted: 2025-07-17

## 2025-07-17 PROBLEM — D12.0 BENIGN NEOPLASM OF CECUM: Status: ACTIVE | Noted: 2024-10-14

## 2025-07-17 PROBLEM — R10.12 LEFT UPPER QUADRANT PAIN: Status: ACTIVE | Noted: 2023-09-26

## 2025-07-17 PROBLEM — Z87.11 HX OF GASTRIC ULCER: Status: ACTIVE | Noted: 2025-07-17

## 2025-07-17 PROBLEM — K64.9 HEMORRHOIDS: Status: ACTIVE | Noted: 2024-10-10

## 2025-07-17 PROBLEM — Z86.16 HISTORY OF COVID-19: Status: ACTIVE | Noted: 2025-07-17

## 2025-07-17 PROBLEM — K58.2 MIXED IRRITABLE BOWEL SYNDROME: Status: ACTIVE | Noted: 2025-07-17

## 2025-07-17 PROBLEM — R19.8 DIGESTIVE SYMPTOM: Status: ACTIVE | Noted: 2023-09-26

## 2025-07-17 PROBLEM — R10.9 ABDOMINAL DISCOMFORT: Status: ACTIVE | Noted: 2025-07-17

## 2025-07-17 PROBLEM — R10.11 RIGHT UPPER QUADRANT PAIN: Status: ACTIVE | Noted: 2023-09-26

## 2025-07-17 PROBLEM — R10.84 GENERALIZED ABDOMINAL PAIN: Status: ACTIVE | Noted: 2025-07-17

## 2025-07-17 PROBLEM — I26.99 PULMONARY THROMBOEMBOLISM (H): Status: ACTIVE | Noted: 2023-11-03

## 2025-07-17 PROBLEM — K57.30 DIVERTICULAR DISEASE OF LARGE INTESTINE: Status: ACTIVE | Noted: 2024-10-10

## 2025-07-17 PROCEDURE — G0463 HOSPITAL OUTPT CLINIC VISIT: HCPCS | Performed by: INTERNAL MEDICINE

## 2025-07-17 NOTE — PROGRESS NOTES
[facility]        Patient is here for a follow up.    Pt is currently taking Statin.    /80 (BP Location: Right arm, Patient Position: Sitting, Cuff Size: Adult Large)   Pulse 76   Wt 201 lb (91.2 kg)   SpO2 97%   BMI 34.50 kg/m      The provider has been notified that the patient has no concerns.     Questions patient would like addressed today are: N/A.    Refills are needed: No    Has homecare services and agency name:  No     Patient has been identified as a fall risk.    Interventions were completed as noted below:  [x]Exam tables/chairs remained in the lowest position.   []Patient remained in wheelchair.    []Provider requested patient in exam chair.  Patient required assist and use of transfer belt.  [x]Exam room door remained open unless with a provider.  []A bell provided to patient to notify staff if assistance was needed.  [x]Provider notified patient was identified as a fall risk.      Kandy Ortiz MA

## (undated) RX ORDER — FENTANYL CITRATE 50 UG/ML
INJECTION, SOLUTION INTRAMUSCULAR; INTRAVENOUS
Status: DISPENSED
Start: 2023-11-04

## (undated) RX ORDER — HEPARIN SODIUM 200 [USP'U]/100ML
INJECTION, SOLUTION INTRAVENOUS
Status: DISPENSED
Start: 2023-11-04